# Patient Record
Sex: FEMALE | Race: OTHER | Employment: OTHER
[De-identification: names, ages, dates, MRNs, and addresses within clinical notes are randomized per-mention and may not be internally consistent; named-entity substitution may affect disease eponyms.]

---

## 2017-02-07 ENCOUNTER — OFFICE VISIT (OUTPATIENT)
Dept: FAMILY MEDICINE CLINIC | Facility: CLINIC | Age: 77
End: 2017-02-07

## 2017-02-07 VITALS
BODY MASS INDEX: 24.54 KG/M2 | HEART RATE: 82 BPM | TEMPERATURE: 97.3 F | HEIGHT: 60 IN | DIASTOLIC BLOOD PRESSURE: 81 MMHG | SYSTOLIC BLOOD PRESSURE: 137 MMHG | RESPIRATION RATE: 16 BRPM | OXYGEN SATURATION: 98 % | WEIGHT: 125 LBS

## 2017-02-07 DIAGNOSIS — E03.9 ACQUIRED HYPOTHYROIDISM: ICD-10-CM

## 2017-02-07 DIAGNOSIS — R55 SYNCOPE AND COLLAPSE: ICD-10-CM

## 2017-02-07 DIAGNOSIS — K55.9 COLITIS, ISCHEMIC (HCC): ICD-10-CM

## 2017-02-07 DIAGNOSIS — D62 ACUTE BLOOD LOSS ANEMIA: ICD-10-CM

## 2017-02-07 DIAGNOSIS — D69.6 THROMBOCYTOPENIA (HCC): ICD-10-CM

## 2017-02-07 DIAGNOSIS — Z09 HOSPITAL DISCHARGE FOLLOW-UP: Primary | ICD-10-CM

## 2017-02-07 PROCEDURE — 99214 OFFICE O/P EST MOD 30 MIN: CPT | Performed by: FAMILY MEDICINE

## 2017-02-09 ENCOUNTER — TELEPHONE (OUTPATIENT)
Dept: FAMILY MEDICINE CLINIC | Facility: CLINIC | Age: 77
End: 2017-02-09

## 2017-02-24 DIAGNOSIS — D62 ACUTE BLOOD LOSS ANEMIA: ICD-10-CM

## 2017-02-24 DIAGNOSIS — E03.9 ACQUIRED HYPOTHYROIDISM: ICD-10-CM

## 2017-02-24 LAB
FOLATE: NORMAL
IRON: NORMAL
RETIC: NORMAL
T4 FREE: NORMAL
TOTAL IRON BINDING CAPACITY: NORMAL
TSH SERPL DL<=0.05 MIU/L-ACNC: NORMAL UIU/ML
VITAMIN B-12: NORMAL

## 2017-03-24 DIAGNOSIS — F41.9 ANXIETY: ICD-10-CM

## 2017-03-24 RX ORDER — ALPRAZOLAM 0.5 MG/1
TABLET ORAL
Qty: 30 TABLET | Refills: 0 | Status: SHIPPED | OUTPATIENT
Start: 2017-03-24 | End: 2017-04-17 | Stop reason: SDUPTHER

## 2017-03-27 DIAGNOSIS — I10 ESSENTIAL HYPERTENSION: ICD-10-CM

## 2017-03-28 RX ORDER — LISINOPRIL 20 MG/1
TABLET ORAL
Qty: 30 TABLET | Refills: 1 | Status: SHIPPED | OUTPATIENT
Start: 2017-03-28 | End: 2017-04-17

## 2017-04-17 ENCOUNTER — OFFICE VISIT (OUTPATIENT)
Dept: FAMILY MEDICINE CLINIC | Age: 77
End: 2017-04-17
Payer: MEDICARE

## 2017-04-17 ENCOUNTER — HOSPITAL ENCOUNTER (OUTPATIENT)
Age: 77
Setting detail: SPECIMEN
Discharge: HOME | End: 2017-04-17
Payer: MEDICARE

## 2017-04-17 VITALS
WEIGHT: 123 LBS | BODY MASS INDEX: 24.15 KG/M2 | HEART RATE: 73 BPM | RESPIRATION RATE: 16 BRPM | HEIGHT: 60 IN | SYSTOLIC BLOOD PRESSURE: 123 MMHG | DIASTOLIC BLOOD PRESSURE: 90 MMHG

## 2017-04-17 DIAGNOSIS — I10 ESSENTIAL HYPERTENSION: Primary | ICD-10-CM

## 2017-04-17 DIAGNOSIS — R25.1 LIMB TREMOR: ICD-10-CM

## 2017-04-17 DIAGNOSIS — F41.9 ANXIETY: ICD-10-CM

## 2017-04-17 DIAGNOSIS — K21.9 GASTROESOPHAGEAL REFLUX DISEASE WITHOUT ESOPHAGITIS: ICD-10-CM

## 2017-04-17 LAB
ANION GAP SERPL CALCULATED.3IONS-SCNC: 16 MMOL/L (ref 9–17)
CHLORIDE BLD-SCNC: 102 MMOL/L (ref 98–107)
CO2: 24 MMOL/L (ref 20–31)
POTASSIUM SERPL-SCNC: 4.3 MMOL/L (ref 3.7–5.3)
SODIUM BLD-SCNC: 142 MMOL/L (ref 135–144)

## 2017-04-17 PROCEDURE — G8510 SCR DEP NEG, NO PLAN REQD: HCPCS | Performed by: FAMILY MEDICINE

## 2017-04-17 PROCEDURE — 3288F FALL RISK ASSESSMENT DOCD: CPT | Performed by: FAMILY MEDICINE

## 2017-04-17 PROCEDURE — 99214 OFFICE O/P EST MOD 30 MIN: CPT | Performed by: FAMILY MEDICINE

## 2017-04-17 RX ORDER — OMEPRAZOLE 20 MG/1
20 CAPSULE, DELAYED RELEASE ORAL DAILY
Qty: 30 CAPSULE | Refills: 11 | Status: SHIPPED | OUTPATIENT
Start: 2017-04-17 | End: 2018-04-18 | Stop reason: SDUPTHER

## 2017-04-17 RX ORDER — ALPRAZOLAM 0.5 MG/1
TABLET ORAL
Qty: 30 TABLET | Refills: 0 | Status: SHIPPED | OUTPATIENT
Start: 2017-04-17 | End: 2017-05-30 | Stop reason: SDUPTHER

## 2017-04-17 RX ORDER — LISINOPRIL 20 MG/1
TABLET ORAL
Qty: 30 TABLET | Refills: 5 | Status: SHIPPED | OUTPATIENT
Start: 2017-04-17 | End: 2017-10-31 | Stop reason: SDUPTHER

## 2017-04-17 RX ORDER — RISPERIDONE 3 MG/1
3 TABLET, FILM COATED ORAL 2 TIMES DAILY
Qty: 60 TABLET | Refills: 11 | Status: SHIPPED | OUTPATIENT
Start: 2017-04-17 | End: 2018-07-30 | Stop reason: SDUPTHER

## 2017-04-17 ASSESSMENT — PATIENT HEALTH QUESTIONNAIRE - PHQ9
1. LITTLE INTEREST OR PLEASURE IN DOING THINGS: 0
SUM OF ALL RESPONSES TO PHQ9 QUESTIONS 1 & 2: 0
2. FEELING DOWN, DEPRESSED OR HOPELESS: 0
SUM OF ALL RESPONSES TO PHQ QUESTIONS 1-9: 0

## 2017-04-18 DIAGNOSIS — Z13.9 SCREENING: Primary | ICD-10-CM

## 2017-04-26 ENCOUNTER — TELEPHONE (OUTPATIENT)
Dept: FAMILY MEDICINE CLINIC | Age: 77
End: 2017-04-26

## 2017-05-02 ENCOUNTER — HOSPITAL ENCOUNTER (OUTPATIENT)
Dept: MRI IMAGING | Age: 77
Discharge: HOME | End: 2017-05-02
Payer: MEDICARE

## 2017-05-02 DIAGNOSIS — R25.1 LIMB TREMOR: ICD-10-CM

## 2017-05-02 LAB
BUN BLDV-MCNC: 13 MG/DL (ref 8–23)
CREAT SERPL-MCNC: 1 MG/DL (ref 0.5–0.9)
GFR AFRICAN AMERICAN: >60 ML/MIN
GFR NON-AFRICAN AMERICAN: 54 ML/MIN
GFR SERPL CREATININE-BSD FRML MDRD: ABNORMAL ML/MIN/{1.73_M2}
GFR SERPL CREATININE-BSD FRML MDRD: ABNORMAL ML/MIN/{1.73_M2}

## 2017-05-02 PROCEDURE — A9579 GAD-BASE MR CONTRAST NOS,1ML: HCPCS | Performed by: FAMILY MEDICINE

## 2017-05-02 PROCEDURE — 6360000004 HC RX CONTRAST MEDICATION: Performed by: FAMILY MEDICINE

## 2017-05-02 PROCEDURE — 82565 ASSAY OF CREATININE: CPT

## 2017-05-02 PROCEDURE — 84520 ASSAY OF UREA NITROGEN: CPT

## 2017-05-02 PROCEDURE — 36415 COLL VENOUS BLD VENIPUNCTURE: CPT

## 2017-05-02 PROCEDURE — 70553 MRI BRAIN STEM W/O & W/DYE: CPT

## 2017-05-02 RX ADMIN — GADOPENTETATE DIMEGLUMINE 12 ML: 469.01 INJECTION INTRAVENOUS at 16:46

## 2017-05-03 ENCOUNTER — TELEPHONE (OUTPATIENT)
Dept: FAMILY MEDICINE CLINIC | Age: 77
End: 2017-05-03

## 2017-05-26 ENCOUNTER — TELEPHONE (OUTPATIENT)
Dept: FAMILY MEDICINE CLINIC | Age: 77
End: 2017-05-26

## 2017-05-26 DIAGNOSIS — R25.1 LIMB TREMOR: Primary | ICD-10-CM

## 2017-05-30 DIAGNOSIS — F41.9 ANXIETY: ICD-10-CM

## 2017-05-31 RX ORDER — ALPRAZOLAM 0.5 MG/1
TABLET ORAL
Qty: 30 TABLET | Refills: 0 | Status: SHIPPED | OUTPATIENT
Start: 2017-05-31 | End: 2017-07-02 | Stop reason: SDUPTHER

## 2017-07-02 DIAGNOSIS — F41.9 ANXIETY: ICD-10-CM

## 2017-07-03 RX ORDER — ALPRAZOLAM 0.5 MG/1
TABLET ORAL
Qty: 30 TABLET | Refills: 0 | Status: SHIPPED | OUTPATIENT
Start: 2017-07-03 | End: 2017-07-31 | Stop reason: SDUPTHER

## 2017-07-24 ENCOUNTER — OFFICE VISIT (OUTPATIENT)
Dept: FAMILY MEDICINE CLINIC | Age: 77
End: 2017-07-24
Payer: MEDICARE

## 2017-07-24 VITALS
HEART RATE: 69 BPM | WEIGHT: 114 LBS | DIASTOLIC BLOOD PRESSURE: 68 MMHG | HEIGHT: 59 IN | BODY MASS INDEX: 22.98 KG/M2 | SYSTOLIC BLOOD PRESSURE: 123 MMHG

## 2017-07-24 DIAGNOSIS — R19.03 RIGHT LOWER QUADRANT ABDOMINAL MASS: Primary | ICD-10-CM

## 2017-07-24 DIAGNOSIS — N13.9 URINARY OBSTRUCTION: ICD-10-CM

## 2017-07-24 DIAGNOSIS — R94.6 ABNORMAL THYROID EXAM: ICD-10-CM

## 2017-07-24 PROCEDURE — 99214 OFFICE O/P EST MOD 30 MIN: CPT | Performed by: FAMILY MEDICINE

## 2017-07-25 DIAGNOSIS — R94.6 ABNORMAL THYROID EXAM: ICD-10-CM

## 2017-07-25 LAB — T4 FREE: NORMAL

## 2017-07-26 ENCOUNTER — HOSPITAL ENCOUNTER (OUTPATIENT)
Dept: CT IMAGING | Age: 77
Discharge: HOME | End: 2017-07-26
Payer: MEDICARE

## 2017-07-26 DIAGNOSIS — R19.03 RIGHT LOWER QUADRANT ABDOMINAL MASS: ICD-10-CM

## 2017-07-26 LAB
BUN BLDV-MCNC: 11 MG/DL (ref 8–23)
CREAT SERPL-MCNC: 0.63 MG/DL (ref 0.5–0.9)
GFR AFRICAN AMERICAN: >60 ML/MIN
GFR NON-AFRICAN AMERICAN: >60 ML/MIN
GFR SERPL CREATININE-BSD FRML MDRD: NORMAL ML/MIN/{1.73_M2}
GFR SERPL CREATININE-BSD FRML MDRD: NORMAL ML/MIN/{1.73_M2}

## 2017-07-26 PROCEDURE — 82565 ASSAY OF CREATININE: CPT

## 2017-07-26 PROCEDURE — 74177 CT ABD & PELVIS W/CONTRAST: CPT

## 2017-07-26 PROCEDURE — 84520 ASSAY OF UREA NITROGEN: CPT

## 2017-07-26 PROCEDURE — 36415 COLL VENOUS BLD VENIPUNCTURE: CPT

## 2017-07-26 PROCEDURE — 6360000004 HC RX CONTRAST MEDICATION: Performed by: FAMILY MEDICINE

## 2017-07-26 RX ADMIN — IOVERSOL: 741 INJECTION INTRA-ARTERIAL; INTRAVENOUS at 13:54

## 2017-07-31 DIAGNOSIS — F41.9 ANXIETY: ICD-10-CM

## 2017-07-31 RX ORDER — ALPRAZOLAM 0.5 MG/1
TABLET ORAL
Qty: 30 TABLET | Refills: 0 | Status: SHIPPED | OUTPATIENT
Start: 2017-07-31 | End: 2017-08-29 | Stop reason: SDUPTHER

## 2017-08-29 DIAGNOSIS — F41.9 ANXIETY: ICD-10-CM

## 2017-08-29 RX ORDER — ALPRAZOLAM 0.5 MG/1
TABLET ORAL
Qty: 30 TABLET | Refills: 0 | Status: SHIPPED | OUTPATIENT
Start: 2017-08-29 | End: 2017-12-27 | Stop reason: SDUPTHER

## 2017-10-12 ENCOUNTER — HOSPITAL ENCOUNTER (OUTPATIENT)
Age: 77
Setting detail: SPECIMEN
Discharge: HOME | End: 2017-10-12
Payer: MEDICARE

## 2017-10-12 ENCOUNTER — OFFICE VISIT (OUTPATIENT)
Dept: FAMILY MEDICINE CLINIC | Age: 77
End: 2017-10-12
Payer: MEDICARE

## 2017-10-12 VITALS
BODY MASS INDEX: 22.58 KG/M2 | HEART RATE: 73 BPM | SYSTOLIC BLOOD PRESSURE: 140 MMHG | DIASTOLIC BLOOD PRESSURE: 80 MMHG | HEIGHT: 59 IN | WEIGHT: 112 LBS | RESPIRATION RATE: 16 BRPM | OXYGEN SATURATION: 97 %

## 2017-10-12 DIAGNOSIS — R31.9 HEMATURIA: Primary | ICD-10-CM

## 2017-10-12 DIAGNOSIS — N32.81 OAB (OVERACTIVE BLADDER): ICD-10-CM

## 2017-10-12 DIAGNOSIS — F41.9 ANXIETY: ICD-10-CM

## 2017-10-12 DIAGNOSIS — N30.01 ACUTE CYSTITIS WITH HEMATURIA: ICD-10-CM

## 2017-10-12 LAB
BILIRUBIN, POC: ABNORMAL
BLOOD URINE, POC: ABNORMAL
CLARITY, POC: CLEAR
COLOR, POC: YELLOW
GLUCOSE URINE, POC: ABNORMAL
KETONES, POC: ABNORMAL
LEUKOCYTE EST, POC: ABNORMAL
NITRITE, POC: ABNORMAL
PH, POC: 6
PROTEIN, POC: ABNORMAL
SPECIFIC GRAVITY, POC: 1.02
UROBILINOGEN, POC: ABNORMAL

## 2017-10-12 PROCEDURE — 99214 OFFICE O/P EST MOD 30 MIN: CPT | Performed by: FAMILY MEDICINE

## 2017-10-12 PROCEDURE — 81002 URINALYSIS NONAUTO W/O SCOPE: CPT | Performed by: FAMILY MEDICINE

## 2017-10-12 RX ORDER — ALPRAZOLAM 1 MG/1
1 TABLET ORAL NIGHTLY PRN
Qty: 30 TABLET | Refills: 2 | Status: SHIPPED | OUTPATIENT
Start: 2017-10-12 | End: 2017-12-28 | Stop reason: SDUPTHER

## 2017-10-12 RX ORDER — LEVOFLOXACIN 500 MG/1
500 TABLET, FILM COATED ORAL DAILY
Qty: 10 TABLET | Refills: 0 | Status: SHIPPED | OUTPATIENT
Start: 2017-10-12 | End: 2017-10-22

## 2017-10-12 RX ORDER — OXYBUTYNIN CHLORIDE 10 MG/1
10 TABLET, EXTENDED RELEASE ORAL DAILY
Qty: 30 TABLET | Refills: 11 | Status: SHIPPED | OUTPATIENT
Start: 2017-10-12 | End: 2018-07-30 | Stop reason: SINTOL

## 2017-10-12 NOTE — PROGRESS NOTES
HYPERTENSION visit     BP Readings from Last 3 Encounters:   07/24/17 123/68   04/17/17 (!) 123/90   02/07/17 137/81       LDL Calculated (mg/dL)   Date Value   11/01/2013 50     HDL (mg/dL)   Date Value   11/01/2013 74 (A)     BUN (mg/dL)   Date Value   07/26/2017 11     CREATININE (mg/dL)   Date Value   07/26/2017 0.63              Have you changed or started any medications since your last visit including any over-the-counter medicines, vitamins, or herbal medicines? no   Have you stopped taking any of your medications? Is so, why? -  no  Are you having any side effects from any of your medications? - no    Have you seen any other physician or provider since your last visit?  no   Have you had any other diagnostic tests since your last visit?  no   Have you been seen in the emergency room and/or had an admission in a hospital since we last saw you?  no   Have you had your routine dental cleaning in the past 6 months?  no     Do you have an active MyChart account? If no, what is the barrier?   No:     Patient Care Team:  Anders Wang DO as PCP - General (Family Medicine)    Medical History Review  Past Medical, Family, and Social History reviewed and does contribute to the patient presenting condition    Health Maintenance   Topic Date Due    DTaP/Tdap/Td vaccine (1 - Tdap) 06/14/1959    Flu vaccine (1) 09/01/2017    Lipid screen  11/01/2018    Zostavax vaccine  Completed    DEXA (modify frequency per FRAX score)  Completed    Pneumococcal low/med risk  Completed

## 2017-10-12 NOTE — PROGRESS NOTES
[facility] PHYSICIANS  COMPREHENSIVE CARE  [address]  [address] [facility] [address]  Dept: [phone]      Kay Watt is a 69 y.o. female who presents today for follow up on her  medical conditions as noted below. Chief Complaint   Patient presents with    Hypertension     6 month check       Patient Active Problem List:     Anxiety     Insomnia     GERD (gastroesophageal reflux disease)     HTN (hypertension)     Acute blood loss anemia     Colitis, ischemic (HCC)     Syncope and collapse     Decreased platelet count (HCC)     Past Medical History:   Diagnosis Date    Anxiety     GERD (gastroesophageal reflux disease)     HTN (hypertension) 12/4/2014    Hyperthyroidism     have been running really high    Insomnia       Past Surgical History:   Procedure Laterality Date    APPENDECTOMY      HYSTERECTOMY       Family History   Problem Relation Age of Onset    Heart Disease Father        Current Outpatient Prescriptions   Medication Sig Dispense Refill    ALPRAZolam (XANAX) 1 MG tablet Take 1 tablet by mouth nightly as needed for Sleep 30 tablet 2    levofloxacin (LEVAQUIN) 500 MG tablet Take 1 tablet by mouth daily for 10 days 10 tablet 0    oxybutynin (DITROPAN-XL) 10 MG extended release tablet Take 1 tablet by mouth daily 30 tablet 11    ALPRAZolam (XANAX) 0.5 MG tablet TAKE ONE TABLET BY MOUTH EVERY NIGHT AT BEDTIME AS NEEDED FOR SLEEP 30 tablet 0    carbidopa-levodopa (SINEMET)  MG per tablet       lisinopril (PRINIVIL;ZESTRIL) 20 MG tablet TAKE ONE TABLET BY MOUTH DAILY 30 tablet 5    omeprazole (PRILOSEC) 20 MG delayed release capsule Take 1 capsule by mouth Daily 30 capsule 11    risperiDONE (RISPERDAL) 3 MG tablet Take 1 tablet by mouth 2 times daily (Patient taking differently: Take 1 mg by mouth 2 times daily ) 60 tablet 11     No current facility-administered medications for this visit.       ALLERGIES:  No Known Allergies    Social History   Substance Use Topics m)   Wt 112 lb (50.8 kg)   SpO2 97%   Breastfeeding? No   BMI 22.61 kg/m²   Constitutional: She is oriented to person, place, and time. She   appears well-developed and well-nourished. HENT:   Head: Normocephalic and atraumatic. Right Ear: External ear normal. Tympanic membrane is not erythematous. No middle ear effusion. Left Ear: External ear normal. Tympanic membrane is not erythematous. No middle ear effusion. Nose: No mucosal edema. Mouth/Throat: Oropharynx is clear and moist. No posterior oropharyngeal erythema. Eyes: Conjunctivae and EOM are normal. Pupils are equal, round, and reactive to light. Neck: Normal range of motion. Neck supple. No thyromegaly present. Cardiovascular: Normal rate, regular rhythm and normal heart sounds. No murmur heard. Pulmonary/Chest: Effort normal and breath sounds normal. She has no wheezes. Shehas no rales. Abdominal: Soft. Bowel sounds are normal. She exhibits no distension and no mass. There is no tenderness. There is no rebound and no guarding. Genitourinary/Anorectal:deferred  Musculoskeletal: Normal range of motion. She exhibits no edema or tenderness. Lymphadenopathy: She has no cervical adenopathy. Neurological: She is alert and oriented to person, place, and time. She has normal reflexes. Skin: Skin is warm and dry. No rash noted. Psychiatric: She has a normal mood and affect. Her   behavior is normal.       Assessment:      1. Hematuria    2. Anxiety    3. Acute cystitis with hematuria    4. OAB (overactive bladder)          Plan:      Call or return to clinic prn if these symptoms worsen or fail to improve as anticipated. I have reviewed the instructions with the patient, answering all questions to her satisfaction. No Follow-up on file. Orders Placed This Encounter   Procedures    Urine Culture     Order Specific Question:   Specify (ex-cath, midstream, cysto, etc)?      Answer:   midstream    POCT Urinalysis no Micro Orders Placed This Encounter   Medications    ALPRAZolam (XANAX) 1 MG tablet     Sig: Take 1 tablet by mouth nightly as needed for Sleep     Dispense:  30 tablet     Refill:  2    levofloxacin (LEVAQUIN) 500 MG tablet     Sig: Take 1 tablet by mouth daily for 10 days     Dispense:  10 tablet     Refill:  0    oxybutynin (DITROPAN-XL) 10 MG extended release tablet     Sig: Take 1 tablet by mouth daily     Dispense:  30 tablet     Refill:  11     She is to try the above medication if this does not help her she is to notify and otherwise return to the office in 6 months  Electronically signed by Kaia Will DO on 10/12/2017 at 2:08 PM

## 2017-10-13 LAB
CULTURE: NORMAL
CULTURE: NORMAL
Lab: NORMAL
SPECIMEN DESCRIPTION: NORMAL
STATUS: NORMAL

## 2017-10-18 DIAGNOSIS — R31.9 HEMATURIA, UNSPECIFIED TYPE: Primary | ICD-10-CM

## 2017-12-27 DIAGNOSIS — F41.9 ANXIETY: ICD-10-CM

## 2017-12-27 RX ORDER — ALPRAZOLAM 0.5 MG/1
TABLET ORAL
Qty: 30 TABLET | Refills: 0 | Status: SHIPPED | OUTPATIENT
Start: 2017-12-27 | End: 2018-07-30

## 2017-12-28 DIAGNOSIS — F41.9 ANXIETY: ICD-10-CM

## 2017-12-30 RX ORDER — ALPRAZOLAM 1 MG/1
TABLET ORAL
Qty: 30 TABLET | Refills: 0 | Status: SHIPPED | OUTPATIENT
Start: 2017-12-30 | End: 2018-01-30 | Stop reason: SDUPTHER

## 2018-01-30 DIAGNOSIS — F41.9 ANXIETY: ICD-10-CM

## 2018-01-30 RX ORDER — ALPRAZOLAM 1 MG/1
TABLET ORAL
Qty: 30 TABLET | Refills: 0 | Status: SHIPPED | OUTPATIENT
Start: 2018-01-30 | End: 2018-03-01

## 2018-04-18 DIAGNOSIS — K21.9 GASTROESOPHAGEAL REFLUX DISEASE WITHOUT ESOPHAGITIS: ICD-10-CM

## 2018-04-18 RX ORDER — OMEPRAZOLE 20 MG/1
CAPSULE, DELAYED RELEASE ORAL
Qty: 90 CAPSULE | Refills: 10 | Status: SHIPPED | OUTPATIENT
Start: 2018-04-18 | End: 2019-07-14 | Stop reason: SDUPTHER

## 2018-04-27 DIAGNOSIS — I10 ESSENTIAL HYPERTENSION: ICD-10-CM

## 2018-04-30 RX ORDER — LISINOPRIL 20 MG/1
TABLET ORAL
Qty: 30 TABLET | Refills: 4 | Status: SHIPPED | OUTPATIENT
Start: 2018-04-30 | End: 2018-09-25 | Stop reason: SDUPTHER

## 2018-05-29 ENCOUNTER — HOSPITAL ENCOUNTER (OUTPATIENT)
Age: 78
Setting detail: SPECIMEN
Discharge: HOME | End: 2018-05-29
Payer: MEDICARE

## 2018-05-29 ENCOUNTER — OFFICE VISIT (OUTPATIENT)
Dept: FAMILY MEDICINE CLINIC | Age: 78
End: 2018-05-29
Payer: MEDICARE

## 2018-05-29 VITALS
HEIGHT: 59 IN | HEART RATE: 77 BPM | DIASTOLIC BLOOD PRESSURE: 80 MMHG | WEIGHT: 110 LBS | SYSTOLIC BLOOD PRESSURE: 138 MMHG | BODY MASS INDEX: 22.18 KG/M2 | RESPIRATION RATE: 16 BRPM

## 2018-05-29 DIAGNOSIS — R63.0 DECREASED APPETITE: ICD-10-CM

## 2018-05-29 DIAGNOSIS — E55.9 VITAMIN D DEFICIENCY: ICD-10-CM

## 2018-05-29 DIAGNOSIS — R34 DECREASED URINE OUTPUT: ICD-10-CM

## 2018-05-29 DIAGNOSIS — R43.8 BAD TASTE IN MOUTH: ICD-10-CM

## 2018-05-29 DIAGNOSIS — D69.6 DECREASED PLATELET COUNT (HCC): ICD-10-CM

## 2018-05-29 DIAGNOSIS — D50.8 IRON DEFICIENCY ANEMIA SECONDARY TO INADEQUATE DIETARY IRON INTAKE: ICD-10-CM

## 2018-05-29 DIAGNOSIS — R73.09 ELEVATED GLUCOSE: ICD-10-CM

## 2018-05-29 DIAGNOSIS — S33.5XXA LUMBAR SPRAIN, INITIAL ENCOUNTER: Primary | ICD-10-CM

## 2018-05-29 DIAGNOSIS — K55.9 COLITIS, ISCHEMIC (HCC): ICD-10-CM

## 2018-05-29 LAB
ABSOLUTE EOS #: 0.12 K/UL (ref 0–0.44)
ABSOLUTE IMMATURE GRANULOCYTE: <0.03 K/UL (ref 0–0.3)
ABSOLUTE LYMPH #: 0.96 K/UL (ref 1.1–3.7)
ABSOLUTE MONO #: 0.33 K/UL (ref 0.1–1.2)
ALBUMIN SERPL-MCNC: 3.8 G/DL (ref 3.5–5.2)
ALBUMIN/GLOBULIN RATIO: 1.2 (ref 1–2.5)
ALP BLD-CCNC: 99 U/L (ref 35–104)
ALT SERPL-CCNC: 21 U/L (ref 5–33)
ANION GAP SERPL CALCULATED.3IONS-SCNC: 14 MMOL/L (ref 9–17)
AST SERPL-CCNC: 28 U/L
BASOPHILS # BLD: 0 % (ref 0–2)
BASOPHILS ABSOLUTE: <0.03 K/UL (ref 0–0.2)
BILIRUB SERPL-MCNC: 0.48 MG/DL (ref 0.3–1.2)
BILIRUBIN, POC: NORMAL
BLOOD URINE, POC: NEGATIVE
BUN BLDV-MCNC: 7 MG/DL (ref 8–23)
BUN/CREAT BLD: ABNORMAL (ref 9–20)
CALCIUM SERPL-MCNC: 8.6 MG/DL (ref 8.6–10.4)
CHLORIDE BLD-SCNC: 101 MMOL/L (ref 98–107)
CLARITY, POC: CLEAR
CO2: 25 MMOL/L (ref 20–31)
COLOR, POC: YELLOW
CREAT SERPL-MCNC: 0.51 MG/DL (ref 0.5–0.9)
DIFFERENTIAL TYPE: ABNORMAL
EOSINOPHILS RELATIVE PERCENT: 3 % (ref 1–4)
GFR AFRICAN AMERICAN: >60 ML/MIN
GFR NON-AFRICAN AMERICAN: >60 ML/MIN
GFR SERPL CREATININE-BSD FRML MDRD: ABNORMAL ML/MIN/{1.73_M2}
GFR SERPL CREATININE-BSD FRML MDRD: ABNORMAL ML/MIN/{1.73_M2}
GLUCOSE BLD-MCNC: 80 MG/DL (ref 70–99)
GLUCOSE URINE, POC: NORMAL
HBA1C MFR BLD: 5.3 %
HCT VFR BLD CALC: 34.8 % (ref 36.3–47.1)
HEMOGLOBIN: 11.2 G/DL (ref 11.9–15.1)
IMMATURE GRANULOCYTES: 0 %
KETONES, POC: NORMAL
LEUKOCYTE EST, POC: NORMAL
LYMPHOCYTES # BLD: 26 % (ref 24–43)
MCH RBC QN AUTO: 32.8 PG (ref 25.2–33.5)
MCHC RBC AUTO-ENTMCNC: 32.2 G/DL (ref 28.4–34.8)
MCV RBC AUTO: 102.1 FL (ref 82.6–102.9)
MONOCYTES # BLD: 9 % (ref 3–12)
NITRITE, POC: NORMAL
NRBC AUTOMATED: 0 PER 100 WBC
PDW BLD-RTO: 12.2 % (ref 11.8–14.4)
PH, POC: 6
PLATELET # BLD: 163 K/UL (ref 138–453)
PLATELET ESTIMATE: ABNORMAL
PMV BLD AUTO: 9.8 FL (ref 8.1–13.5)
POTASSIUM SERPL-SCNC: 3.7 MMOL/L (ref 3.7–5.3)
PROTEIN, POC: NORMAL
RBC # BLD: 3.41 M/UL (ref 3.95–5.11)
RBC # BLD: ABNORMAL 10*6/UL
SEG NEUTROPHILS: 62 % (ref 36–65)
SEGMENTED NEUTROPHILS ABSOLUTE COUNT: 2.34 K/UL (ref 1.5–8.1)
SODIUM BLD-SCNC: 140 MMOL/L (ref 135–144)
SPECIFIC GRAVITY, POC: 1.02
TOTAL PROTEIN: 7.1 G/DL (ref 6.4–8.3)
UROBILINOGEN, POC: NORMAL
VITAMIN D 25-HYDROXY: 151.8 NG/ML (ref 30–100)
WBC # BLD: 3.8 K/UL (ref 3.5–11.3)
WBC # BLD: ABNORMAL 10*3/UL

## 2018-05-29 PROCEDURE — 99215 OFFICE O/P EST HI 40 MIN: CPT | Performed by: FAMILY MEDICINE

## 2018-05-29 PROCEDURE — 81002 URINALYSIS NONAUTO W/O SCOPE: CPT | Performed by: FAMILY MEDICINE

## 2018-05-29 PROCEDURE — 1036F TOBACCO NON-USER: CPT | Performed by: FAMILY MEDICINE

## 2018-05-29 PROCEDURE — 1123F ACP DISCUSS/DSCN MKR DOCD: CPT | Performed by: FAMILY MEDICINE

## 2018-05-29 PROCEDURE — G8427 DOCREV CUR MEDS BY ELIG CLIN: HCPCS | Performed by: FAMILY MEDICINE

## 2018-05-29 PROCEDURE — G8420 CALC BMI NORM PARAMETERS: HCPCS | Performed by: FAMILY MEDICINE

## 2018-05-29 PROCEDURE — 4040F PNEUMOC VAC/ADMIN/RCVD: CPT | Performed by: FAMILY MEDICINE

## 2018-05-29 PROCEDURE — 1090F PRES/ABSN URINE INCON ASSESS: CPT | Performed by: FAMILY MEDICINE

## 2018-05-29 PROCEDURE — G8399 PT W/DXA RESULTS DOCUMENT: HCPCS | Performed by: FAMILY MEDICINE

## 2018-05-29 PROCEDURE — 83036 HEMOGLOBIN GLYCOSYLATED A1C: CPT | Performed by: FAMILY MEDICINE

## 2018-05-29 RX ORDER — CLONAZEPAM 0.5 MG/1
TABLET ORAL
COMMUNITY
Start: 2018-05-25 | End: 2018-07-30 | Stop reason: SDUPTHER

## 2018-05-29 RX ORDER — NAPROXEN 375 MG/1
375 TABLET ORAL 2 TIMES DAILY WITH MEALS
Qty: 60 TABLET | Refills: 3 | Status: SHIPPED | OUTPATIENT
Start: 2018-05-29 | End: 2018-07-30 | Stop reason: SINTOL

## 2018-05-29 RX ORDER — ROPINIROLE 0.25 MG/1
TABLET, FILM COATED ORAL
COMMUNITY
Start: 2018-05-08 | End: 2018-07-30 | Stop reason: SINTOL

## 2018-05-29 RX ORDER — VENLAFAXINE HYDROCHLORIDE 75 MG/1
CAPSULE, EXTENDED RELEASE ORAL
COMMUNITY
Start: 2018-05-10 | End: 2018-07-30 | Stop reason: SINTOL

## 2018-05-29 RX ORDER — TIZANIDINE HYDROCHLORIDE 2 MG/1
CAPSULE, GELATIN COATED ORAL
Qty: 30 CAPSULE | Refills: 0 | Status: SHIPPED | OUTPATIENT
Start: 2018-05-29 | End: 2018-08-15 | Stop reason: SDUPTHER

## 2018-05-29 ASSESSMENT — PATIENT HEALTH QUESTIONNAIRE - PHQ9
2. FEELING DOWN, DEPRESSED OR HOPELESS: 0
SUM OF ALL RESPONSES TO PHQ9 QUESTIONS 1 & 2: 0
1. LITTLE INTEREST OR PLEASURE IN DOING THINGS: 0
SUM OF ALL RESPONSES TO PHQ QUESTIONS 1-9: 0

## 2018-06-05 ENCOUNTER — TELEPHONE (OUTPATIENT)
Dept: FAMILY MEDICINE CLINIC | Age: 78
End: 2018-06-05

## 2018-06-06 DIAGNOSIS — S33.5XXA LUMBAR SPRAIN, INITIAL ENCOUNTER: Primary | ICD-10-CM

## 2018-06-06 DIAGNOSIS — D50.8 IRON DEFICIENCY ANEMIA SECONDARY TO INADEQUATE DIETARY IRON INTAKE: Primary | ICD-10-CM

## 2018-06-28 DIAGNOSIS — D50.8 IRON DEFICIENCY ANEMIA SECONDARY TO INADEQUATE DIETARY IRON INTAKE: ICD-10-CM

## 2018-07-02 ENCOUNTER — OFFICE VISIT (OUTPATIENT)
Dept: FAMILY MEDICINE CLINIC | Age: 78
End: 2018-07-02
Payer: MEDICARE

## 2018-07-02 VITALS
SYSTOLIC BLOOD PRESSURE: 167 MMHG | OXYGEN SATURATION: 98 % | WEIGHT: 103.8 LBS | BODY MASS INDEX: 24.02 KG/M2 | DIASTOLIC BLOOD PRESSURE: 90 MMHG | HEART RATE: 86 BPM | HEIGHT: 55 IN | RESPIRATION RATE: 16 BRPM

## 2018-07-02 DIAGNOSIS — R11.2 NON-INTRACTABLE VOMITING WITH NAUSEA, UNSPECIFIED VOMITING TYPE: Primary | ICD-10-CM

## 2018-07-02 PROCEDURE — 4040F PNEUMOC VAC/ADMIN/RCVD: CPT | Performed by: FAMILY MEDICINE

## 2018-07-02 PROCEDURE — G8427 DOCREV CUR MEDS BY ELIG CLIN: HCPCS | Performed by: FAMILY MEDICINE

## 2018-07-02 PROCEDURE — 1036F TOBACCO NON-USER: CPT | Performed by: FAMILY MEDICINE

## 2018-07-02 PROCEDURE — 1123F ACP DISCUSS/DSCN MKR DOCD: CPT | Performed by: FAMILY MEDICINE

## 2018-07-02 PROCEDURE — 1090F PRES/ABSN URINE INCON ASSESS: CPT | Performed by: FAMILY MEDICINE

## 2018-07-02 PROCEDURE — 99214 OFFICE O/P EST MOD 30 MIN: CPT | Performed by: FAMILY MEDICINE

## 2018-07-02 PROCEDURE — G8399 PT W/DXA RESULTS DOCUMENT: HCPCS | Performed by: FAMILY MEDICINE

## 2018-07-02 PROCEDURE — G8417 CALC BMI ABV UP PARAM F/U: HCPCS | Performed by: FAMILY MEDICINE

## 2018-07-02 RX ORDER — ONDANSETRON 4 MG/1
4 TABLET, FILM COATED ORAL EVERY 8 HOURS PRN
Qty: 60 TABLET | Refills: 0 | Status: SHIPPED | OUTPATIENT
Start: 2018-07-02 | End: 2019-05-21

## 2018-07-02 ASSESSMENT — ENCOUNTER SYMPTOMS
EYE PAIN: 0
BLOOD IN STOOL: 0
CONSTIPATION: 0
ABDOMINAL PAIN: 0
ANAL BLEEDING: 0
ABDOMINAL DISTENTION: 0
SHORTNESS OF BREATH: 0
NAUSEA: 1
RECTAL PAIN: 0
DIARRHEA: 0
VOMITING: 0

## 2018-07-02 NOTE — PROGRESS NOTES
Negative for abdominal distention, abdominal pain, anal bleeding, blood in stool, constipation, diarrhea, rectal pain and vomiting. Musculoskeletal: Negative for gait problem. Skin: Negative for pallor. Neurological: Positive for headaches. Negative for seizures. Psychiatric/Behavioral: Negative for dysphoric mood and suicidal ideas. Objective:     BP (!) 167/90   Pulse 86   Resp 16   Ht 4' (1.219 m)   Wt 103 lb 12.8 oz (47.1 kg)   SpO2 98%   BMI 31.68 kg/m²     Physical Exam   Constitutional: She is oriented to person, place, and time. She appears well-developed. HENT:   Head: Normocephalic. Eyes: Conjunctivae and EOM are normal.   Cardiovascular: Normal heart sounds. No murmur heard. Pulmonary/Chest: Effort normal and breath sounds normal. No respiratory distress. She has no wheezes. Abdominal: Soft. She exhibits no distension and no mass. There is no tenderness. There is no rebound and no guarding. Neurological: She is alert and oriented to person, place, and time. Skin: She is not diaphoretic. Psychiatric: She has a normal mood and affect. Her behavior is normal. Judgment and thought content normal.       Assessment & Plan:      1. Vomiting, headaches  The etiology of the N/V is unclear, could be related to viral infection. Recommended Zofran for nausea, reassuring that vomiting has now resolved and that she does not have abdominal pain. Discussed brat diet, needs to start oral rehydration solution with electrolytes which could help her headaches, educated on red flags. She'll follow-up should her nausea and headaches persists into next week or worsen. Continue follow-up with psychiatry for her bipolar medication. - ondansetron (ZOFRAN) 4 MG tablet; Take 1 tablet by mouth every 8 hours as needed for Nausea or Vomiting  Dispense: 60 tablet;  Refill: 0    Blood pressure elevated on today's visit, recommended following up with PCP for reassessment    Call or return to clinic

## 2018-07-06 ENCOUNTER — OFFICE VISIT (OUTPATIENT)
Dept: FAMILY MEDICINE CLINIC | Age: 78
End: 2018-07-06
Payer: MEDICARE

## 2018-07-06 VITALS
HEART RATE: 90 BPM | RESPIRATION RATE: 16 BRPM | WEIGHT: 102.8 LBS | DIASTOLIC BLOOD PRESSURE: 93 MMHG | OXYGEN SATURATION: 98 % | BODY MASS INDEX: 31.37 KG/M2 | SYSTOLIC BLOOD PRESSURE: 177 MMHG

## 2018-07-06 DIAGNOSIS — R11.2 INTRACTABLE VOMITING WITH NAUSEA, UNSPECIFIED VOMITING TYPE: Primary | ICD-10-CM

## 2018-07-06 PROCEDURE — 1123F ACP DISCUSS/DSCN MKR DOCD: CPT | Performed by: FAMILY MEDICINE

## 2018-07-06 PROCEDURE — 4040F PNEUMOC VAC/ADMIN/RCVD: CPT | Performed by: FAMILY MEDICINE

## 2018-07-06 PROCEDURE — 1090F PRES/ABSN URINE INCON ASSESS: CPT | Performed by: FAMILY MEDICINE

## 2018-07-06 PROCEDURE — G8399 PT W/DXA RESULTS DOCUMENT: HCPCS | Performed by: FAMILY MEDICINE

## 2018-07-06 PROCEDURE — 99213 OFFICE O/P EST LOW 20 MIN: CPT | Performed by: FAMILY MEDICINE

## 2018-07-06 PROCEDURE — 1036F TOBACCO NON-USER: CPT | Performed by: FAMILY MEDICINE

## 2018-07-06 PROCEDURE — G8427 DOCREV CUR MEDS BY ELIG CLIN: HCPCS | Performed by: FAMILY MEDICINE

## 2018-07-06 PROCEDURE — 96372 THER/PROPH/DIAG INJ SC/IM: CPT | Performed by: FAMILY MEDICINE

## 2018-07-06 PROCEDURE — G8417 CALC BMI ABV UP PARAM F/U: HCPCS | Performed by: FAMILY MEDICINE

## 2018-07-06 RX ORDER — ONDANSETRON 4 MG/1
4 TABLET, ORALLY DISINTEGRATING ORAL EVERY 8 HOURS PRN
Qty: 20 TABLET | Refills: 1 | Status: SHIPPED | OUTPATIENT
Start: 2018-07-06 | End: 2018-12-21 | Stop reason: SDUPTHER

## 2018-07-06 RX ORDER — PROMETHAZINE HYDROCHLORIDE 50 MG/ML
25 INJECTION, SOLUTION INTRAMUSCULAR; INTRAVENOUS ONCE
Status: COMPLETED | OUTPATIENT
Start: 2018-07-06 | End: 2018-07-06

## 2018-07-06 RX ADMIN — PROMETHAZINE HYDROCHLORIDE 25 MG: 50 INJECTION, SOLUTION INTRAMUSCULAR; INTRAVENOUS at 13:49

## 2018-07-06 NOTE — PROGRESS NOTES
Subjective:       Kiley Heller is a 69 y.o. female who presents for evaluation of nausea and vomiting. The patient was seen in the office on 7/2/2018. She tells me that the medication which was prescribed hasn't helped her nausea and vomiting. The vomiting is somewhat better. Her , but the patient feels that she could be dehydrated. She still has the appointment on July 23 with a psychiatrist to discuss her current medication regimen. Onset of symptoms was several weeks ago. Patient describes nausea as moderate. Vomiting has occurred a few times over the past a few days. Vomitus is described as normal gastric contents. Symptoms have been associated with inability to keep down fluids. Patient denies alcohol overuse, fever, hematemesis, melena and possibility of pregnancy. Symptoms have stabilized. Evaluation to date has been seen here in the office. Treatment to date has been ondansetron. Patient's medications, allergies, past medical, surgical, social and family histories were reviewed and updated as appropriate. Review of Systems  Pertinent items are noted in HPI. Objective:      BP (!) 177/93   Pulse 90   Resp 16   Wt 102 lb 12.8 oz (46.6 kg)   SpO2 98%   BMI 31.37 kg/m²   General appearance: alert, appears stated age, cooperative and no distress  Lungs: clear to auscultation bilaterally  Heart: regular rate and rhythm, S1, S2 normal, no murmur, click, rub or gallop  Orophar: mucosa moist      Lauri was seen today for emesis. Diagnoses and all orders for this visit:    Intractable vomiting with nausea, unspecified vomiting type  -     ondansetron (ZOFRAN ODT) 4 MG disintegrating tablet; Take 1 tablet by mouth every 8 hours as needed for Nausea or Vomiting  -     promethazine (PHENERGAN) injection 25 mg; Inject 0.5 mLs into the muscle once  -     Basic Metabolic Panel; Future    . We'll start the patient on additional medication. I also will give her Phenergan today.   Get blood

## 2018-07-09 ENCOUNTER — TELEPHONE (OUTPATIENT)
Dept: FAMILY MEDICINE CLINIC | Age: 78
End: 2018-07-09

## 2018-07-09 NOTE — TELEPHONE ENCOUNTER
Advil and Tylenol and other than that without seeing her I don't know where all of her pain is out to evaluate

## 2018-07-16 ENCOUNTER — OFFICE VISIT (OUTPATIENT)
Dept: FAMILY MEDICINE CLINIC | Age: 78
End: 2018-07-16
Payer: MEDICARE

## 2018-07-16 VITALS
RESPIRATION RATE: 12 BRPM | HEART RATE: 86 BPM | DIASTOLIC BLOOD PRESSURE: 96 MMHG | BODY MASS INDEX: 20.56 KG/M2 | SYSTOLIC BLOOD PRESSURE: 161 MMHG | HEIGHT: 59 IN | OXYGEN SATURATION: 100 % | WEIGHT: 102 LBS

## 2018-07-16 DIAGNOSIS — F41.9 ANXIETY: ICD-10-CM

## 2018-07-16 DIAGNOSIS — N30.01 ACUTE CYSTITIS WITH HEMATURIA: Primary | ICD-10-CM

## 2018-07-16 PROCEDURE — G8420 CALC BMI NORM PARAMETERS: HCPCS | Performed by: FAMILY MEDICINE

## 2018-07-16 PROCEDURE — G8399 PT W/DXA RESULTS DOCUMENT: HCPCS | Performed by: FAMILY MEDICINE

## 2018-07-16 PROCEDURE — 3288F FALL RISK ASSESSMENT DOCD: CPT | Performed by: FAMILY MEDICINE

## 2018-07-16 PROCEDURE — 1123F ACP DISCUSS/DSCN MKR DOCD: CPT | Performed by: FAMILY MEDICINE

## 2018-07-16 PROCEDURE — 1036F TOBACCO NON-USER: CPT | Performed by: FAMILY MEDICINE

## 2018-07-16 PROCEDURE — 1101F PT FALLS ASSESS-DOCD LE1/YR: CPT | Performed by: FAMILY MEDICINE

## 2018-07-16 PROCEDURE — 4040F PNEUMOC VAC/ADMIN/RCVD: CPT | Performed by: FAMILY MEDICINE

## 2018-07-16 PROCEDURE — G8427 DOCREV CUR MEDS BY ELIG CLIN: HCPCS | Performed by: FAMILY MEDICINE

## 2018-07-16 PROCEDURE — 99214 OFFICE O/P EST MOD 30 MIN: CPT | Performed by: FAMILY MEDICINE

## 2018-07-16 PROCEDURE — 1090F PRES/ABSN URINE INCON ASSESS: CPT | Performed by: FAMILY MEDICINE

## 2018-07-16 PROCEDURE — 96372 THER/PROPH/DIAG INJ SC/IM: CPT | Performed by: FAMILY MEDICINE

## 2018-07-16 PROCEDURE — G8510 SCR DEP NEG, NO PLAN REQD: HCPCS | Performed by: FAMILY MEDICINE

## 2018-07-16 RX ORDER — CIPROFLOXACIN 500 MG/1
500 TABLET, FILM COATED ORAL 2 TIMES DAILY
Qty: 20 TABLET | Refills: 0 | Status: SHIPPED | OUTPATIENT
Start: 2018-07-16 | End: 2018-07-26

## 2018-07-16 RX ORDER — PROMETHAZINE HYDROCHLORIDE 50 MG/ML
50 INJECTION, SOLUTION INTRAMUSCULAR; INTRAVENOUS ONCE
Status: COMPLETED | OUTPATIENT
Start: 2018-07-16 | End: 2018-07-16

## 2018-07-16 RX ORDER — CEFTRIAXONE 1 G/1
1 INJECTION, POWDER, FOR SOLUTION INTRAMUSCULAR; INTRAVENOUS ONCE
Status: COMPLETED | OUTPATIENT
Start: 2018-07-16 | End: 2018-07-16

## 2018-07-16 RX ADMIN — CEFTRIAXONE 1 G: 1 INJECTION, POWDER, FOR SOLUTION INTRAMUSCULAR; INTRAVENOUS at 16:46

## 2018-07-16 RX ADMIN — PROMETHAZINE HYDROCHLORIDE 50 MG: 50 INJECTION, SOLUTION INTRAMUSCULAR; INTRAVENOUS at 16:47

## 2018-07-16 ASSESSMENT — PATIENT HEALTH QUESTIONNAIRE - PHQ9
SUM OF ALL RESPONSES TO PHQ9 QUESTIONS 1 & 2: 0
1. LITTLE INTEREST OR PLEASURE IN DOING THINGS: 0
SUM OF ALL RESPONSES TO PHQ QUESTIONS 1-9: 0
2. FEELING DOWN, DEPRESSED OR HOPELESS: 0

## 2018-07-16 NOTE — PROGRESS NOTES
pain and blood in stool. Endocrine: Negative for cold intolerance and polyuria. Genitourinary: Negative for dysuria and hematuria. Musculoskeletal: Negative. Skin: Negative for rash. Allergic/Immunologic: Negative. Neurological: Negative. Negative for dizziness, weakness and numbness. Hematological: Negative. Negative for adenopathy. Does not bruise/bleed easily. Psychiatric/Behavioral: Negative for sleep disturbance, dysphoric mood and  decreased concentration. The patient is not nervous/anxious. Objective:     Physical Exam:     Nursing note and vitals reviewed. BP (!) 161/96   Pulse 86   Resp 12   Ht 4' 11\" (1.499 m)   Wt 102 lb (46.3 kg)   SpO2 100%   BMI 20.60 kg/m²   Constitutional: She is oriented to person, place, and time. She   appears well-developed and well-nourished. HENT:   Head: Normocephalic and atraumatic. Right Ear: External ear normal. Tympanic membrane is not erythematous. No middle ear effusion. Left Ear: External ear normal. Tympanic membrane is not erythematous. No middle ear effusion. Nose: No mucosal edema. Mouth/Throat: Oropharynx is clear and moist. No posterior oropharyngeal erythema. Eyes: Conjunctivae and EOM are normal. Pupils are equal, round, and reactive to light. Neck: Normal range of motion. Neck supple. No thyromegaly present. Cardiovascular: Normal rate, regular rhythm and normal heart sounds. No murmur heard. Pulmonary/Chest: Effort normal and breath sounds normal. She has no wheezes. Shehas no rales. Abdominal: Soft. Bowel sounds are normal. She exhibits no distension and no mass. There is no tenderness. There is no rebound and no guarding. Genitourinary/Anorectal:deferred  Musculoskeletal: Normal range of motion. She exhibits no edema or tenderness. Lymphadenopathy: She has no cervical adenopathy. Neurological: She is alert and oriented to person, place, and time. She has normal reflexes.    Skin: Skin is warm

## 2018-07-17 ENCOUNTER — TELEPHONE (OUTPATIENT)
Dept: FAMILY MEDICINE CLINIC | Age: 78
End: 2018-07-17

## 2018-07-30 ENCOUNTER — OFFICE VISIT (OUTPATIENT)
Dept: FAMILY MEDICINE CLINIC | Age: 78
End: 2018-07-30
Payer: MEDICARE

## 2018-07-30 ENCOUNTER — HOSPITAL ENCOUNTER (OUTPATIENT)
Age: 78
Setting detail: SPECIMEN
Discharge: HOME | End: 2018-07-30
Payer: MEDICARE

## 2018-07-30 VITALS
HEART RATE: 75 BPM | RESPIRATION RATE: 16 BRPM | WEIGHT: 100 LBS | HEIGHT: 59 IN | BODY MASS INDEX: 20.16 KG/M2 | SYSTOLIC BLOOD PRESSURE: 185 MMHG | DIASTOLIC BLOOD PRESSURE: 88 MMHG

## 2018-07-30 DIAGNOSIS — R63.0 DECREASED APPETITE: ICD-10-CM

## 2018-07-30 DIAGNOSIS — R30.0 DYSURIA: ICD-10-CM

## 2018-07-30 DIAGNOSIS — F41.9 ANXIETY: ICD-10-CM

## 2018-07-30 DIAGNOSIS — F41.9 ANXIETY: Primary | ICD-10-CM

## 2018-07-30 DIAGNOSIS — I10 ESSENTIAL HYPERTENSION: ICD-10-CM

## 2018-07-30 LAB
BILIRUBIN, POC: ABNORMAL
BLOOD URINE, POC: ABNORMAL
CLARITY, POC: ABNORMAL
COLOR, POC: YELLOW
GLUCOSE URINE, POC: ABNORMAL
KETONES, POC: ABNORMAL
LEUKOCYTE EST, POC: ABNORMAL
NITRITE, POC: ABNORMAL
PH, POC: 6
PROTEIN, POC: ABNORMAL
SPECIFIC GRAVITY, POC: 1.02
UROBILINOGEN, POC: ABNORMAL

## 2018-07-30 PROCEDURE — G8399 PT W/DXA RESULTS DOCUMENT: HCPCS | Performed by: FAMILY MEDICINE

## 2018-07-30 PROCEDURE — 1036F TOBACCO NON-USER: CPT | Performed by: FAMILY MEDICINE

## 2018-07-30 PROCEDURE — 4040F PNEUMOC VAC/ADMIN/RCVD: CPT | Performed by: FAMILY MEDICINE

## 2018-07-30 PROCEDURE — G8427 DOCREV CUR MEDS BY ELIG CLIN: HCPCS | Performed by: FAMILY MEDICINE

## 2018-07-30 PROCEDURE — 81002 URINALYSIS NONAUTO W/O SCOPE: CPT | Performed by: FAMILY MEDICINE

## 2018-07-30 PROCEDURE — 1101F PT FALLS ASSESS-DOCD LE1/YR: CPT | Performed by: FAMILY MEDICINE

## 2018-07-30 PROCEDURE — 99214 OFFICE O/P EST MOD 30 MIN: CPT | Performed by: FAMILY MEDICINE

## 2018-07-30 PROCEDURE — G8420 CALC BMI NORM PARAMETERS: HCPCS | Performed by: FAMILY MEDICINE

## 2018-07-30 PROCEDURE — 1090F PRES/ABSN URINE INCON ASSESS: CPT | Performed by: FAMILY MEDICINE

## 2018-07-30 PROCEDURE — 1123F ACP DISCUSS/DSCN MKR DOCD: CPT | Performed by: FAMILY MEDICINE

## 2018-07-30 PROCEDURE — 96372 THER/PROPH/DIAG INJ SC/IM: CPT | Performed by: FAMILY MEDICINE

## 2018-07-30 RX ORDER — CEFTRIAXONE 1 G/1
1 INJECTION, POWDER, FOR SOLUTION INTRAMUSCULAR; INTRAVENOUS ONCE
Status: COMPLETED | OUTPATIENT
Start: 2018-07-30 | End: 2018-07-30

## 2018-07-30 RX ORDER — PROMETHAZINE HYDROCHLORIDE 50 MG/ML
50 INJECTION, SOLUTION INTRAMUSCULAR; INTRAVENOUS ONCE
Status: COMPLETED | OUTPATIENT
Start: 2018-07-30 | End: 2018-07-30

## 2018-07-30 RX ORDER — CLONAZEPAM 0.5 MG/1
0.5 TABLET ORAL 2 TIMES DAILY PRN
Qty: 60 TABLET | Refills: 0 | Status: SHIPPED | OUTPATIENT
Start: 2018-07-30 | End: 2019-05-21

## 2018-07-30 RX ORDER — RISPERIDONE 3 MG/1
3 TABLET, FILM COATED ORAL 2 TIMES DAILY
Qty: 60 TABLET | Refills: 11 | Status: SHIPPED | OUTPATIENT
Start: 2018-07-30 | End: 2019-05-21 | Stop reason: SDUPTHER

## 2018-07-30 RX ORDER — PROMETHAZINE HYDROCHLORIDE 25 MG/1
25 TABLET ORAL EVERY 6 HOURS PRN
Qty: 30 TABLET | Refills: 0 | Status: SHIPPED | OUTPATIENT
Start: 2018-07-30 | End: 2018-08-15 | Stop reason: SDUPTHER

## 2018-07-30 RX ADMIN — CEFTRIAXONE 1 G: 1 INJECTION, POWDER, FOR SOLUTION INTRAMUSCULAR; INTRAVENOUS at 17:12

## 2018-07-30 RX ADMIN — PROMETHAZINE HYDROCHLORIDE 50 MG: 50 INJECTION, SOLUTION INTRAMUSCULAR; INTRAVENOUS at 17:13

## 2018-07-30 NOTE — PROGRESS NOTES
Allergies    Social History   Substance Use Topics    Smoking status: Former Smoker     Packs/day: 0.50     Years: 30.00     Types: Cigarettes     Quit date: 1/16/1980    Smokeless tobacco: Never Used    Alcohol use Yes      Comment: socially        LDL Calculated (mg/dL)   Date Value   11/01/2013 50     HDL (mg/dL)   Date Value   11/01/2013 74 (A)     BUN (mg/dL)   Date Value   05/29/2018 7 (L)     CREATININE (mg/dL)   Date Value   05/29/2018 0.51     Glucose (mg/dL)   Date Value   05/29/2018 80     Hemoglobin A1C (%)   Date Value   05/29/2018 5.3              Subjective:      HPI  She is here today continuing to feel weak headache nauseated she again stopped all of her medications she is not eating well. Her blood pressure is niranjan high    Review of Systems:     Constitutional: Negative for fever, appetite change and fatigue. Family social and medical history reviewed and unchanged     HENT: Negative. Negative for nosebleeds, trouble swallowing and neck pain. Eyes: Negative for photophobia and visual disturbance. Respiratory: Negative. Negative for chest tightness and shortness of breath. Cardiovascular: Negative. Negative for chest pain and leg swelling. Gastrointestinal: Negative. Negative for abdominal pain and blood in stool. Endocrine: Negative for cold intolerance and polyuria. Genitourinary: Negative for dysuria and hematuria. Musculoskeletal: Negative. Skin: Negative for rash. Allergic/Immunologic: Negative. Neurological: Negative. Negative for dizziness, weakness and numbness. Hematological: Negative. Negative for adenopathy. Does not bruise/bleed easily. Psychiatric/Behavioral: Negative for sleep disturbance, dysphoric mood and  decreased concentration. The patient is not nervous/anxious. Objective:     Physical Exam:     Nursing note and vitals reviewed. BP (!) 185/88   Pulse 75   Resp 16   Ht 4' 11\" (1.499 m)   Wt 100 lb (45.4 kg)   Breastfeeding? No   BMI 20.20 kg/m²   Constitutional: She is oriented to person, place, and time. She   appears well-developed and well-nourished. HENT:   Head: Normocephalic and atraumatic. Right Ear: External ear normal. Tympanic membrane is not erythematous. No middle ear effusion. Left Ear: External ear normal. Tympanic membrane is not erythematous. No middle ear effusion. Nose: No mucosal edema. Mouth/Throat: Oropharynx is clear and moist. No posterior oropharyngeal erythema. Eyes: Conjunctivae and EOM are normal. Pupils are equal, round, and reactive to light. Neck: Normal range of motion. Neck supple. No thyromegaly present. Cardiovascular: Normal rate, regular rhythm and normal heart sounds. No murmur heard. Pulmonary/Chest: Effort normal and breath sounds normal. She has no wheezes. Shehas no rales. Abdominal: Soft. Bowel sounds are normal. She exhibits no distension and no mass. There is no tenderness. There is no rebound and no guarding. Genitourinary/Anorectal:deferred  Musculoskeletal: Normal range of motion. She exhibits no edema or tenderness. Lymphadenopathy: She has no cervical adenopathy. Neurological: She is alert and oriented to person, place, and time. She has normal reflexes. Skin: Skin is warm and dry. No rash noted. Psychiatric: She has a normal mood and affect. Her   behavior is normal.       Assessment:      1. Anxiety    2. Decreased appetite    3. Essential hypertension          Plan:      Call or return to clinic prn if these symptoms worsen or fail to improve as anticipated. I have reviewed the instructions with the patient, answering all questions to her satisfaction. No Follow-up on file. No orders of the defined types were placed in this encounter. No orders of the defined types were placed in this encounter.    reviewed all medications made multiple adjustments discussed this all with her daughter-in-law  We'll recheck urine    Electronically signed by Anusha Christopher, DO on 7/30/2018 at 3:15 PM

## 2018-07-30 NOTE — TELEPHONE ENCOUNTER
Pt is suppose to be on: Klonopin . 5 mg, she has none in house, Risperidone 3mg, she has none in house.  Can you send the meds to [facility] on [facility]? Thank you

## 2018-07-31 LAB
CULTURE: NORMAL
Lab: NORMAL
SPECIMEN DESCRIPTION: NORMAL
STATUS: NORMAL

## 2018-08-15 DIAGNOSIS — S33.5XXA LUMBAR SPRAIN, INITIAL ENCOUNTER: ICD-10-CM

## 2018-08-15 RX ORDER — TIZANIDINE 2 MG/1
TABLET ORAL
Qty: 30 TABLET | Refills: 0 | Status: SHIPPED | OUTPATIENT
Start: 2018-08-15 | End: 2019-05-21

## 2018-08-15 RX ORDER — PROMETHAZINE HYDROCHLORIDE 25 MG/1
TABLET ORAL
Qty: 30 TABLET | Refills: 0 | Status: SHIPPED | OUTPATIENT
Start: 2018-08-15 | End: 2019-05-21

## 2018-08-16 ENCOUNTER — TELEPHONE (OUTPATIENT)
Dept: FAMILY MEDICINE CLINIC | Age: 78
End: 2018-08-16

## 2018-08-20 ENCOUNTER — TELEPHONE (OUTPATIENT)
Dept: FAMILY MEDICINE CLINIC | Age: 78
End: 2018-08-20

## 2018-08-20 RX ORDER — OLANZAPINE 5 MG/1
5 TABLET ORAL NIGHTLY
Qty: 30 TABLET | Refills: 5 | Status: SHIPPED | OUTPATIENT
Start: 2018-08-20 | End: 2019-02-09 | Stop reason: SDUPTHER

## 2018-08-20 NOTE — TELEPHONE ENCOUNTER
Patient  states that the Risperdal has been giving her headaches. Patient wants to know if something else could be sent to the pharmacy? Please Advise. Thank you.

## 2018-09-25 DIAGNOSIS — I10 ESSENTIAL HYPERTENSION: ICD-10-CM

## 2018-09-25 RX ORDER — LISINOPRIL 20 MG/1
TABLET ORAL
Qty: 30 TABLET | Refills: 3 | Status: SHIPPED | OUTPATIENT
Start: 2018-09-25 | End: 2019-01-23 | Stop reason: SDUPTHER

## 2018-12-21 ENCOUNTER — TELEPHONE (OUTPATIENT)
Dept: FAMILY MEDICINE CLINIC | Age: 78
End: 2018-12-21

## 2018-12-21 DIAGNOSIS — R11.2 INTRACTABLE VOMITING WITH NAUSEA, UNSPECIFIED VOMITING TYPE: ICD-10-CM

## 2018-12-21 RX ORDER — ONDANSETRON 4 MG/1
4 TABLET, ORALLY DISINTEGRATING ORAL EVERY 8 HOURS PRN
Qty: 20 TABLET | Refills: 1 | Status: SHIPPED | OUTPATIENT
Start: 2018-12-21 | End: 2019-05-21

## 2019-02-11 RX ORDER — OLANZAPINE 5 MG/1
TABLET ORAL
Qty: 30 TABLET | Refills: 4 | Status: SHIPPED | OUTPATIENT
Start: 2019-02-11 | End: 2019-05-21

## 2019-04-16 DIAGNOSIS — I10 ESSENTIAL HYPERTENSION: ICD-10-CM

## 2019-04-18 ENCOUNTER — OFFICE VISIT (OUTPATIENT)
Dept: FAMILY MEDICINE CLINIC | Age: 79
End: 2019-04-18
Payer: MEDICARE

## 2019-04-18 VITALS
WEIGHT: 124 LBS | RESPIRATION RATE: 16 BRPM | OXYGEN SATURATION: 97 % | DIASTOLIC BLOOD PRESSURE: 84 MMHG | HEART RATE: 77 BPM | BODY MASS INDEX: 25 KG/M2 | SYSTOLIC BLOOD PRESSURE: 128 MMHG | HEIGHT: 59 IN

## 2019-04-18 DIAGNOSIS — R94.6 ABNORMAL THYROID EXAM: ICD-10-CM

## 2019-04-18 DIAGNOSIS — D50.8 IRON DEFICIENCY ANEMIA SECONDARY TO INADEQUATE DIETARY IRON INTAKE: ICD-10-CM

## 2019-04-18 DIAGNOSIS — E55.9 VITAMIN D DEFICIENCY: ICD-10-CM

## 2019-04-18 DIAGNOSIS — D69.6 DECREASED PLATELET COUNT (HCC): ICD-10-CM

## 2019-04-18 DIAGNOSIS — I10 ESSENTIAL HYPERTENSION: ICD-10-CM

## 2019-04-18 DIAGNOSIS — R73.09 ELEVATED GLUCOSE: ICD-10-CM

## 2019-04-18 DIAGNOSIS — R25.1 TREMOR: ICD-10-CM

## 2019-04-18 DIAGNOSIS — F41.9 ANXIETY: Primary | ICD-10-CM

## 2019-04-18 PROCEDURE — G8399 PT W/DXA RESULTS DOCUMENT: HCPCS | Performed by: FAMILY MEDICINE

## 2019-04-18 PROCEDURE — G8417 CALC BMI ABV UP PARAM F/U: HCPCS | Performed by: FAMILY MEDICINE

## 2019-04-18 PROCEDURE — 1036F TOBACCO NON-USER: CPT | Performed by: FAMILY MEDICINE

## 2019-04-18 PROCEDURE — 4040F PNEUMOC VAC/ADMIN/RCVD: CPT | Performed by: FAMILY MEDICINE

## 2019-04-18 PROCEDURE — 1123F ACP DISCUSS/DSCN MKR DOCD: CPT | Performed by: FAMILY MEDICINE

## 2019-04-18 PROCEDURE — 1090F PRES/ABSN URINE INCON ASSESS: CPT | Performed by: FAMILY MEDICINE

## 2019-04-18 PROCEDURE — G8427 DOCREV CUR MEDS BY ELIG CLIN: HCPCS | Performed by: FAMILY MEDICINE

## 2019-04-18 PROCEDURE — 99214 OFFICE O/P EST MOD 30 MIN: CPT | Performed by: FAMILY MEDICINE

## 2019-04-18 RX ORDER — LISINOPRIL 20 MG/1
20 TABLET ORAL DAILY
Qty: 90 TABLET | Refills: 1 | Status: SHIPPED | OUTPATIENT
Start: 2019-04-18 | End: 2019-10-13 | Stop reason: SDUPTHER

## 2019-04-18 RX ORDER — QUETIAPINE FUMARATE 50 MG/1
50 TABLET, FILM COATED ORAL NIGHTLY
Qty: 30 TABLET | Refills: 11 | Status: SHIPPED | OUTPATIENT
Start: 2019-04-18 | End: 2019-05-21

## 2019-04-18 RX ORDER — PRAMIPEXOLE DIHYDROCHLORIDE 0.25 MG/1
0.25 TABLET ORAL NIGHTLY
Qty: 90 TABLET | Refills: 3 | Status: SHIPPED | OUTPATIENT
Start: 2019-04-18 | End: 2019-05-21 | Stop reason: SINTOL

## 2019-04-18 ASSESSMENT — PATIENT HEALTH QUESTIONNAIRE - PHQ9
SUM OF ALL RESPONSES TO PHQ QUESTIONS 1-9: 0
SUM OF ALL RESPONSES TO PHQ9 QUESTIONS 1 & 2: 0
SUM OF ALL RESPONSES TO PHQ QUESTIONS 1-9: 0
1. LITTLE INTEREST OR PLEASURE IN DOING THINGS: 0
2. FEELING DOWN, DEPRESSED OR HOPELESS: 0

## 2019-04-18 NOTE — PROGRESS NOTES
[facility]  [facility] [address] [address]  Dept: [phone]      Ziyad Corbin is a 69 y.o. female who presents today for follow up on her  medical conditions as noted below. Chief Complaint   Patient presents with    Annual Exam    Leg Pain       Patient Active Problem List:     Anxiety     Insomnia     GERD (gastroesophageal reflux disease)     HTN (hypertension)     Acute blood loss anemia     Colitis, ischemic (HCC)     Syncope and collapse     Decreased platelet count (HCC)     Past Medical History:   Diagnosis Date    Anxiety     GERD (gastroesophageal reflux disease)     HTN (hypertension) 12/4/2014    Hyperthyroidism     have been running really high    Insomnia       Past Surgical History:   Procedure Laterality Date    APPENDECTOMY      HYSTERECTOMY       Family History   Problem Relation Age of Onset    Heart Disease Father        Current Outpatient Medications   Medication Sig Dispense Refill    QUEtiapine (SEROQUEL) 50 MG tablet Take 1 tablet by mouth nightly 30 tablet 11    pramipexole (MIRAPEX) 0.25 MG tablet Take 1 tablet by mouth nightly 90 tablet 3    OLANZapine (ZYPREXA) 5 MG tablet TAKE ONE TABLET BY MOUTH ONCE NIGHTLY 30 tablet 4    lisinopril (PRINIVIL;ZESTRIL) 20 MG tablet TAKE ONE TABLET BY MOUTH DAILY 90 tablet 0    ondansetron (ZOFRAN ODT) 4 MG disintegrating tablet Take 1 tablet by mouth every 8 hours as needed for Nausea or Vomiting 20 tablet 1    promethazine (PHENERGAN) 25 MG tablet TAKE ONE TABLET BY MOUTH EVERY 6 HOURS AS NEEDED FOR NAUSEA 30 tablet 0    tiZANidine (ZANAFLEX) 2 MG tablet TAKE ONE TABLET BY MOUTH EVERY NIGHT AT BEDTIME AS NEEDED FOR BACK PAIN 30 tablet 0    risperiDONE (RISPERDAL) 3 MG tablet Take 1 tablet by mouth 2 times daily 60 tablet 11    clonazePAM (KLONOPIN) 0.5 MG tablet Take 1 tablet by mouth 2 times daily as needed for Anxiety for up to 30 days. . 60 tablet 0  ondansetron (ZOFRAN) 4 MG tablet Take 1 tablet by mouth every 8 hours as needed for Nausea or Vomiting 60 tablet 0    omeprazole (PRILOSEC) 20 MG delayed release capsule TAKE ONE CAPSULE BY MOUTH DAILY 90 capsule 10     No current facility-administered medications for this visit. ALLERGIES:  No Known Allergies    Social History     Tobacco Use    Smoking status: Former Smoker     Packs/day: 0.50     Years: 30.00     Pack years: 15.00     Types: Cigarettes     Last attempt to quit: 1980     Years since quittin.2    Smokeless tobacco: Never Used   Substance Use Topics    Alcohol use: Yes     Comment: socially        LDL Calculated (mg/dL)   Date Value   2013 50     HDL (mg/dL)   Date Value   2013 74 (A)     BUN (mg/dL)   Date Value   2018 7 (L)     CREATININE (mg/dL)   Date Value   2018 0.51     Glucose (mg/dL)   Date Value   2018 80     Hemoglobin A1C (%)   Date Value   2018 5.3              Subjective:      HPI  Is here today for follow-up on her anxiety  I had put her on Zyprexa to help with anxiety as well as she was having problems with weight gain and poor appetite ongoing nausea vomiting it worked great because she gained 24 pounds. But she thinks that it is not working quite as well enough to help with her anxiety she still continues to be anxious  She is also continuing to have ongoing leg tremors and leg movements and nothing seems to be helping with that  She would like to try some other medications  She is way past due to get some laboratory studies done to check on all of her other issues with vitamin D deficiency she had had an elevated sugar at one time her blood pressure seems to be well controlled today    Review of Systems:     Constitutional: Negative for fever, appetite change and fatigue. Family social and medical history reviewed and unchanged     HENT: Negative. Negative for nosebleeds, trouble swallowing and neck pain.     Eyes: Negative for photophobia and visual disturbance. Respiratory: Negative. Negative for chest tightness and shortness of breath. Cardiovascular: Negative. Negative for chest pain and leg swelling. Gastrointestinal: Negative. Negative for abdominal pain and blood in stool. Endocrine: Negative for cold intolerance and polyuria. Genitourinary: Negative for dysuria and hematuria. Musculoskeletal: Negative. Skin: Negative for rash. Allergic/Immunologic: Negative. Neurological: Negative. Negative for dizziness, weakness and numbness. Hematological: Negative. Negative for adenopathy. Does not bruise/bleed easily. Psychiatric/Behavioral: Negative for sleep disturbance, dysphoric mood and  decreased concentration. The patient is not nervous/anxious. Objective:     Physical Exam:     Nursing note and vitals reviewed. /84   Pulse 77   Resp 16   Ht 4' 11\" (1.499 m)   Wt 124 lb (56.2 kg)   SpO2 97%   BMI 25.04 kg/m²   Constitutional: She is oriented to person, place, and time. She   appears well-developed and well-nourished. He does look much healthier with the increased weight   HENT:   Head: Normocephalic and atraumatic. Right Ear: External ear normal. Tympanic membrane is not erythematous. No middle ear effusion. Left Ear: External ear normal. Tympanic membrane is not erythematous. No middle ear effusion. Nose: No mucosal edema. Mouth/Throat: Oropharynx is clear and moist. No posterior oropharyngeal erythema. Eyes: Conjunctivae and EOM are normal. Pupils are equal, round, and reactive to light. Neck: Normal range of motion. Neck supple. No thyromegaly present. Cardiovascular: Normal rate, regular rhythm and normal heart sounds. No murmur heard. Pulmonary/Chest: Effort normal and breath sounds normal. She has no wheezes. Shehas no rales. Abdominal: Soft. Bowel sounds are normal. She exhibits no distension and no mass. There is no tenderness.  There is no rebound and no guarding. Genitourinary/Anorectal:deferred  Musculoskeletal: Normal range of motion. She exhibits no edema or tenderness. Lymphadenopathy: She has no cervical adenopathy. Neurological: She is alert and oriented to person, place, and time. She has normal reflexes. she does have ongoing leg movement it is not so much of a tremor is just constant lower leg movement  Skin: Skin is warm and dry. No rash noted. Psychiatric: She has a normal mood and affect. Her   behavior is normal.  appears to be much less anxious      Assessment:      1. Anxiety    2. Tremor    3. Essential hypertension    4. Iron deficiency anemia secondary to inadequate dietary iron intake    5. Vitamin D deficiency    6. Elevated glucose    7. Abnormal thyroid exam          Plan:      Call or return to clinic prn if these symptoms worsen or fail to improve as anticipated. I have reviewed the instructions with the patient, answering all questions to her satisfaction. No follow-ups on file.   Orders Placed This Encounter   Procedures    CBC Auto Differential     Standing Status:   Future     Standing Expiration Date:   4/17/2020    Comprehensive Metabolic Panel     Standing Status:   Future     Standing Expiration Date:   4/17/2020    Lipid Panel     Standing Status:   Future     Standing Expiration Date:   4/17/2020     Order Specific Question:   Is Patient Fasting?/# of Hours     Answer:   yes    T4, Free     Standing Status:   Future     Standing Expiration Date:   4/17/2020    TSH without Reflex     Standing Status:   Future     Standing Expiration Date:   4/17/2020    Vitamin D 25 Hydroxy     Standing Status:   Future     Standing Expiration Date:   4/17/2020    PTH, Intact     Standing Status:   Future     Standing Expiration Date:   4/17/2020    Calcium, Ionized     Standing Status:   Future     Standing Expiration Date:   4/17/2020     Orders Placed This Encounter   Medications    QUEtiapine (SEROQUEL) 50 MG tablet     Sig: Take 1 tablet by mouth nightly     Dispense:  30 tablet     Refill:  11    pramipexole (MIRAPEX) 0.25 MG tablet     Sig: Take 1 tablet by mouth nightly     Dispense:  90 tablet     Refill:  3     Try the above medications  To wean off Zyprexa  All them a little concerned about doing this because I really felt that this was working well for her  They're to follow-up in the office in one month to reassess how the meds are working  Electronically signed by Louis Laguna DO on 4/18/2019 at 10:53 AM

## 2019-04-18 NOTE — LETTER
[facility]  [address] [facility] [address]  Phone: [phone]  Fax: [phone]    Holden Brown DO        April 18, 2019     Patient: Simone Dixon   YOB: 1940   Date of Visit: 4/18/2019       To Whom It May Concern: It is my medical opinion that Simone Dixon requires a disability parking placard for the following reasons:  She cannot walk 200 feet without stopping to rest.  Duration of need: permanent    If you have any questions or concerns, please don't hesitate to call.     Sincerely,        Anusha Christopher, DO

## 2019-04-18 NOTE — LETTER
[facility]  [address] [address]  Phone: [phone]  Fax: [phone]    Vicente Bonds DO        April 18, 2019     Patient: Rica Mayorga   YOB: 1940   Date of Visit: 4/18/2019       To Whom It May Concern: It is my medical opinion that Rica Mayorga requires a disability parking placard for the following reasons:  She has limited walking ability due to a neurologic condition. Duration of need: 5 year    If you have any questions or concerns, please don't hesitate to call.     Sincerely,        Anusha Christopher, DO

## 2019-04-20 RX ORDER — LISINOPRIL 20 MG/1
TABLET ORAL
Qty: 90 TABLET | Refills: 0 | Status: SHIPPED | OUTPATIENT
Start: 2019-04-20 | End: 2019-12-09 | Stop reason: SDUPTHER

## 2019-05-21 ENCOUNTER — OFFICE VISIT (OUTPATIENT)
Dept: FAMILY MEDICINE CLINIC | Age: 79
End: 2019-05-21
Payer: MEDICARE

## 2019-05-21 VITALS
WEIGHT: 124 LBS | RESPIRATION RATE: 16 BRPM | HEART RATE: 95 BPM | SYSTOLIC BLOOD PRESSURE: 138 MMHG | BODY MASS INDEX: 25 KG/M2 | DIASTOLIC BLOOD PRESSURE: 71 MMHG | HEIGHT: 59 IN

## 2019-05-21 DIAGNOSIS — I10 ESSENTIAL HYPERTENSION: Primary | ICD-10-CM

## 2019-05-21 DIAGNOSIS — F41.9 ANXIETY: ICD-10-CM

## 2019-05-21 PROCEDURE — G8427 DOCREV CUR MEDS BY ELIG CLIN: HCPCS | Performed by: FAMILY MEDICINE

## 2019-05-21 PROCEDURE — 1036F TOBACCO NON-USER: CPT | Performed by: FAMILY MEDICINE

## 2019-05-21 PROCEDURE — 4040F PNEUMOC VAC/ADMIN/RCVD: CPT | Performed by: FAMILY MEDICINE

## 2019-05-21 PROCEDURE — 1123F ACP DISCUSS/DSCN MKR DOCD: CPT | Performed by: FAMILY MEDICINE

## 2019-05-21 PROCEDURE — 1090F PRES/ABSN URINE INCON ASSESS: CPT | Performed by: FAMILY MEDICINE

## 2019-05-21 PROCEDURE — 99214 OFFICE O/P EST MOD 30 MIN: CPT | Performed by: FAMILY MEDICINE

## 2019-05-21 PROCEDURE — G8417 CALC BMI ABV UP PARAM F/U: HCPCS | Performed by: FAMILY MEDICINE

## 2019-05-21 PROCEDURE — G8399 PT W/DXA RESULTS DOCUMENT: HCPCS | Performed by: FAMILY MEDICINE

## 2019-05-21 RX ORDER — RISPERIDONE 3 MG/1
3 TABLET, FILM COATED ORAL 2 TIMES DAILY
Qty: 60 TABLET | Refills: 11 | Status: SHIPPED | OUTPATIENT
Start: 2019-05-21 | End: 2020-11-17

## 2019-05-21 ASSESSMENT — PATIENT HEALTH QUESTIONNAIRE - PHQ9
2. FEELING DOWN, DEPRESSED OR HOPELESS: 0
SUM OF ALL RESPONSES TO PHQ9 QUESTIONS 1 & 2: 0
1. LITTLE INTEREST OR PLEASURE IN DOING THINGS: 0
SUM OF ALL RESPONSES TO PHQ QUESTIONS 1-9: 0
SUM OF ALL RESPONSES TO PHQ QUESTIONS 1-9: 0

## 2019-06-17 LAB
CHOLESTEROL, TOTAL: 145 MG/DL
CHOLESTEROL/HDL RATIO: 1.9
HDLC SERPL-MCNC: 78 MG/DL (ref 35–70)
LDL CHOLESTEROL CALCULATED: 49 MG/DL (ref 0–160)
PTH INTACT: 22.7
T4 FREE: 1.25
TRIGL SERPL-MCNC: 88 MG/DL
TSH SERPL DL<=0.05 MIU/L-ACNC: 0.59 UIU/ML
VITAMIN D 25-HYDROXY: 56.7
VITAMIN D2, 25 HYDROXY: NORMAL
VITAMIN D3,25 HYDROXY: NORMAL
VLDLC SERPL CALC-MCNC: 18 MG/DL

## 2019-06-19 DIAGNOSIS — E55.9 VITAMIN D DEFICIENCY: ICD-10-CM

## 2019-06-19 DIAGNOSIS — R11.2 INTRACTABLE VOMITING WITH NAUSEA, UNSPECIFIED VOMITING TYPE: ICD-10-CM

## 2019-06-19 DIAGNOSIS — R25.1 TREMOR: ICD-10-CM

## 2019-06-19 DIAGNOSIS — F41.9 ANXIETY: ICD-10-CM

## 2019-06-19 DIAGNOSIS — R94.6 ABNORMAL THYROID EXAM: ICD-10-CM

## 2019-06-19 DIAGNOSIS — D50.8 IRON DEFICIENCY ANEMIA SECONDARY TO INADEQUATE DIETARY IRON INTAKE: ICD-10-CM

## 2019-06-19 DIAGNOSIS — R73.09 ELEVATED GLUCOSE: ICD-10-CM

## 2019-06-19 DIAGNOSIS — I10 ESSENTIAL HYPERTENSION: ICD-10-CM

## 2019-06-28 ENCOUNTER — TELEPHONE (OUTPATIENT)
Dept: ADMINISTRATIVE | Age: 79
End: 2019-06-28

## 2019-06-28 NOTE — TELEPHONE ENCOUNTER
Patient daughter is calling because her leg is still shaking and want to know if the resperdal can be changed. Or do they need to make an appointment.   Please address

## 2019-07-08 RX ORDER — ZIPRASIDONE HYDROCHLORIDE 40 MG/1
40 CAPSULE ORAL NIGHTLY
Qty: 30 CAPSULE | Refills: 3 | Status: SHIPPED | OUTPATIENT
Start: 2019-07-08 | End: 2020-11-17

## 2019-07-12 ENCOUNTER — TELEPHONE (OUTPATIENT)
Dept: FAMILY MEDICINE CLINIC | Age: 79
End: 2019-07-12

## 2019-07-14 DIAGNOSIS — K21.9 GASTROESOPHAGEAL REFLUX DISEASE WITHOUT ESOPHAGITIS: ICD-10-CM

## 2019-07-17 RX ORDER — OMEPRAZOLE 20 MG/1
CAPSULE, DELAYED RELEASE ORAL
Qty: 90 CAPSULE | Refills: 9 | Status: SHIPPED | OUTPATIENT
Start: 2019-07-17 | End: 2019-12-09 | Stop reason: SDUPTHER

## 2019-07-24 ENCOUNTER — TELEPHONE (OUTPATIENT)
Dept: FAMILY MEDICINE CLINIC | Age: 79
End: 2019-07-24

## 2019-07-24 DIAGNOSIS — S33.5XXA LUMBAR SPRAIN, INITIAL ENCOUNTER: ICD-10-CM

## 2019-07-25 RX ORDER — TIZANIDINE 2 MG/1
2 TABLET ORAL DAILY
Qty: 30 TABLET | Refills: 0 | Status: SHIPPED | OUTPATIENT
Start: 2019-07-25 | End: 2020-11-17

## 2019-08-14 ENCOUNTER — OFFICE VISIT (OUTPATIENT)
Dept: FAMILY MEDICINE CLINIC | Age: 79
End: 2019-08-14
Payer: MEDICARE

## 2019-08-14 VITALS
BODY MASS INDEX: 24.2 KG/M2 | HEART RATE: 87 BPM | WEIGHT: 119.8 LBS | DIASTOLIC BLOOD PRESSURE: 70 MMHG | OXYGEN SATURATION: 97 % | SYSTOLIC BLOOD PRESSURE: 121 MMHG

## 2019-08-14 DIAGNOSIS — F41.9 ANXIETY: ICD-10-CM

## 2019-08-14 PROCEDURE — 1123F ACP DISCUSS/DSCN MKR DOCD: CPT | Performed by: FAMILY MEDICINE

## 2019-08-14 PROCEDURE — 1036F TOBACCO NON-USER: CPT | Performed by: FAMILY MEDICINE

## 2019-08-14 PROCEDURE — G8420 CALC BMI NORM PARAMETERS: HCPCS | Performed by: FAMILY MEDICINE

## 2019-08-14 PROCEDURE — 4040F PNEUMOC VAC/ADMIN/RCVD: CPT | Performed by: FAMILY MEDICINE

## 2019-08-14 PROCEDURE — 99213 OFFICE O/P EST LOW 20 MIN: CPT | Performed by: FAMILY MEDICINE

## 2019-08-14 PROCEDURE — 1090F PRES/ABSN URINE INCON ASSESS: CPT | Performed by: FAMILY MEDICINE

## 2019-08-14 PROCEDURE — G8427 DOCREV CUR MEDS BY ELIG CLIN: HCPCS | Performed by: FAMILY MEDICINE

## 2019-08-14 PROCEDURE — G8399 PT W/DXA RESULTS DOCUMENT: HCPCS | Performed by: FAMILY MEDICINE

## 2019-08-14 RX ORDER — CLONAZEPAM 0.5 MG/1
0.5 TABLET ORAL 2 TIMES DAILY PRN
Qty: 60 TABLET | Refills: 0 | Status: SHIPPED | OUTPATIENT
Start: 2019-08-14 | End: 2019-09-16 | Stop reason: SDUPTHER

## 2019-08-14 NOTE — PROGRESS NOTES
Discuss clonopin pt states it has helped in the past
She is oriented to person, place, and time. She   appears well-developed and well-nourished. HENT:   Head: Normocephalic and atraumatic. Right Ear: External ear normal. Tympanic membrane is not erythematous. No middle ear effusion. Left Ear: External ear normal. Tympanic membrane is not erythematous. No middle ear effusion. Nose: No mucosal edema. Mouth/Throat: Oropharynx is clear and moist. No posterior oropharyngeal erythema. Eyes: Conjunctivae and EOM are normal. Pupils are equal, round, and reactive to light. Neck: Normal range of motion. Neck supple. No thyromegaly present. Cardiovascular: Normal rate, regular rhythm and normal heart sounds. No murmur heard. Pulmonary/Chest: Effort normal and breath sounds normal. She has no wheezes. Shehas no rales. Abdominal: Soft. Bowel sounds are normal. She exhibits no distension and no mass. There is no tenderness. There is no rebound and no guarding. Genitourinary/Anorectal:deferred  Musculoskeletal: Normal range of motion. She exhibits no edema or tenderness. Lymphadenopathy: She has no cervical adenopathy. Neurological: She is alert and oriented to person, place, and time. She has normal reflexes. Skin: Skin is warm and dry. No rash noted. Psychiatric: She has a normal mood and affect. Her   behavior is normal.       Assessment:      1. Anxiety          Plan:      Call or return to clinic prn if these symptoms worsen or fail to improve as anticipated. I have reviewed the instructions with the patient, answering all questions to her satisfaction. No follow-ups on file. No orders of the defined types were placed in this encounter. Orders Placed This Encounter   Medications    clonazePAM (KLONOPIN) 0.5 MG tablet     Sig: Take 1 tablet by mouth 2 times daily as needed for Anxiety for up to 30 days.      Dispense:  60 tablet     Refill:  0     I have recommended once again that she go to a psychiatrist  She thought that the

## 2019-09-16 DIAGNOSIS — F41.9 ANXIETY: ICD-10-CM

## 2019-09-16 RX ORDER — CLONAZEPAM 0.5 MG/1
TABLET ORAL
Qty: 60 TABLET | Refills: 2 | Status: SHIPPED | OUTPATIENT
Start: 2019-09-16 | End: 2019-12-31

## 2019-10-13 DIAGNOSIS — I10 ESSENTIAL HYPERTENSION: ICD-10-CM

## 2019-10-14 RX ORDER — LISINOPRIL 20 MG/1
TABLET ORAL
Qty: 90 TABLET | Refills: 0 | Status: SHIPPED | OUTPATIENT
Start: 2019-10-14 | End: 2021-01-13 | Stop reason: SDUPTHER

## 2019-10-15 ENCOUNTER — HOSPITAL ENCOUNTER (OUTPATIENT)
Age: 79
Setting detail: SPECIMEN
Discharge: HOME | End: 2019-10-15
Payer: MEDICARE

## 2019-10-18 LAB
CULTURE: ABNORMAL
Lab: ABNORMAL
SPECIMEN DESCRIPTION: ABNORMAL

## 2019-12-09 ENCOUNTER — OFFICE VISIT (OUTPATIENT)
Dept: FAMILY MEDICINE CLINIC | Age: 79
End: 2019-12-09
Payer: MEDICARE

## 2019-12-09 VITALS
HEART RATE: 94 BPM | DIASTOLIC BLOOD PRESSURE: 84 MMHG | OXYGEN SATURATION: 97 % | WEIGHT: 113.2 LBS | BODY MASS INDEX: 22.86 KG/M2 | SYSTOLIC BLOOD PRESSURE: 133 MMHG

## 2019-12-09 DIAGNOSIS — K21.9 GASTROESOPHAGEAL REFLUX DISEASE WITHOUT ESOPHAGITIS: ICD-10-CM

## 2019-12-09 DIAGNOSIS — D64.9 ANEMIA, UNSPECIFIED TYPE: Primary | ICD-10-CM

## 2019-12-09 DIAGNOSIS — F41.9 ANXIETY: ICD-10-CM

## 2019-12-09 DIAGNOSIS — D50.8 IRON DEFICIENCY ANEMIA SECONDARY TO INADEQUATE DIETARY IRON INTAKE: ICD-10-CM

## 2019-12-09 DIAGNOSIS — I10 ESSENTIAL HYPERTENSION: ICD-10-CM

## 2019-12-09 LAB
BASOPHILS ABSOLUTE: NORMAL
BASOPHILS RELATIVE PERCENT: NORMAL
EOSINOPHILS ABSOLUTE: NORMAL
EOSINOPHILS RELATIVE PERCENT: NORMAL
FOLATE: NORMAL
HCT VFR BLD CALC: NORMAL %
HEMOGLOBIN: NORMAL
IRON: NORMAL
LYMPHOCYTES ABSOLUTE: NORMAL
LYMPHOCYTES RELATIVE PERCENT: NORMAL
MCH RBC QN AUTO: NORMAL PG
MCHC RBC AUTO-ENTMCNC: NORMAL G/DL
MCV RBC AUTO: NORMAL FL
MONOCYTES ABSOLUTE: NORMAL
MONOCYTES RELATIVE PERCENT: NORMAL
NEUTROPHILS ABSOLUTE: NORMAL
NEUTROPHILS RELATIVE PERCENT: NORMAL
PDW BLD-RTO: NORMAL %
PLATELET # BLD: NORMAL 10*3/UL
PMV BLD AUTO: NORMAL FL
RBC # BLD: NORMAL 10*6/UL
RETIC: NORMAL
TOTAL IRON BINDING CAPACITY: NORMAL
VITAMIN B-12: NORMAL
WBC # BLD: NORMAL 10*3/UL

## 2019-12-09 PROCEDURE — G8484 FLU IMMUNIZE NO ADMIN: HCPCS | Performed by: FAMILY MEDICINE

## 2019-12-09 PROCEDURE — 4040F PNEUMOC VAC/ADMIN/RCVD: CPT | Performed by: FAMILY MEDICINE

## 2019-12-09 PROCEDURE — 1123F ACP DISCUSS/DSCN MKR DOCD: CPT | Performed by: FAMILY MEDICINE

## 2019-12-09 PROCEDURE — G8427 DOCREV CUR MEDS BY ELIG CLIN: HCPCS | Performed by: FAMILY MEDICINE

## 2019-12-09 PROCEDURE — G8399 PT W/DXA RESULTS DOCUMENT: HCPCS | Performed by: FAMILY MEDICINE

## 2019-12-09 PROCEDURE — 99214 OFFICE O/P EST MOD 30 MIN: CPT | Performed by: FAMILY MEDICINE

## 2019-12-09 PROCEDURE — G8420 CALC BMI NORM PARAMETERS: HCPCS | Performed by: FAMILY MEDICINE

## 2019-12-09 PROCEDURE — 1036F TOBACCO NON-USER: CPT | Performed by: FAMILY MEDICINE

## 2019-12-09 PROCEDURE — 1090F PRES/ABSN URINE INCON ASSESS: CPT | Performed by: FAMILY MEDICINE

## 2019-12-09 RX ORDER — LISINOPRIL 20 MG/1
TABLET ORAL
Qty: 90 TABLET | Refills: 1 | Status: SHIPPED | OUTPATIENT
Start: 2019-12-09 | End: 2020-04-29 | Stop reason: SDUPTHER

## 2019-12-09 RX ORDER — OMEPRAZOLE 20 MG/1
CAPSULE, DELAYED RELEASE ORAL
Qty: 90 CAPSULE | Refills: 3 | Status: SHIPPED | OUTPATIENT
Start: 2019-12-09 | End: 2020-10-08 | Stop reason: SDUPTHER

## 2019-12-09 RX ORDER — RISPERIDONE 2 MG/1
2 TABLET, FILM COATED ORAL DAILY
Qty: 90 TABLET | Refills: 3 | Status: SHIPPED | OUTPATIENT
Start: 2019-12-09 | End: 2020-10-08 | Stop reason: SDUPTHER

## 2019-12-09 ASSESSMENT — PATIENT HEALTH QUESTIONNAIRE - PHQ9
2. FEELING DOWN, DEPRESSED OR HOPELESS: 0
SUM OF ALL RESPONSES TO PHQ QUESTIONS 1-9: 0
SUM OF ALL RESPONSES TO PHQ QUESTIONS 1-9: 0
1. LITTLE INTEREST OR PLEASURE IN DOING THINGS: 0
SUM OF ALL RESPONSES TO PHQ9 QUESTIONS 1 & 2: 0

## 2019-12-30 DIAGNOSIS — F41.9 ANXIETY: ICD-10-CM

## 2019-12-31 RX ORDER — CLONAZEPAM 0.5 MG/1
TABLET ORAL
Qty: 60 TABLET | Refills: 1 | Status: SHIPPED | OUTPATIENT
Start: 2019-12-31 | End: 2020-03-18

## 2020-03-18 RX ORDER — CLONAZEPAM 0.5 MG/1
TABLET ORAL
Qty: 60 TABLET | Refills: 0 | Status: SHIPPED | OUTPATIENT
Start: 2020-03-18 | End: 2020-04-20

## 2020-03-24 ENCOUNTER — TELEPHONE (OUTPATIENT)
Dept: FAMILY MEDICINE CLINIC | Age: 80
End: 2020-03-24

## 2020-04-20 RX ORDER — CLONAZEPAM 0.5 MG/1
TABLET ORAL
Qty: 60 TABLET | Refills: 0 | Status: SHIPPED | OUTPATIENT
Start: 2020-04-20 | End: 2020-05-28

## 2020-04-29 RX ORDER — LISINOPRIL 20 MG/1
TABLET ORAL
Qty: 90 TABLET | Refills: 0 | Status: SHIPPED | OUTPATIENT
Start: 2020-04-29 | End: 2020-07-20 | Stop reason: SDUPTHER

## 2020-05-28 RX ORDER — CLONAZEPAM 0.5 MG/1
TABLET ORAL
Qty: 60 TABLET | Refills: 0 | Status: SHIPPED | OUTPATIENT
Start: 2020-05-28 | End: 2020-07-15

## 2020-07-15 RX ORDER — CLONAZEPAM 0.5 MG/1
TABLET ORAL
Qty: 60 TABLET | Refills: 0 | Status: SHIPPED | OUTPATIENT
Start: 2020-07-15 | End: 2020-09-03

## 2020-07-20 RX ORDER — LISINOPRIL 20 MG/1
TABLET ORAL
Qty: 90 TABLET | Refills: 0 | Status: SHIPPED | OUTPATIENT
Start: 2020-07-20 | End: 2020-10-08 | Stop reason: SDUPTHER

## 2020-07-20 NOTE — TELEPHONE ENCOUNTER
Jessica Elliott is calling to request a refill on the following medication(s):    Last Visit Date (If Applicable):  68/2/4420    Next Visit Date:    Visit date not found    Medication Request:  Requested Prescriptions     Pending Prescriptions Disp Refills    lisinopril (PRINIVIL;ZESTRIL) 20 MG tablet 90 tablet 0     Sig: TAKE ONE TABLET BY MOUTH DAILY

## 2020-09-03 RX ORDER — CLONAZEPAM 0.5 MG/1
TABLET ORAL
Qty: 60 TABLET | Refills: 0 | Status: SHIPPED | OUTPATIENT
Start: 2020-09-03 | End: 2020-10-25

## 2020-10-02 ENCOUNTER — OFFICE VISIT (OUTPATIENT)
Dept: FAMILY MEDICINE CLINIC | Age: 80
End: 2020-10-02
Payer: MEDICARE

## 2020-10-02 VITALS
HEART RATE: 78 BPM | DIASTOLIC BLOOD PRESSURE: 56 MMHG | TEMPERATURE: 96.7 F | BODY MASS INDEX: 23.55 KG/M2 | SYSTOLIC BLOOD PRESSURE: 127 MMHG | WEIGHT: 116.8 LBS | HEIGHT: 59 IN | OXYGEN SATURATION: 96 %

## 2020-10-02 PROCEDURE — 1123F ACP DISCUSS/DSCN MKR DOCD: CPT | Performed by: FAMILY MEDICINE

## 2020-10-02 PROCEDURE — 1090F PRES/ABSN URINE INCON ASSESS: CPT | Performed by: FAMILY MEDICINE

## 2020-10-02 PROCEDURE — 99214 OFFICE O/P EST MOD 30 MIN: CPT | Performed by: FAMILY MEDICINE

## 2020-10-02 PROCEDURE — 4040F PNEUMOC VAC/ADMIN/RCVD: CPT | Performed by: FAMILY MEDICINE

## 2020-10-02 PROCEDURE — G8427 DOCREV CUR MEDS BY ELIG CLIN: HCPCS | Performed by: FAMILY MEDICINE

## 2020-10-02 PROCEDURE — G8420 CALC BMI NORM PARAMETERS: HCPCS | Performed by: FAMILY MEDICINE

## 2020-10-02 PROCEDURE — 1036F TOBACCO NON-USER: CPT | Performed by: FAMILY MEDICINE

## 2020-10-02 PROCEDURE — G8484 FLU IMMUNIZE NO ADMIN: HCPCS | Performed by: FAMILY MEDICINE

## 2020-10-02 PROCEDURE — 90694 VACC AIIV4 NO PRSRV 0.5ML IM: CPT | Performed by: FAMILY MEDICINE

## 2020-10-02 PROCEDURE — G0008 ADMIN INFLUENZA VIRUS VAC: HCPCS | Performed by: FAMILY MEDICINE

## 2020-10-02 PROCEDURE — G8399 PT W/DXA RESULTS DOCUMENT: HCPCS | Performed by: FAMILY MEDICINE

## 2020-10-02 ASSESSMENT — PATIENT HEALTH QUESTIONNAIRE - PHQ9
SUM OF ALL RESPONSES TO PHQ9 QUESTIONS 1 & 2: 0
SUM OF ALL RESPONSES TO PHQ QUESTIONS 1-9: 0
SUM OF ALL RESPONSES TO PHQ QUESTIONS 1-9: 0
1. LITTLE INTEREST OR PLEASURE IN DOING THINGS: 0
2. FEELING DOWN, DEPRESSED OR HOPELESS: 0

## 2020-10-02 NOTE — PROGRESS NOTES
Vaccine Information Sheet, \"Influenza - Inactivated\"  given to allyve, or parent/legal guardian of  allyve and verbalized understanding. Patient responses:    Have you ever had a reaction to a flu vaccine? No  Do you have any current illness? No  Have you ever had [place] Syndrome? No  Do you have a serious allergy to any of the following: Neomycin, Polymyxin, Thimerosal, eggs or egg products? No    Flu vaccine given per order. Please see immunization tab. Risks and benefits explained. Current VIS given.

## 2020-10-02 NOTE — PROGRESS NOTES
[facility]  [address] [place] [address] [address] [place] [address]  Dept: [phone]      Minesh Hines is a 79 y.o. female who presents today for follow up on her  medical conditions as noted below.       Chief Complaint   Patient presents with    Medication Refill       Patient Active Problem List:     Anxiety     Insomnia     GERD (gastroesophageal reflux disease)     HTN (hypertension)     Acute blood loss anemia     Colitis, ischemic (HCC)     Syncope and collapse     Decreased platelet count (HCC)     Past Medical History:   Diagnosis Date    Anxiety     GERD (gastroesophageal reflux disease)     HTN (hypertension) 12/4/2014    Hyperthyroidism     have been running really high    Insomnia       Past Surgical History:   Procedure Laterality Date    APPENDECTOMY      HYSTERECTOMY       Family History   Problem Relation Age of Onset    Heart Disease Father        Current Outpatient Medications   Medication Sig Dispense Refill    clonazePAM (KLONOPIN) 0.5 MG tablet TAKE ONE TABLET BY MOUTH TWICE A DAY AS NEEDED FOR ANXIETY 60 tablet 0    omeprazole (PRILOSEC) 20 MG delayed release capsule TAKE ONE CAPSULE BY MOUTH DAILY 90 capsule 3    risperiDONE (RISPERDAL) 2 MG tablet Take 1 tablet by mouth daily 90 tablet 3    lisinopril (PRINIVIL;ZESTRIL) 20 MG tablet TAKE ONE TABLET BY MOUTH DAILY 90 tablet 0    lisinopril (PRINIVIL;ZESTRIL) 20 MG tablet TAKE ONE TABLET BY MOUTH DAILY (Patient not taking: Reported on 10/2/2020) 90 tablet 0    tiZANidine (ZANAFLEX) 2 MG tablet Take 1 tablet by mouth daily (Patient not taking: Reported on 12/9/2019) 30 tablet 0    ziprasidone (GEODON) 40 MG capsule Take 1 capsule by mouth nightly (Patient not taking: Reported on 12/9/2019) 30 capsule 3    risperiDONE (RISPERDAL) 3 MG tablet Take 1 tablet by mouth 2 times daily (Patient not taking: Reported on 8/14/2019) 60 tablet 11     No current facility-administered medications for this visit. ALLERGIES:  No Known Allergies    Social History     Tobacco Use    Smoking status: Former Smoker     Packs/day: 0.50     Years: 30.00     Pack years: 15.00     Types: Cigarettes     Last attempt to quit: 1980     Years since quittin.7    Smokeless tobacco: Never Used   Substance Use Topics    Alcohol use: Yes     Comment: socially        LDL Calculated (mg/dL)   Date Value   2019 49     HDL (mg/dL)   Date Value   2019 78 (A)     BUN (mg/dL)   Date Value   2018 7 (L)     CREATININE (mg/dL)   Date Value   2018 0.51     Glucose (mg/dL)   Date Value   2018 80     Hemoglobin A1C (%)   Date Value   2018 5.3              Subjective:      HPI  She is here today for follow-up of all of her ongoing medical problems she states she is controlled she is taking all of her medications  She is using the Klonopin every night and occasionally during the day and that may be 4-5 times a week  She is due for laboratory studies has not done a mammogram in a very long time    Review of Systems:     Constitutional: Negative for fever, appetite change and fatigue. Family social and medical history reviewed and unchanged     HENT: Negative. Negative for nosebleeds, trouble swallowing and neck pain. Eyes: Negative for photophobia and visual disturbance. Respiratory: Negative. Negative for chest tightness and shortness of breath. Cardiovascular: Negative. Negative for chest pain and leg swelling. Gastrointestinal: Negative. Negative for abdominal pain and blood in stool. Endocrine: Negative for cold intolerance and polyuria. Genitourinary: Negative for dysuria and hematuria. Musculoskeletal: Negative. Skin: Negative for rash. Allergic/Immunologic: Negative. Neurological: Negative. Negative for dizziness, weakness and numbness. Hematological: Negative. Negative for adenopathy. Does not bruise/bleed easily. Psychiatric/Behavioral: Negative for sleep disturbance, dysphoric mood and  decreased concentration. The patient is not nervous/anxious. Objective:     Physical Exam:     Nursing note and vitals reviewed. BP (!) 127/56   Pulse 78   Temp 96.7 °F (35.9 °C)   Ht 4' 11\" (1.499 m)   Wt 116 lb 12.8 oz (53 kg)   SpO2 96%   BMI 23.59 kg/m²   Constitutional: She is oriented to person, place, and time. She   appears well-developed and well-nourished. HENT:   Head: Normocephalic and atraumatic. Right Ear: External ear normal. Tympanic membrane is not erythematous. No middle ear effusion. Left Ear: External ear normal. Tympanic membrane is not erythematous. No middle ear effusion. Nose: No mucosal edema. Mouth/Throat: Oropharynx is clear and moist. No posterior oropharyngeal erythema. Eyes: Conjunctivae and EOM are normal. Pupils are equal, round, and reactive to light. Neck: Normal range of motion. Neck supple. No thyromegaly present. Cardiovascular: Normal rate, regular rhythm and normal heart sounds. No murmur heard. Pulmonary/Chest: Effort normal and breath sounds normal. She has no wheezes. Shehas no rales. Abdominal: Soft. Bowel sounds are normal. She exhibits no distension and no mass. There is no tenderness. There is no rebound and no guarding. Genitourinary/Anorectal:deferred  Musculoskeletal: Normal range of motion. She exhibits no edema or tenderness. Lymphadenopathy: She has no cervical adenopathy. Neurological: She is alert and oriented to person, place, and time. She has normal reflexes. Skin: Skin is warm and dry. No rash noted. Psychiatric: She has a normal mood and affect. Her   behavior is normal.       Assessment:      1. Essential hypertension    2. Anemia, unspecified type    3. Gastroesophageal reflux disease without esophagitis    4. Anxiety    5. Iron deficiency anemia secondary to inadequate dietary iron intake    6. Primary insomnia    7.  Vitamin D

## 2020-10-08 RX ORDER — OMEPRAZOLE 20 MG/1
CAPSULE, DELAYED RELEASE ORAL
Qty: 90 CAPSULE | Refills: 3 | Status: SHIPPED | OUTPATIENT
Start: 2020-10-08 | End: 2021-01-13 | Stop reason: SDUPTHER

## 2020-10-08 RX ORDER — RISPERIDONE 2 MG/1
2 TABLET, FILM COATED ORAL DAILY
Qty: 90 TABLET | Refills: 3 | Status: SHIPPED | OUTPATIENT
Start: 2020-10-08 | End: 2021-02-05 | Stop reason: ALTCHOICE

## 2020-10-08 RX ORDER — LISINOPRIL 20 MG/1
TABLET ORAL
Qty: 90 TABLET | Refills: 0 | Status: SHIPPED | OUTPATIENT
Start: 2020-10-08 | End: 2020-11-17

## 2020-10-08 NOTE — TELEPHONE ENCOUNTER
Tyree Torre is calling to request a refill on the following medication(s):    Last Visit Date (If Applicable):  33/3/1972    Next Visit Date:    Visit date not found    Medication Request:  Requested Prescriptions     Pending Prescriptions Disp Refills    omeprazole (PRILOSEC) 20 MG delayed release capsule 90 capsule 3     Sig: TAKE ONE CAPSULE BY MOUTH DAILY    risperiDONE (RISPERDAL) 2 MG tablet 90 tablet 3     Sig: Take 1 tablet by mouth daily    lisinopril (PRINIVIL;ZESTRIL) 20 MG tablet 90 tablet 0     Sig: TAKE ONE TABLET BY MOUTH DAILY

## 2020-10-22 NOTE — TELEPHONE ENCOUNTER
Romelia Castleman is calling to request a refill on the following medication(s):    Last Visit Date (If Applicable):  26/3/3712    Next Visit Date:    Visit date not found    Medication Request:  Requested Prescriptions     Pending Prescriptions Disp Refills    clonazePAM (KLONOPIN) 0.5 MG tablet [Pharmacy Med Name: clonazePAM 0.5 MG TABLET] 60 tablet 0     Sig: TAKE ONE TABLET BY MOUTH TWICE A DAY AS NEEDED FOR ANXIETY

## 2020-10-25 RX ORDER — CLONAZEPAM 0.5 MG/1
TABLET ORAL
Qty: 60 TABLET | Refills: 0 | Status: SHIPPED | OUTPATIENT
Start: 2020-10-25 | End: 2022-03-10

## 2020-11-17 ENCOUNTER — OFFICE VISIT (OUTPATIENT)
Dept: FAMILY MEDICINE CLINIC | Age: 80
End: 2020-11-17
Payer: MEDICARE

## 2020-11-17 VITALS
HEART RATE: 78 BPM | WEIGHT: 115 LBS | HEIGHT: 60 IN | SYSTOLIC BLOOD PRESSURE: 134 MMHG | BODY MASS INDEX: 22.58 KG/M2 | DIASTOLIC BLOOD PRESSURE: 82 MMHG | OXYGEN SATURATION: 98 % | TEMPERATURE: 97.7 F

## 2020-11-17 PROBLEM — G24.01 TARDIVE DYSKINESIA: Status: ACTIVE | Noted: 2020-11-17

## 2020-11-17 PROBLEM — G25.81 RESTLESS LEGS: Status: ACTIVE | Noted: 2020-11-17

## 2020-11-17 PROBLEM — Z86.2 HISTORY OF ANEMIA: Status: ACTIVE | Noted: 2020-11-17

## 2020-11-17 PROBLEM — D62 ACUTE BLOOD LOSS ANEMIA: Status: RESOLVED | Noted: 2017-01-30 | Resolved: 2020-11-17

## 2020-11-17 PROBLEM — R55 SYNCOPE AND COLLAPSE: Status: RESOLVED | Noted: 2017-01-30 | Resolved: 2020-11-17

## 2020-11-17 PROCEDURE — 99215 OFFICE O/P EST HI 40 MIN: CPT | Performed by: NURSE PRACTITIONER

## 2020-11-17 PROCEDURE — 1090F PRES/ABSN URINE INCON ASSESS: CPT | Performed by: NURSE PRACTITIONER

## 2020-11-17 PROCEDURE — 1123F ACP DISCUSS/DSCN MKR DOCD: CPT | Performed by: NURSE PRACTITIONER

## 2020-11-17 PROCEDURE — G8484 FLU IMMUNIZE NO ADMIN: HCPCS | Performed by: NURSE PRACTITIONER

## 2020-11-17 PROCEDURE — 90715 TDAP VACCINE 7 YRS/> IM: CPT | Performed by: NURSE PRACTITIONER

## 2020-11-17 PROCEDURE — 1036F TOBACCO NON-USER: CPT | Performed by: NURSE PRACTITIONER

## 2020-11-17 PROCEDURE — G8431 POS CLIN DEPRES SCRN F/U DOC: HCPCS | Performed by: NURSE PRACTITIONER

## 2020-11-17 PROCEDURE — G8420 CALC BMI NORM PARAMETERS: HCPCS | Performed by: NURSE PRACTITIONER

## 2020-11-17 PROCEDURE — G8427 DOCREV CUR MEDS BY ELIG CLIN: HCPCS | Performed by: NURSE PRACTITIONER

## 2020-11-17 PROCEDURE — G8399 PT W/DXA RESULTS DOCUMENT: HCPCS | Performed by: NURSE PRACTITIONER

## 2020-11-17 PROCEDURE — 90471 IMMUNIZATION ADMIN: CPT | Performed by: NURSE PRACTITIONER

## 2020-11-17 PROCEDURE — 4040F PNEUMOC VAC/ADMIN/RCVD: CPT | Performed by: NURSE PRACTITIONER

## 2020-11-17 RX ORDER — SERTRALINE HYDROCHLORIDE 25 MG/1
25 TABLET, FILM COATED ORAL DAILY
Qty: 30 TABLET | Refills: 3 | Status: SHIPPED | OUTPATIENT
Start: 2020-11-17 | End: 2021-02-05 | Stop reason: ALTCHOICE

## 2020-11-17 ASSESSMENT — ENCOUNTER SYMPTOMS
VOMITING: 0
SHORTNESS OF BREATH: 0
CHEST TIGHTNESS: 0
DIARRHEA: 0
CONSTIPATION: 1
COUGH: 0
RESPIRATORY NEGATIVE: 1
BLOOD IN STOOL: 0
WHEEZING: 0
ALLERGIC/IMMUNOLOGIC NEGATIVE: 1
COLOR CHANGE: 0
NAUSEA: 0
EYES NEGATIVE: 1
ANAL BLEEDING: 0

## 2020-11-17 ASSESSMENT — PATIENT HEALTH QUESTIONNAIRE - PHQ9
9. THOUGHTS THAT YOU WOULD BE BETTER OFF DEAD, OR OF HURTING YOURSELF: 2
SUM OF ALL RESPONSES TO PHQ QUESTIONS 1-9: 15
2. FEELING DOWN, DEPRESSED OR HOPELESS: 3
8. MOVING OR SPEAKING SO SLOWLY THAT OTHER PEOPLE COULD HAVE NOTICED. OR THE OPPOSITE, BEING SO FIGETY OR RESTLESS THAT YOU HAVE BEEN MOVING AROUND A LOT MORE THAN USUAL: 0
6. FEELING BAD ABOUT YOURSELF - OR THAT YOU ARE A FAILURE OR HAVE LET YOURSELF OR YOUR FAMILY DOWN: 1
SUM OF ALL RESPONSES TO PHQ QUESTIONS 1-9: 15
4. FEELING TIRED OR HAVING LITTLE ENERGY: 1
SUM OF ALL RESPONSES TO PHQ9 QUESTIONS 1 & 2: 6
7. TROUBLE CONCENTRATING ON THINGS, SUCH AS READING THE NEWSPAPER OR WATCHING TELEVISION: 3
5. POOR APPETITE OR OVEREATING: 2
SUM OF ALL RESPONSES TO PHQ QUESTIONS 1-9: 13
10. IF YOU CHECKED OFF ANY PROBLEMS, HOW DIFFICULT HAVE THESE PROBLEMS MADE IT FOR YOU TO DO YOUR WORK, TAKE CARE OF THINGS AT HOME, OR GET ALONG WITH OTHER PEOPLE: 2
3. TROUBLE FALLING OR STAYING ASLEEP: 0
1. LITTLE INTEREST OR PLEASURE IN DOING THINGS: 3

## 2020-11-17 ASSESSMENT — COLUMBIA-SUICIDE SEVERITY RATING SCALE - C-SSRS
1. WITHIN THE PAST MONTH, HAVE YOU WISHED YOU WERE DEAD OR WISHED YOU COULD GO TO SLEEP AND NOT WAKE UP?: YES
6. HAVE YOU EVER DONE ANYTHING, STARTED TO DO ANYTHING, OR PREPARED TO DO ANYTHING TO END YOUR LIFE?: NO
2. HAVE YOU ACTUALLY HAD ANY THOUGHTS OF KILLING YOURSELF?: NO

## 2020-11-17 NOTE — PROGRESS NOTES
[facility]  [address] [facility]  Dept: [address] is a 79 y.o. female who presents today for her medical conditions/complaintsas noted below. Alec Tovar is here today c/o Establish Care; Shaking (in legs); and Anxiety    Past Medical History:   Diagnosis Date    Anxiety and depression     GERD (gastroesophageal reflux disease)     HTN (hypertension) 2014    Hyperthyroidism     unsure if accurate diagnosis     Restless legs 2020      Past Surgical History:   Procedure Laterality Date    APPENDECTOMY      HYSTERECTOMY         Family History   Problem Relation Age of Onset    Heart Disease Father        Social History     Tobacco Use    Smoking status: Former Smoker     Packs/day: 0.25     Years: 30.00     Pack years: 7.50     Types: Cigarettes     Last attempt to quit: 1980     Years since quittin.8    Smokeless tobacco: Never Used   Substance Use Topics    Alcohol use: Yes     Comment: socially       Current Outpatient Medications   Medication Sig Dispense Refill    clonazePAM (KLONOPIN) 0.5 MG tablet TAKE ONE TABLET BY MOUTH TWICE A DAY AS NEEDED FOR ANXIETY 60 tablet 0    omeprazole (PRILOSEC) 20 MG delayed release capsule TAKE ONE CAPSULE BY MOUTH DAILY 90 capsule 3    risperiDONE (RISPERDAL) 2 MG tablet Take 1 tablet by mouth daily 90 tablet 3    lisinopril (PRINIVIL;ZESTRIL) 20 MG tablet TAKE ONE TABLET BY MOUTH DAILY 90 tablet 0     No current facility-administered medications for this visit.       No Known Allergies      HPI:     HPI    Presents today c/o establish care , grand daughter present / partial historian   Used to see Dr. Angela Friedman   Specialists - none current     H/o HTN - lisinopril  Declines any medication side effects  Doesn't monitor blood pressure at home   Declines related chest pain, cough, dyspnea, dizziness or headaches     H/o GERD - c/o intermittent reflux with medication     H/o restless is 22.84 kg/m². Physical Exam  Constitutional:       General: She is not in acute distress. Appearance: She is well-developed. HENT:      Head: Normocephalic and atraumatic. Right Ear: External ear normal.      Left Ear: External ear normal.      Nose: Nose normal.   Eyes:      General: No scleral icterus. Right eye: No discharge. Left eye: No discharge. Conjunctiva/sclera: Conjunctivae normal.      Pupils: Pupils are equal, round, and reactive to light. Neck:      Musculoskeletal: Normal range of motion and neck supple. Trachea: No tracheal deviation. Cardiovascular:      Rate and Rhythm: Normal rate and regular rhythm. Heart sounds: Normal heart sounds. No murmur. No friction rub. No gallop. Pulmonary:      Effort: Pulmonary effort is normal. No tachypnea, accessory muscle usage or respiratory distress. Breath sounds: Normal breath sounds. No stridor. No decreased breath sounds, wheezing, rhonchi or rales. Abdominal:      Palpations: Abdomen is soft. Musculoskeletal: Normal range of motion. General: No tenderness or deformity. Skin:     General: Skin is warm and dry. Coloration: Skin is not pale. Findings: No erythema or rash. Neurological:      Mental Status: She is alert and oriented to person, place, and time. GCS: GCS eye subscore is 4. GCS verbal subscore is 5. GCS motor subscore is 6. Cranial Nerves: No facial asymmetry. Sensory: Sensation is intact. Gait: Gait is intact. Comments: Frequent involuntary movements of bilateral lower extremities    Psychiatric:         Attention and Perception: Attention and perception normal.         Mood and Affect: Mood and affect normal.         Speech: Speech normal.         Behavior: Behavior normal. Behavior is cooperative. Thought Content:  Thought content normal. Thought content is not paranoid or delusional. Thought content does not include homicidal or suicidal ideation. Thought content does not include homicidal or suicidal plan. Cognition and Memory: Cognition and memory normal.         Judgment: Judgment normal.           Assessment:         1. Encounter to establish care    2. Essential hypertension    3. Anxiety and depression    4. Positive depression screening    5. High risk medication use    6. Restless legs    7. Tardive dyskinesia    8. History of anemia    9. Gastroesophageal reflux disease, unspecified whether esophagitis present    10. Immunization due        Plan:     1. Encounter to establish care      2. Essential hypertension    - CBC Auto Differential; Future  - Comprehensive Metabolic Panel; Future  - TSH with Reflex; Future  - Magnesium; Future    BP stable at goal  Update labs for monitoring purposes  Continue medication as prescribed     3. Anxiety and depression    - External Referral To Psychiatry  - sertraline (ZOLOFT) 25 MG tablet; Take 1 tablet by mouth daily  Dispense: 30 tablet; Refill: 3    Uncontrolled, PHQ 9 reviewed   Medication management sub-optimal  On high risk medication(s)   Psychiatry referral for med management   Plan to wean Risperdal in conjunction with Psychiatry, concern for TD side effects   Low dose SSRI to bridge / help w/ depression / anxiety   Discussed wean of Klonopin to 0.5 tab qhs if tolerated   Plan would be to d/c entirely, risks with benzodiazepine's discussed   Patient and grand daughter agreeable     4. Positive depression screening    - External Referral To Psychiatry  - Positive Screen for Clinical Depression with a Documented Follow-up Plan   - sertraline (ZOLOFT) 25 MG tablet; Take 1 tablet by mouth daily  Dispense: 30 tablet;  Refill: 3    On the basis of positive PHQ-9 screening (PHQ-9 Total Score: 15), the following plan was implemented: additional evaluation and assessment performed, C-SSRS completed, medication prescribed - patient will call for any significant medication side effects or worsening symptoms of depression and referral for neuropsychiatric testing provided. Patient will follow-up in 4 week(s) with Psych, Neuro, PCP.    5. High risk medication use    - External Referral To Psychiatry    6. Restless legs    - Nimisha-Hill, Neurology, [facility]. Refer to Neurology  Refer to Psychiatry   See below     7. Tardive dyskinesia    - External Referral To Psychiatry  - Nimisha-Hill, Neurology, [facility]. I am concerned involuntary leg movements secondary to Risperdal  No active diagnosis to support antipsychotic   Recommended wean of medication in conjunction with psych   Encouraged close Psychiatry follow-up for medication management     8. History of anemia    - CBC Auto Differential; Future    9. Gastroesophageal reflux disease, unspecified whether esophagitis present    Poorly controlled  To further discussed next appointment   GI referral vs dose increase     10. Immunization due    - Tdap (age 6y and older) IM ([address])    Shingrix discussed and handout given on AVS     Of the 65 minute duration appointment visit, Saud Beckman CNP spent at least 50% of the face-to-face time in counseling, explanation of diagnosis, planning of further management, and answering all questions. Discussed use, benefit, and side effects of prescribed medications. All patient questions answered. Pt voiced understanding. Reviewed health maintenance. Instructed to continue current medications, diet and exercise. Patient agreedwith treatment plan. Follow up as directed.      Electronically signed by PABLITO Mcdonald CNP on 11/17/2020

## 2020-11-17 NOTE — PATIENT INSTRUCTIONS
if they dont remember having the disease. Chickenpox and shingles are related because they are caused by the same virus (varicella zoster virus). After a person recovers from chickenpox, the virus stays dormant (inactive) in the body. It can reactivate years later and cause shingles. If you had Zostavax in the recent past, you should wait at least eight weeks before getting Shingrix. Talk to your healthcare provider to determine the best time to get Shingrix. Shingrix is available in [employer] and pharmacies. To find doctors offices or pharmacies near you that offer the vaccine, visit [address] [place] [address]. If you have questions about Shingrix, talk with your healthcare provider. Who Should Not Get Shingrix? The side effects of the Shingrix are temporary, and usually last 2 to 3 days. While you may experience pain for a few days after getting Shingrix, the pain will be less severe than having shingles and the complications from the disease. You should not get Shingrix if you:  have ever had a severe allergic reaction to any component of the vaccine or after a dose of Shingrix   tested negative for immunity to varicella zoster virus. If you test negative, you should get chickenpox vaccine. currently have shingles   currently are pregnant or breastfeeding. Women who are pregnant or breastfeeding should wait to get Shingrix. If you have a minor acute (starts suddenly) illness, such as a cold, you may get Shingrix. But if you have a moderate or severe acute illness, you should usually wait until you recover before getting the vaccine. This includes anyone with a temperature of 101.3°F or higher. How Well Does Shingrix Work? Two doses of Shingrix provides strong protection against shingles and postherpetic neuralgia (PHN), the most common complication of shingles.   In adults 48to 71years old who got two doses, Shingrix was 97% effective in preventing shingles; among adults 70 years and older, Shingrix was 91% effective. In adults 48to 71years old who got two doses, Shingrix was 91% effective in preventing PHN; among adults 70 years and older, Shingrix was 89% effective. Shingrix protection remained high (more than 85%) in people 70 years and older throughout the four years following vaccination. Since your risk of shingles and PHN increases as you get older, it is important to have strong protection against shingles in your older years. Top of Page   What are the possible side effects of Shingrix? Studies show that Shingrix is safe. The vaccine helps your body create a strong defense against shingles. As a result, you are likely to have temporary side effects from getting the shots. The side effects may affect your ability to do normal daily activities for 2 to 3 days. Most people got a sore arm with mild or moderate pain after getting Shingrix, and some also had redness and swelling where they got the shot. Some people felt tired, had muscle pain, a headache, shivering, fever, stomach pain, or nausea. About 1 out of 6 people who got Shingrix experienced side effects that prevented them from doing regular activities. Symptoms went away on their own in about 2 to 3 days. Side effects were more common in younger people. You might have a reaction to the first or second dose of Shingrix, or both doses. If you experience side effects, you may choose to take over-the-counter pain medicine such as ibuprofen or acetaminophen. Severe allergic reactions to any vaccine are very rare. Signs of a severe allergic reaction can include hives, swelling of the face and throat, difficulty breathing, a fast heartbeat, dizziness, and weakness. These would start a few minutes to a few hours after the vaccination. If you have a severe allergic reaction or other emergency that cant wait, call 9-1-1 or go to the nearest hospital. Otherwise, call your doctor.   If you experience side effects from Shingrix, you should report them to the Vaccine Adverse Event Reporting System (VAERS). Your doctor might file this report, or you can do it yourself through the VAERS website, or by calling [phone]. If you have any questions about side effects from Shingrix, talk with your doctor. The shingles vaccine does not contain thimerosal (a preservative containing mercury). Top of Page   How Can I Pay For Shingrix? There are several ways shingles vaccine may be paid for:  Medicare  Medicare Part D plans cover the shingles vaccine, but there may be a cost to you depending on your plan. There may be a copay for the vaccine, or you may need to pay in full then get reimbursed for a certain amount. Medicare Part B does not cover the shingles vaccine. Medicaid  Medicaid may or may not cover the vaccine. Contact your insurer to find out. Private health insurance  Many private health insurance plans will cover the vaccine. Contact your insurer to find out. Vaccine assistance programs  Some pharmaceutical companies provide vaccines to eligible adults who cannot afford them. You may want to check with the vaccine , [employer], about Shingrix. If you do not currently have health insurance, learn more about affordable health coverage options. To find doctors offices or pharmacies near you that offer the vaccine, visit [address] [place], [place] [address].       Taken from the [employer]

## 2020-11-18 ENCOUNTER — HOSPITAL ENCOUNTER (OUTPATIENT)
Age: 80
Setting detail: SPECIMEN
Discharge: HOME | End: 2020-11-18
Payer: MEDICARE

## 2020-11-18 LAB
ABSOLUTE EOS #: 0.42 K/UL (ref 0–0.44)
ABSOLUTE IMMATURE GRANULOCYTE: <0.03 K/UL (ref 0–0.3)
ABSOLUTE LYMPH #: 1.47 K/UL (ref 1.1–3.7)
ABSOLUTE MONO #: 0.4 K/UL (ref 0.1–1.2)
ALBUMIN SERPL-MCNC: 3.9 G/DL (ref 3.5–5.2)
ALBUMIN/GLOBULIN RATIO: 1.3 (ref 1–2.5)
ALP BLD-CCNC: 66 U/L (ref 35–104)
ALT SERPL-CCNC: 29 U/L (ref 5–33)
ANION GAP SERPL CALCULATED.3IONS-SCNC: 16 MMOL/L (ref 9–17)
AST SERPL-CCNC: 36 U/L
BASOPHILS # BLD: 1 % (ref 0–2)
BASOPHILS ABSOLUTE: 0.05 K/UL (ref 0–0.2)
BILIRUB SERPL-MCNC: 0.62 MG/DL (ref 0.3–1.2)
BUN BLDV-MCNC: 17 MG/DL (ref 8–23)
BUN/CREAT BLD: ABNORMAL (ref 9–20)
CALCIUM SERPL-MCNC: 8.8 MG/DL (ref 8.6–10.4)
CHLORIDE BLD-SCNC: 109 MMOL/L (ref 98–107)
CO2: 21 MMOL/L (ref 20–31)
CREAT SERPL-MCNC: 0.68 MG/DL (ref 0.5–0.9)
DIFFERENTIAL TYPE: ABNORMAL
EOSINOPHILS RELATIVE PERCENT: 11 % (ref 1–4)
GFR AFRICAN AMERICAN: >60 ML/MIN
GFR NON-AFRICAN AMERICAN: >60 ML/MIN
GFR SERPL CREATININE-BSD FRML MDRD: ABNORMAL ML/MIN/{1.73_M2}
GFR SERPL CREATININE-BSD FRML MDRD: ABNORMAL ML/MIN/{1.73_M2}
GLUCOSE BLD-MCNC: 105 MG/DL (ref 70–99)
HCT VFR BLD CALC: 33.9 % (ref 36.3–47.1)
HEMOGLOBIN: 11.1 G/DL (ref 11.9–15.1)
IMMATURE GRANULOCYTES: 0 %
LYMPHOCYTES # BLD: 38 % (ref 24–43)
MAGNESIUM: 2 MG/DL (ref 1.6–2.6)
MCH RBC QN AUTO: 33.5 PG (ref 25.2–33.5)
MCHC RBC AUTO-ENTMCNC: 32.7 G/DL (ref 28.4–34.8)
MCV RBC AUTO: 102.4 FL (ref 82.6–102.9)
MONOCYTES # BLD: 10 % (ref 3–12)
NRBC AUTOMATED: 0 PER 100 WBC
PDW BLD-RTO: 11.9 % (ref 11.8–14.4)
PLATELET # BLD: 153 K/UL (ref 138–453)
PLATELET ESTIMATE: ABNORMAL
PMV BLD AUTO: 10.1 FL (ref 8.1–13.5)
POTASSIUM SERPL-SCNC: 4.3 MMOL/L (ref 3.7–5.3)
RBC # BLD: 3.31 M/UL (ref 3.95–5.11)
RBC # BLD: ABNORMAL 10*6/UL
SEG NEUTROPHILS: 40 % (ref 36–65)
SEGMENTED NEUTROPHILS ABSOLUTE COUNT: 1.56 K/UL (ref 1.5–8.1)
SODIUM BLD-SCNC: 146 MMOL/L (ref 135–144)
TOTAL PROTEIN: 6.8 G/DL (ref 6.4–8.3)
TSH SERPL DL<=0.05 MIU/L-ACNC: 1.05 MIU/L (ref 0.3–5)
WBC # BLD: 3.9 K/UL (ref 3.5–11.3)
WBC # BLD: ABNORMAL 10*3/UL

## 2020-11-19 LAB
ESTIMATED AVERAGE GLUCOSE: 103 MG/DL
HBA1C MFR BLD: 5.2 % (ref 4–6)

## 2020-12-18 ENCOUNTER — OFFICE VISIT (OUTPATIENT)
Dept: FAMILY MEDICINE CLINIC | Age: 80
End: 2020-12-18
Payer: MEDICARE

## 2020-12-18 ENCOUNTER — HOSPITAL ENCOUNTER (OUTPATIENT)
Age: 80
Setting detail: SPECIMEN
Discharge: HOME | End: 2020-12-18
Payer: MEDICARE

## 2020-12-18 VITALS
WEIGHT: 113 LBS | TEMPERATURE: 97.2 F | SYSTOLIC BLOOD PRESSURE: 132 MMHG | OXYGEN SATURATION: 98 % | HEART RATE: 72 BPM | DIASTOLIC BLOOD PRESSURE: 84 MMHG | BODY MASS INDEX: 22.44 KG/M2

## 2020-12-18 PROBLEM — D69.6 DECREASED PLATELET COUNT (HCC): Status: RESOLVED | Noted: 2017-01-30 | Resolved: 2020-12-18

## 2020-12-18 PROBLEM — D64.9 ANEMIA: Status: ACTIVE | Noted: 2020-11-17

## 2020-12-18 LAB
ABSOLUTE EOS #: 0.2 K/UL (ref 0–0.44)
ABSOLUTE IMMATURE GRANULOCYTE: <0.03 K/UL (ref 0–0.3)
ABSOLUTE LYMPH #: 1.32 K/UL (ref 1.1–3.7)
ABSOLUTE MONO #: 0.44 K/UL (ref 0.1–1.2)
BASOPHILS # BLD: 1 % (ref 0–2)
BASOPHILS ABSOLUTE: 0.04 K/UL (ref 0–0.2)
DIFFERENTIAL TYPE: ABNORMAL
EOSINOPHILS RELATIVE PERCENT: 4 % (ref 1–4)
FOLATE: >20 NG/ML
HCT VFR BLD CALC: 33.8 % (ref 36.3–47.1)
HEMOGLOBIN: 10.9 G/DL (ref 11.9–15.1)
IMMATURE GRANULOCYTES: 0 %
IRON SATURATION: 33 % (ref 20–55)
IRON: 84 UG/DL (ref 37–145)
LYMPHOCYTES # BLD: 29 % (ref 24–43)
MCH RBC QN AUTO: 33 PG (ref 25.2–33.5)
MCHC RBC AUTO-ENTMCNC: 32.2 G/DL (ref 28.4–34.8)
MCV RBC AUTO: 102.4 FL (ref 82.6–102.9)
MONOCYTES # BLD: 10 % (ref 3–12)
NRBC AUTOMATED: 0 PER 100 WBC
PDW BLD-RTO: 12 % (ref 11.8–14.4)
PLATELET # BLD: 146 K/UL (ref 138–453)
PLATELET ESTIMATE: ABNORMAL
PMV BLD AUTO: 10.3 FL (ref 8.1–13.5)
RBC # BLD: 3.3 M/UL (ref 3.95–5.11)
RBC # BLD: ABNORMAL 10*6/UL
SEG NEUTROPHILS: 56 % (ref 36–65)
SEGMENTED NEUTROPHILS ABSOLUTE COUNT: 2.6 K/UL (ref 1.5–8.1)
TOTAL IRON BINDING CAPACITY: 255 UG/DL (ref 250–450)
UNSATURATED IRON BINDING CAPACITY: 171 UG/DL (ref 112–347)
VITAMIN B-12: 1037 PG/ML (ref 232–1245)
WBC # BLD: 4.6 K/UL (ref 3.5–11.3)
WBC # BLD: ABNORMAL 10*3/UL

## 2020-12-18 PROCEDURE — G8427 DOCREV CUR MEDS BY ELIG CLIN: HCPCS | Performed by: NURSE PRACTITIONER

## 2020-12-18 PROCEDURE — 1090F PRES/ABSN URINE INCON ASSESS: CPT | Performed by: NURSE PRACTITIONER

## 2020-12-18 PROCEDURE — 4040F PNEUMOC VAC/ADMIN/RCVD: CPT | Performed by: NURSE PRACTITIONER

## 2020-12-18 PROCEDURE — 99214 OFFICE O/P EST MOD 30 MIN: CPT | Performed by: NURSE PRACTITIONER

## 2020-12-18 PROCEDURE — 1123F ACP DISCUSS/DSCN MKR DOCD: CPT | Performed by: NURSE PRACTITIONER

## 2020-12-18 PROCEDURE — G8420 CALC BMI NORM PARAMETERS: HCPCS | Performed by: NURSE PRACTITIONER

## 2020-12-18 PROCEDURE — 1036F TOBACCO NON-USER: CPT | Performed by: NURSE PRACTITIONER

## 2020-12-18 PROCEDURE — G8484 FLU IMMUNIZE NO ADMIN: HCPCS | Performed by: NURSE PRACTITIONER

## 2020-12-18 PROCEDURE — G8399 PT W/DXA RESULTS DOCUMENT: HCPCS | Performed by: NURSE PRACTITIONER

## 2020-12-18 RX ORDER — DOCUSATE SODIUM 100 MG/1
100 CAPSULE, LIQUID FILLED ORAL 2 TIMES DAILY PRN
Qty: 60 CAPSULE | Refills: 3 | Status: SHIPPED | OUTPATIENT
Start: 2020-12-18 | End: 2021-08-02

## 2020-12-18 RX ORDER — FERROUS SULFATE 325(65) MG
325 TABLET ORAL 2 TIMES DAILY
Qty: 60 TABLET | Refills: 3 | Status: SHIPPED | OUTPATIENT
Start: 2020-12-18 | End: 2021-04-20

## 2020-12-18 SDOH — ECONOMIC STABILITY: TRANSPORTATION INSECURITY
IN THE PAST 12 MONTHS, HAS THE LACK OF TRANSPORTATION KEPT YOU FROM MEDICAL APPOINTMENTS OR FROM GETTING MEDICATIONS?: NO

## 2020-12-18 SDOH — ECONOMIC STABILITY: TRANSPORTATION INSECURITY
IN THE PAST 12 MONTHS, HAS LACK OF TRANSPORTATION KEPT YOU FROM MEETINGS, WORK, OR FROM GETTING THINGS NEEDED FOR DAILY LIVING?: NO

## 2020-12-18 SDOH — ECONOMIC STABILITY: FOOD INSECURITY: WITHIN THE PAST 12 MONTHS, THE FOOD YOU BOUGHT JUST DIDN'T LAST AND YOU DIDN'T HAVE MONEY TO GET MORE.: NEVER TRUE

## 2020-12-18 SDOH — ECONOMIC STABILITY: INCOME INSECURITY: HOW HARD IS IT FOR YOU TO PAY FOR THE VERY BASICS LIKE FOOD, HOUSING, MEDICAL CARE, AND HEATING?: NOT HARD AT ALL

## 2020-12-18 SDOH — ECONOMIC STABILITY: FOOD INSECURITY: WITHIN THE PAST 12 MONTHS, YOU WORRIED THAT YOUR FOOD WOULD RUN OUT BEFORE YOU GOT MONEY TO BUY MORE.: NEVER TRUE

## 2020-12-18 ASSESSMENT — PATIENT HEALTH QUESTIONNAIRE - PHQ9
SUM OF ALL RESPONSES TO PHQ QUESTIONS 1-9: 2
2. FEELING DOWN, DEPRESSED OR HOPELESS: 1
1. LITTLE INTEREST OR PLEASURE IN DOING THINGS: 1
SUM OF ALL RESPONSES TO PHQ9 QUESTIONS 1 & 2: 2
SUM OF ALL RESPONSES TO PHQ QUESTIONS 1-9: 2
SUM OF ALL RESPONSES TO PHQ QUESTIONS 1-9: 2

## 2020-12-18 ASSESSMENT — ENCOUNTER SYMPTOMS
EYES NEGATIVE: 1
VOMITING: 0
COUGH: 0
DIARRHEA: 0
NAUSEA: 0
RESPIRATORY NEGATIVE: 1
CONSTIPATION: 1
ANAL BLEEDING: 0
BLOOD IN STOOL: 0
COLOR CHANGE: 0
ALLERGIC/IMMUNOLOGIC NEGATIVE: 1
SHORTNESS OF BREATH: 0

## 2020-12-18 NOTE — PROGRESS NOTES
[facility]  [address] [place] [address]  Dept: Dustin Andersen is a 79 y.o. female who presents today for her medical conditions/complaintsas noted below. Nolan Malone is here today c/o Depression and Anxiety    Past Medical History:   Diagnosis Date    Anxiety and depression     Colitis     w/ inpatient colonscopy polyp removal    GERD (gastroesophageal reflux disease)     HTN (hypertension) 2014    Restless legs 2020      Past Surgical History:   Procedure Laterality Date    APPENDECTOMY      HYSTERECTOMY         Family History   Problem Relation Age of Onset    Heart Disease Father        Social History     Tobacco Use    Smoking status: Former Smoker     Packs/day: 0.25     Years: 30.00     Pack years: 7.50     Types: Cigarettes     Quit date: 1980     Years since quittin.9    Smokeless tobacco: Never Used   Substance Use Topics    Alcohol use: Yes     Comment: socially       Current Outpatient Medications   Medication Sig Dispense Refill    sertraline (ZOLOFT) 25 MG tablet Take 1 tablet by mouth daily 30 tablet 3    clonazePAM (KLONOPIN) 0.5 MG tablet TAKE ONE TABLET BY MOUTH TWICE A DAY AS NEEDED FOR ANXIETY 60 tablet 0    omeprazole (PRILOSEC) 20 MG delayed release capsule TAKE ONE CAPSULE BY MOUTH DAILY 90 capsule 3    risperiDONE (RISPERDAL) 2 MG tablet Take 1 tablet by mouth daily 90 tablet 3    lisinopril (PRINIVIL;ZESTRIL) 20 MG tablet TAKE ONE TABLET BY MOUTH DAILY 90 tablet 0     No current facility-administered medications for this visit.       No Known Allergies      HPI:     HPI    Presents today c/o follow-up    Was started on Zoloft last visit for anxiety  She continues on Risperdal and Klonopin PRN  Has upcoming appointment with Giles Phillips   No side effects with Zoloft  Declines any improvement in depression / anxiety sx   Hasn't scheduled f/u with Neuro yet, awaiting psych appointment     H/o HTN - lisinopril  Declines any medication side effects  Doesn't monitor blood pressure at home   Declines related chest pain, cough, dyspnea, dizziness or headaches        H/o anemia - recent hgb 11.1  Taking oral B12, dose unknown   Not taking iron supplement  Declines any occult bleeding     Health Maintenance:      Subjective:     Review of Systems   Constitutional: Negative. Negative for activity change, chills, diaphoresis and fever. HENT: Negative. Eyes: Negative. Respiratory: Negative. Negative for cough and shortness of breath. Cardiovascular: Negative. Gastrointestinal: Positive for constipation. Negative for anal bleeding, blood in stool, diarrhea, nausea and vomiting. Endocrine: Negative. Genitourinary: Negative. Negative for difficulty urinating and hematuria. Musculoskeletal: Negative. Skin: Negative. Negative for color change, pallor, rash and wound. Allergic/Immunologic: Negative. Neurological: Positive for tremors. Negative for seizures, syncope, facial asymmetry, speech difficulty, light-headedness and headaches. Hematological: Negative. Psychiatric/Behavioral: Positive for dysphoric mood. Negative for sleep disturbance. The patient is nervous/anxious. Objective:     Vitals:    12/18/20 1021   BP: 132/84   Pulse: 72   Temp: 97.2 °F (36.2 °C)   SpO2: 98%       Body mass index is 22.44 kg/m². Physical Exam  Constitutional:       General: She is not in acute distress. Appearance: Normal appearance. She is well-developed and normal weight. She is not ill-appearing, toxic-appearing or diaphoretic. HENT:      Head: Normocephalic and atraumatic. Right Ear: External ear normal.      Left Ear: External ear normal.      Nose: Nose normal.   Eyes:      General: No scleral icterus. Right eye: No discharge. Left eye: No discharge. Conjunctiva/sclera: Conjunctivae normal.      Pupils: Pupils are equal, round, and reactive to light. Neck:      Musculoskeletal: Normal range of motion and neck supple. Trachea: No tracheal deviation. Cardiovascular:      Rate and Rhythm: Normal rate and regular rhythm. Heart sounds: Normal heart sounds. No murmur. No friction rub. No gallop. Pulmonary:      Effort: Pulmonary effort is normal. No tachypnea, accessory muscle usage or respiratory distress. Breath sounds: Normal breath sounds. No stridor. No decreased breath sounds, wheezing, rhonchi or rales. Abdominal:      Palpations: Abdomen is soft. Musculoskeletal: Normal range of motion. General: No tenderness or deformity. Skin:     General: Skin is warm and dry. Coloration: Skin is not pale. Findings: No erythema or rash. Neurological:      Mental Status: She is alert and oriented to person, place, and time. GCS: GCS eye subscore is 4. GCS verbal subscore is 5. GCS motor subscore is 6. Gait: Gait is intact. Comments: Frequent involuntary movements of bilateral lower extremities     Psychiatric:         Speech: Speech normal.         Behavior: Behavior normal.         Thought Content: Thought content normal.         Judgment: Judgment normal.           Assessment:         1. Anemia, unspecified type    2. Anxiety and depression    3. Essential hypertension        Plan:     1. Anemia, unspecified type    - CBC Auto Differential; Future  - Iron and TIBC; Future  - Vitamin B12 & Folate; Future    Recheck labs  Plan to supplement iron if anemia continues  Declines any further work-up at this time     2. Anxiety and depression    Poorly controlled  Upcoming appointment with [facility]psych for medication management    3. Essential hypertension    BP controlled  Labs up to date and reviewed     Discussed use, benefit, and side effects of prescribed medications. All patient questions answered. Pt voiced understanding. Reviewed health maintenance. Instructed to continue current medications, diet and exercise. Patient agreedwith treatment plan. Follow up as directed.      Electronically signed by PABLITO Luke CNP on 12/18/2020

## 2021-01-13 DIAGNOSIS — K21.9 GASTROESOPHAGEAL REFLUX DISEASE WITHOUT ESOPHAGITIS: ICD-10-CM

## 2021-01-13 DIAGNOSIS — I10 ESSENTIAL HYPERTENSION: ICD-10-CM

## 2021-01-13 RX ORDER — OMEPRAZOLE 20 MG/1
20 CAPSULE, DELAYED RELEASE ORAL DAILY
Qty: 90 CAPSULE | Refills: 3 | Status: SHIPPED | OUTPATIENT
Start: 2021-01-13 | End: 2021-12-29

## 2021-01-13 RX ORDER — LISINOPRIL 20 MG/1
20 TABLET ORAL DAILY
Qty: 90 TABLET | Refills: 3 | Status: SHIPPED | OUTPATIENT
Start: 2021-01-13 | End: 2022-01-19

## 2021-02-05 ENCOUNTER — OFFICE VISIT (OUTPATIENT)
Dept: FAMILY MEDICINE CLINIC | Age: 81
End: 2021-02-05
Payer: MEDICARE

## 2021-02-05 VITALS
TEMPERATURE: 97.1 F | HEART RATE: 97 BPM | SYSTOLIC BLOOD PRESSURE: 112 MMHG | OXYGEN SATURATION: 97 % | BODY MASS INDEX: 22.4 KG/M2 | WEIGHT: 112.8 LBS | DIASTOLIC BLOOD PRESSURE: 64 MMHG

## 2021-02-05 DIAGNOSIS — R39.15 URINARY URGENCY: ICD-10-CM

## 2021-02-05 DIAGNOSIS — N39.0 URINARY TRACT INFECTION WITHOUT HEMATURIA, SITE UNSPECIFIED: Primary | ICD-10-CM

## 2021-02-05 DIAGNOSIS — K59.00 CONSTIPATION, UNSPECIFIED CONSTIPATION TYPE: ICD-10-CM

## 2021-02-05 DIAGNOSIS — G20 PARKINSON'S DISEASE (HCC): ICD-10-CM

## 2021-02-05 DIAGNOSIS — Z87.898 H/O URINARY RETENTION: ICD-10-CM

## 2021-02-05 PROBLEM — G20.A1 PARKINSON'S DISEASE: Status: RESOLVED | Noted: 2021-02-05 | Resolved: 2021-02-05

## 2021-02-05 PROBLEM — K55.9 COLITIS, ISCHEMIC (HCC): Status: RESOLVED | Noted: 2017-01-30 | Resolved: 2021-02-05

## 2021-02-05 PROBLEM — G20.A1 PARKINSON'S DISEASE: Status: ACTIVE | Noted: 2021-02-05

## 2021-02-05 PROCEDURE — G8484 FLU IMMUNIZE NO ADMIN: HCPCS | Performed by: NURSE PRACTITIONER

## 2021-02-05 PROCEDURE — 1090F PRES/ABSN URINE INCON ASSESS: CPT | Performed by: NURSE PRACTITIONER

## 2021-02-05 PROCEDURE — G8427 DOCREV CUR MEDS BY ELIG CLIN: HCPCS | Performed by: NURSE PRACTITIONER

## 2021-02-05 PROCEDURE — 1036F TOBACCO NON-USER: CPT | Performed by: NURSE PRACTITIONER

## 2021-02-05 PROCEDURE — 1123F ACP DISCUSS/DSCN MKR DOCD: CPT | Performed by: NURSE PRACTITIONER

## 2021-02-05 PROCEDURE — G8399 PT W/DXA RESULTS DOCUMENT: HCPCS | Performed by: NURSE PRACTITIONER

## 2021-02-05 PROCEDURE — 4040F PNEUMOC VAC/ADMIN/RCVD: CPT | Performed by: NURSE PRACTITIONER

## 2021-02-05 PROCEDURE — 99214 OFFICE O/P EST MOD 30 MIN: CPT | Performed by: NURSE PRACTITIONER

## 2021-02-05 PROCEDURE — G8420 CALC BMI NORM PARAMETERS: HCPCS | Performed by: NURSE PRACTITIONER

## 2021-02-05 RX ORDER — SULFAMETHOXAZOLE AND TRIMETHOPRIM 800; 160 MG/1; MG/1
1 TABLET ORAL 2 TIMES DAILY
Qty: 14 TABLET | Refills: 0 | Status: CANCELLED | OUTPATIENT
Start: 2021-02-05 | End: 2021-02-12

## 2021-02-05 RX ORDER — SULFAMETHOXAZOLE AND TRIMETHOPRIM 800; 160 MG/1; MG/1
1 TABLET ORAL 2 TIMES DAILY
Qty: 14 TABLET | Refills: 0 | Status: SHIPPED | OUTPATIENT
Start: 2021-02-05 | End: 2021-02-12

## 2021-02-05 RX ORDER — VENLAFAXINE HYDROCHLORIDE 37.5 MG/1
37.5 CAPSULE, EXTENDED RELEASE ORAL 2 TIMES DAILY
Qty: 1 CAPSULE | Refills: 0 | COMMUNITY
Start: 2021-02-05 | End: 2021-04-26

## 2021-02-05 ASSESSMENT — ENCOUNTER SYMPTOMS
DIARRHEA: 0
RESPIRATORY NEGATIVE: 1
EYES NEGATIVE: 1
ABDOMINAL PAIN: 1
VOMITING: 0
CONSTIPATION: 1
ALLERGIC/IMMUNOLOGIC NEGATIVE: 1
NAUSEA: 1
COLOR CHANGE: 0

## 2021-02-05 ASSESSMENT — PATIENT HEALTH QUESTIONNAIRE - PHQ9
SUM OF ALL RESPONSES TO PHQ QUESTIONS 1-9: 0
SUM OF ALL RESPONSES TO PHQ9 QUESTIONS 1 & 2: 0
2. FEELING DOWN, DEPRESSED OR HOPELESS: 0
SUM OF ALL RESPONSES TO PHQ QUESTIONS 1-9: 0

## 2021-02-05 NOTE — PROGRESS NOTES
[facility]  [address] [facility]  Dept: [address] is a 79 y.o. female who presents today for her medical conditions/complaintsas noted below. Ra Carson is here today c/o Abdominal Pain (for the past few days) and Urinary Frequency    Past Medical History:   Diagnosis Date    Anxiety and depression     Colitis     w/ inpatient colonscopy polyp removal    GERD (gastroesophageal reflux disease)     HTN (hypertension) 2014    Restless legs 2020      Past Surgical History:   Procedure Laterality Date    APPENDECTOMY      HYSTERECTOMY         Family History   Problem Relation Age of Onset    Heart Disease Father        Social History     Tobacco Use    Smoking status: Former Smoker     Packs/day: 0.25     Years: 30.00     Pack years: 7.50     Types: Cigarettes     Quit date: 1980     Years since quittin.0    Smokeless tobacco: Never Used   Substance Use Topics    Alcohol use: Yes     Comment: socially       Current Outpatient Medications   Medication Sig Dispense Refill    venlafaxine (EFFEXOR XR) 37.5 MG extended release capsule Take 1 capsule by mouth 2 times daily 1 capsule 0    omeprazole (PRILOSEC) 20 MG delayed release capsule Take 1 capsule by mouth daily 90 capsule 3    lisinopril (PRINIVIL;ZESTRIL) 20 MG tablet Take 1 tablet by mouth daily 90 tablet 3    ferrous sulfate (IRON 325) 325 (65 Fe) MG tablet Take 1 tablet by mouth 2 times daily 60 tablet 3    docusate sodium (COLACE) 100 MG capsule Take 1 capsule by mouth 2 times daily as needed for Constipation 60 capsule 3    clonazePAM (KLONOPIN) 0.5 MG tablet TAKE ONE TABLET BY MOUTH TWICE A DAY AS NEEDED FOR ANXIETY 60 tablet 0     No current facility-administered medications for this visit. No Known Allergies      HPI:     Urinary Frequency   This is a new problem. The current episode started in the past 7 days (1 week).  The problem has been unchanged. The pain is at a severity of 3/10. The pain is mild. There has been no fever. The fever has been present for less than 1 day. There is no history of pyelonephritis. Associated symptoms include frequency, hesitancy, nausea and urgency. Pertinent negatives include no chills, discharge, flank pain, hematuria, possible pregnancy, sweats or vomiting. She has tried nothing for the symptoms. Her past medical history is significant for catheterization, recurrent UTIs and urinary stasis. There is no history of kidney stones, a single kidney or a urological procedure. Health Maintenance:    Subjective:     Review of Systems   Constitutional: Negative. Negative for appetite change, chills, diaphoresis and fever. HENT: Negative. Eyes: Negative. Respiratory: Negative. Cardiovascular: Negative. Gastrointestinal: Positive for abdominal pain (suprapubic), constipation and nausea. Negative for diarrhea and vomiting. Endocrine: Negative. Genitourinary: Positive for difficulty urinating, frequency, hesitancy and urgency. Negative for dysuria, flank pain and hematuria.        + urinary odor    Musculoskeletal: Negative. Negative for myalgias. Skin: Negative. Negative for color change, pallor, rash and wound. Allergic/Immunologic: Negative. Neurological: Negative. Negative for dizziness and light-headedness. Hematological: Negative. Psychiatric/Behavioral: Negative. Objective:     Vitals:    02/05/21 1533   BP: 112/64   Pulse: 97   Temp: 97.1 °F (36.2 °C)   SpO2: 97%       Body mass index is 22.4 kg/m². Physical Exam  Constitutional:       General: She is not in acute distress. Appearance: Normal appearance. She is well-developed and normal weight. She is not ill-appearing, toxic-appearing or diaphoretic. HENT:      Head: Normocephalic and atraumatic.       Right Ear: External ear normal.      Left Ear: External ear normal.      Nose: Nose normal.   Eyes:      General: No scleral icterus. Right eye: No discharge. Left eye: No discharge. Conjunctiva/sclera: Conjunctivae normal.      Pupils: Pupils are equal, round, and reactive to light. Neck:      Musculoskeletal: Normal range of motion and neck supple. Trachea: No tracheal deviation. Cardiovascular:      Rate and Rhythm: Normal rate and regular rhythm. Heart sounds: Normal heart sounds. No murmur. No friction rub. No gallop. Pulmonary:      Effort: Pulmonary effort is normal. No tachypnea, accessory muscle usage or respiratory distress. Breath sounds: Normal breath sounds. No stridor. No decreased breath sounds, wheezing, rhonchi or rales. Abdominal:      General: Abdomen is flat. Bowel sounds are normal. There is no distension. Palpations: Abdomen is soft. Tenderness: There is no abdominal tenderness. There is no right CVA tenderness, left CVA tenderness or guarding. Musculoskeletal: Normal range of motion. General: No tenderness or deformity. Skin:     General: Skin is warm and dry. Coloration: Skin is not pale. Findings: No erythema or rash. Neurological:      Mental Status: She is alert and oriented to person, place, and time. GCS: GCS eye subscore is 4. GCS verbal subscore is 5. GCS motor subscore is 6. Gait: Gait is intact. Gait normal.   Psychiatric:         Speech: Speech normal.         Behavior: Behavior normal.         Thought Content: Thought content normal.         Judgment: Judgment normal.           Assessment:         1. Urinary tract infection without hematuria, site unspecified    2. Urinary urgency    3. H/O urinary retention    4. Parkinson's disease ([facility] [address]    5. Constipation, unspecified constipation type        Plan:     1. Urinary tract infection without hematuria, site unspecified    - Culture, Urine; Future  - Urinalysis With Microscopic;  Future  - sulfamethoxazole-trimethoprim (BACTRIM DS;SEPTRA DS) 800-160 MG per tablet; Take 1 tablet by mouth 2 times daily for 7 days  Dispense: 14 tablet; Refill: 0    Check urine , unable to go in office today  Don't start Bactrim until sample obtained  Given history recommended Urology follow-up  PUSH fluids  Follow-up if worsened or fails to improve     2. Urinary urgency    - Culture, Urine; Future  - Urinalysis With Microscopic; Future  - sulfamethoxazole-trimethoprim (BACTRIM DS;SEPTRA DS) 800-160 MG per tablet; Take 1 tablet by mouth 2 times daily for 7 days  Dispense: 14 tablet; Refill: 0    3. H/O urinary retention    Urology follow-up  Declines referral     4. Parkinson's disease ([facility] [address]    [facility] [address] gap closure     5. Constipation    Recommended fluids, fiber, activity  OTC Miralax as needed  Prevention of constipation may help recurrent UTI's     Discussed use, benefit, and side effects of prescribed medications. All patient questions answered. Pt voiced understanding. Reviewed health maintenance. Instructed to continue current medications, diet and exercise. Patient agreedwith treatment plan. Follow up as directed.      Electronically signed by PABLITO Perez CNP on 2/5/2021

## 2021-02-08 ENCOUNTER — HOSPITAL ENCOUNTER (OUTPATIENT)
Age: 81
Setting detail: SPECIMEN
Discharge: HOME | End: 2021-02-08
Payer: MEDICARE

## 2021-02-08 DIAGNOSIS — N39.0 URINARY TRACT INFECTION WITHOUT HEMATURIA, SITE UNSPECIFIED: ICD-10-CM

## 2021-02-08 DIAGNOSIS — R39.15 URINARY URGENCY: ICD-10-CM

## 2021-02-08 LAB
-: ABNORMAL
AMORPHOUS: ABNORMAL
BACTERIA: ABNORMAL
BILIRUBIN URINE: NEGATIVE
CASTS UA: ABNORMAL /LPF (ref 0–8)
COLOR: YELLOW
CRYSTALS, UA: ABNORMAL /HPF
EPITHELIAL CELLS UA: ABNORMAL /HPF (ref 0–5)
GLUCOSE URINE: NEGATIVE
KETONES, URINE: NEGATIVE
LEUKOCYTE ESTERASE, URINE: ABNORMAL
MUCUS: ABNORMAL
NITRITE, URINE: NEGATIVE
OTHER OBSERVATIONS UA: ABNORMAL
PH UA: 8 (ref 5–8)
PROTEIN UA: NEGATIVE
RBC UA: ABNORMAL /HPF (ref 0–4)
RENAL EPITHELIAL, UA: ABNORMAL /HPF
SPECIFIC GRAVITY UA: 1.01 (ref 1–1.03)
TRICHOMONAS: ABNORMAL
TURBIDITY: CLEAR
URINE HGB: NEGATIVE
UROBILINOGEN, URINE: NORMAL
WBC UA: ABNORMAL /HPF (ref 0–5)
YEAST: ABNORMAL

## 2021-02-09 LAB
CULTURE: NO GROWTH
Lab: NORMAL
SPECIMEN DESCRIPTION: NORMAL

## 2021-04-13 ENCOUNTER — TELEPHONE (OUTPATIENT)
Dept: FAMILY MEDICINE CLINIC | Age: 81
End: 2021-04-13

## 2021-04-13 DIAGNOSIS — R11.0 NAUSEA: Primary | ICD-10-CM

## 2021-04-13 RX ORDER — ONDANSETRON 4 MG/1
4 TABLET, FILM COATED ORAL DAILY PRN
Qty: 30 TABLET | Refills: 0 | Status: SHIPPED | OUTPATIENT
Start: 2021-04-13 | End: 2022-09-30

## 2021-04-20 DIAGNOSIS — D64.9 ANEMIA, UNSPECIFIED TYPE: ICD-10-CM

## 2021-04-20 RX ORDER — FERROUS SULFATE 325(65) MG
TABLET ORAL
Qty: 60 TABLET | Refills: 5 | Status: SHIPPED | OUTPATIENT
Start: 2021-04-20 | End: 2021-11-10

## 2021-04-20 NOTE — TELEPHONE ENCOUNTER
[facility] is calling to request a refill on the following medication(s):    Medication Request:  Requested Prescriptions     Pending Prescriptions Disp Refills    FEROSUL 325 (65 Fe) MG tablet [Pharmacy Med Name: FEROSUL 325 MG TABLET] 60 tablet 1     Sig: TAKE ONE TABLET BY MOUTH TWICE A DAY       Last Visit Date (If Applicable):  5/0/3513    Next Visit Date:    6/21/2021

## 2021-04-26 RX ORDER — VENLAFAXINE HYDROCHLORIDE 37.5 MG/1
CAPSULE, EXTENDED RELEASE ORAL
Qty: 180 CAPSULE | Refills: 0 | Status: SHIPPED | OUTPATIENT
Start: 2021-04-26 | End: 2022-04-15 | Stop reason: SDUPTHER

## 2021-07-22 RX ORDER — VENLAFAXINE HYDROCHLORIDE 37.5 MG/1
CAPSULE, EXTENDED RELEASE ORAL
Qty: 180 CAPSULE | Refills: 0 | OUTPATIENT
Start: 2021-07-22

## 2021-07-22 NOTE — TELEPHONE ENCOUNTER
[facility] is calling to request a refill on the following medication(s):    Medication Request:  Requested Prescriptions      No prescriptions requested or ordered in this encounter       Last Visit Date (If Applicable):  4/4/3773    Next Visit Date:    8/2/2021

## 2021-08-02 ENCOUNTER — OFFICE VISIT (OUTPATIENT)
Dept: FAMILY MEDICINE CLINIC | Age: 81
End: 2021-08-02
Payer: MEDICARE

## 2021-08-02 VITALS
SYSTOLIC BLOOD PRESSURE: 124 MMHG | OXYGEN SATURATION: 99 % | DIASTOLIC BLOOD PRESSURE: 80 MMHG | HEART RATE: 103 BPM | BODY MASS INDEX: 22.32 KG/M2 | WEIGHT: 112.4 LBS | TEMPERATURE: 97.3 F

## 2021-08-02 DIAGNOSIS — F41.9 ANXIETY AND DEPRESSION: ICD-10-CM

## 2021-08-02 DIAGNOSIS — R73.09 ELEVATED GLUCOSE: ICD-10-CM

## 2021-08-02 DIAGNOSIS — I10 ESSENTIAL HYPERTENSION: Primary | ICD-10-CM

## 2021-08-02 DIAGNOSIS — D64.9 ANEMIA, UNSPECIFIED TYPE: ICD-10-CM

## 2021-08-02 DIAGNOSIS — F32.A ANXIETY AND DEPRESSION: ICD-10-CM

## 2021-08-02 PROCEDURE — 1036F TOBACCO NON-USER: CPT | Performed by: NURSE PRACTITIONER

## 2021-08-02 PROCEDURE — 1123F ACP DISCUSS/DSCN MKR DOCD: CPT | Performed by: NURSE PRACTITIONER

## 2021-08-02 PROCEDURE — G8399 PT W/DXA RESULTS DOCUMENT: HCPCS | Performed by: NURSE PRACTITIONER

## 2021-08-02 PROCEDURE — G8420 CALC BMI NORM PARAMETERS: HCPCS | Performed by: NURSE PRACTITIONER

## 2021-08-02 PROCEDURE — 1090F PRES/ABSN URINE INCON ASSESS: CPT | Performed by: NURSE PRACTITIONER

## 2021-08-02 PROCEDURE — 99214 OFFICE O/P EST MOD 30 MIN: CPT | Performed by: NURSE PRACTITIONER

## 2021-08-02 PROCEDURE — 4040F PNEUMOC VAC/ADMIN/RCVD: CPT | Performed by: NURSE PRACTITIONER

## 2021-08-02 PROCEDURE — G8427 DOCREV CUR MEDS BY ELIG CLIN: HCPCS | Performed by: NURSE PRACTITIONER

## 2021-08-02 RX ORDER — BUSPIRONE HYDROCHLORIDE 5 MG/1
5 TABLET ORAL 2 TIMES DAILY
Qty: 60 TABLET | Refills: 0 | COMMUNITY
Start: 2021-08-02 | End: 2022-03-10

## 2021-08-02 RX ORDER — VENLAFAXINE HYDROCHLORIDE 37.5 MG/1
CAPSULE, EXTENDED RELEASE ORAL
Qty: 180 CAPSULE | Refills: 0 | OUTPATIENT
Start: 2021-08-02

## 2021-08-02 ASSESSMENT — ENCOUNTER SYMPTOMS
EYES NEGATIVE: 1
ALLERGIC/IMMUNOLOGIC NEGATIVE: 1
RESPIRATORY NEGATIVE: 1
DIARRHEA: 0
CHEST TIGHTNESS: 0
COUGH: 0
GASTROINTESTINAL NEGATIVE: 1
COLOR CHANGE: 0
NAUSEA: 0
WHEEZING: 0
SHORTNESS OF BREATH: 0
VOMITING: 0

## 2021-08-02 ASSESSMENT — PATIENT HEALTH QUESTIONNAIRE - PHQ9
SUM OF ALL RESPONSES TO PHQ9 QUESTIONS 1 & 2: 0
SUM OF ALL RESPONSES TO PHQ QUESTIONS 1-9: 0
2. FEELING DOWN, DEPRESSED OR HOPELESS: 0
1. LITTLE INTEREST OR PLEASURE IN DOING THINGS: 0

## 2021-08-02 NOTE — PROGRESS NOTES
[facility]  [address] [facility]  Dept: [address] is a [de-identified] y.o. female who presents today for her medical conditions/complaintsas noted below. Oli Perkins is here today c/o Hypertension    Past Medical History:   Diagnosis Date    Anxiety and depression     Colitis     w/ inpatient colonscopy polyp removal    GERD (gastroesophageal reflux disease)     HTN (hypertension) 2014    Restless legs 2020      Past Surgical History:   Procedure Laterality Date    APPENDECTOMY      HYSTERECTOMY         Family History   Problem Relation Age of Onset    Heart Disease Father        Social History     Tobacco Use    Smoking status: Former Smoker     Packs/day: 0.25     Years: 30.00     Pack years: 7.50     Types: Cigarettes     Quit date: 1980     Years since quittin.5    Smokeless tobacco: Never Used   Substance Use Topics    Alcohol use: Yes     Comment: socially       Current Outpatient Medications   Medication Sig Dispense Refill    ASHWAGANDHA PO Take 300 mg by mouth 2 times daily       busPIRone (BUSPAR) 5 MG tablet Take 1 tablet by mouth 2 times daily 60 tablet 0    venlafaxine (EFFEXOR XR) 37.5 MG extended release capsule TAKE ONE CAPSULE BY MOUTH TWICE A  capsule 0    FEROSUL 325 (65 Fe) MG tablet TAKE ONE TABLET BY MOUTH TWICE A DAY 60 tablet 5    ondansetron (ZOFRAN) 4 MG tablet Take 1 tablet by mouth daily as needed for Nausea or Vomiting 30 tablet 0    omeprazole (PRILOSEC) 20 MG delayed release capsule Take 1 capsule by mouth daily 90 capsule 3    lisinopril (PRINIVIL;ZESTRIL) 20 MG tablet Take 1 tablet by mouth daily 90 tablet 3    clonazePAM (KLONOPIN) 0.5 MG tablet TAKE ONE TABLET BY MOUTH TWICE A DAY AS NEEDED FOR ANXIETY 60 tablet 0     No current facility-administered medications for this visit.      No Known Allergies      HPI:     HPI    Presents today c/o HTN follow-up    H/o HTN - lisinopril  Tolerating medication without side effects   Doesn't monitor blood pressure at home   Declines related sx as noted in ROS  Weight is stable     H/o anxiety / depression (Klonopin, Effexor, Buspar)   Follows w/ Adrien Mcgovern   H/o tardive dyskinesia secondary to Risperdal  Patient declined Neurology follow-up in the past     H/o GERD - omeprazole     H/o anemia - oral iron BID   Tolerating med     Health Maintenance:      Subjective:     Review of Systems   Constitutional: Positive for appetite change. Negative for chills, diaphoresis, fatigue, fever and unexpected weight change. HENT: Negative. Eyes: Negative. Respiratory: Negative. Negative for cough, chest tightness, shortness of breath and wheezing. Cardiovascular: Negative. Gastrointestinal: Negative. Negative for diarrhea, nausea and vomiting. Endocrine: Negative. Genitourinary: Negative. Negative for difficulty urinating and dysuria. Musculoskeletal: Negative. Skin: Negative. Negative for color change, pallor, rash and wound. Allergic/Immunologic: Negative. Neurological: Negative. Negative for dizziness, seizures, syncope, light-headedness and headaches. Hematological: Negative. Psychiatric/Behavioral: Negative. Objective:     Vitals:    08/02/21 1007   BP: 124/80   Pulse: 103   Temp: 97.3 °F (36.3 °C)   SpO2: 99%       Body mass index is 22.32 kg/m². Physical Exam  Constitutional:       General: She is not in acute distress. Appearance: Normal appearance. She is well-developed and normal weight. She is not ill-appearing, toxic-appearing or diaphoretic. HENT:      Head: Normocephalic and atraumatic. Right Ear: External ear normal.      Left Ear: External ear normal.      Nose: Nose normal.   Eyes:      General: No scleral icterus. Right eye: No discharge. Left eye: No discharge.       Conjunctiva/sclera: Conjunctivae normal.      Pupils: Pupils are equal, round, and reactive to light. Neck:      Trachea: No tracheal deviation. Cardiovascular:      Rate and Rhythm: Normal rate and regular rhythm. Heart sounds: Normal heart sounds. No murmur heard. No friction rub. No gallop. Pulmonary:      Effort: Pulmonary effort is normal. No tachypnea, accessory muscle usage or respiratory distress. Breath sounds: Normal breath sounds. No stridor. No decreased breath sounds, wheezing, rhonchi or rales. Abdominal:      Palpations: Abdomen is soft. Musculoskeletal:         General: No tenderness or deformity. Normal range of motion. Cervical back: Normal range of motion and neck supple. Skin:     General: Skin is warm and dry. Coloration: Skin is not pale. Findings: No erythema or rash. Neurological:      Mental Status: She is alert and oriented to person, place, and time. GCS: GCS eye subscore is 4. GCS verbal subscore is 5. GCS motor subscore is 6. Gait: Gait normal.      Comments: Freq. Involuntary movements of bilateral lower extremities    Psychiatric:         Speech: Speech normal.         Behavior: Behavior normal.         Thought Content: Thought content normal.         Judgment: Judgment normal.           Assessment:         1. Essential hypertension    2. Anemia, unspecified type    3. Elevated glucose    4. Anxiety and depression        Plan:     1. Essential hypertension    - CBC Auto Differential; Future  - Comprehensive Metabolic Panel; Future    BP controlled, continue med  Update labs when due   Lifestyle modifications     2. Anemia, unspecified type    - CBC Auto Differential; Future  - Iron and TIBC; Future  - Vitamin B12 & Folate; Future    Continue oral iron, update labs when due     3. Elevated glucose    - Hemoglobin A1C; Future    4. Anxiety and depression    Follows / [facility] psych     Discussed use, benefit, and side effects of prescribed medications. All patient questions answered. Pt voiced understanding.  Reviewed health maintenance. Instructed to continue current medications, diet and exercise. Patient agreedwith treatment plan. Follow up as directed.      Electronically signed by PABLITO Montoya CNP on 8/2/2021

## 2021-08-02 NOTE — TELEPHONE ENCOUNTER
Anusha Akers is calling to request a refill on the following medication(s):    Medication Request:  Requested Prescriptions     Pending Prescriptions Disp Refills    venlafaxine (EFFEXOR XR) 37.5 MG extended release capsule [Pharmacy Med Name: VENLAFAXINE HCL ER 37.5 MG CAP] 180 capsule 0     Sig: TAKE ONE CAPSULE BY MOUTH TWICE A DAY       Last Visit Date (If Applicable):  Visit date not found    Next Visit Date:    Visit date not found

## 2021-08-02 NOTE — TELEPHONE ENCOUNTER
Stop sending this refill, patient sees Psychiatry & medication comes from their office, this has already been addressed multiple times

## 2021-11-10 DIAGNOSIS — D64.9 ANEMIA, UNSPECIFIED TYPE: ICD-10-CM

## 2021-11-10 RX ORDER — FERROUS SULFATE 325(65) MG
TABLET ORAL
Qty: 60 TABLET | Refills: 5 | Status: SHIPPED | OUTPATIENT
Start: 2021-11-10 | End: 2022-02-22 | Stop reason: ALTCHOICE

## 2021-11-10 NOTE — TELEPHONE ENCOUNTER
Snow Olivier is calling to request a refill on the following medication(s):    Medication Request:  Requested Prescriptions     Pending Prescriptions Disp Refills    FEROSUL 325 (65 Fe) MG tablet [Pharmacy Med Name: FEROSUL 325 MG TABLET] 60 tablet 5     Sig: TAKE ONE TABLET BY MOUTH TWICE A DAY       Last Visit Date (If Applicable):  8/3/9022    Next Visit Date:    2/4/2022

## 2021-12-29 DIAGNOSIS — K21.9 GASTROESOPHAGEAL REFLUX DISEASE WITHOUT ESOPHAGITIS: ICD-10-CM

## 2021-12-29 RX ORDER — OMEPRAZOLE 20 MG/1
CAPSULE, DELAYED RELEASE ORAL
Qty: 90 CAPSULE | Refills: 3 | Status: SHIPPED | OUTPATIENT
Start: 2021-12-29 | End: 2022-07-05

## 2022-01-18 DIAGNOSIS — I10 ESSENTIAL HYPERTENSION: ICD-10-CM

## 2022-01-20 RX ORDER — LISINOPRIL 20 MG/1
TABLET ORAL
Qty: 90 TABLET | Refills: 1 | Status: SHIPPED | OUTPATIENT
Start: 2022-01-20 | End: 2022-04-15 | Stop reason: SDUPTHER

## 2022-02-14 LAB
ALBUMIN SERPL-MCNC: 4.2 G/DL
ALP BLD-CCNC: 95 U/L
ALT SERPL-CCNC: 56 U/L
ANION GAP SERPL CALCULATED.3IONS-SCNC: NORMAL MMOL/L
AST SERPL-CCNC: 78 U/L
AVERAGE GLUCOSE: 103
BASOPHILS ABSOLUTE: NORMAL
BASOPHILS RELATIVE PERCENT: 0.8 %
BILIRUB SERPL-MCNC: 0.9 MG/DL (ref 0.1–1.4)
BUN BLDV-MCNC: 10 MG/DL
CALCIUM SERPL-MCNC: 8.7 MG/DL
CHLORIDE BLD-SCNC: 107 MMOL/L
CO2: 26 MMOL/L
CREAT SERPL-MCNC: 0.77 MG/DL
EOSINOPHILS ABSOLUTE: NORMAL
EOSINOPHILS RELATIVE PERCENT: 0.58 %
FOLATE: >20
GFR CALCULATED: 72.1
GLUCOSE BLD-MCNC: 95 MG/DL
HBA1C MFR BLD: 5.2 %
HCT VFR BLD CALC: 37.8 % (ref 36–46)
HEMOGLOBIN: 13 G/DL (ref 12–16)
IRON: 206
LYMPHOCYTES ABSOLUTE: NORMAL
LYMPHOCYTES RELATIVE PERCENT: 1.86 %
MCH RBC QN AUTO: 34.3 PG
MCHC RBC AUTO-ENTMCNC: 34.4 G/DL
MCV RBC AUTO: 99.7 FL
MONOCYTES ABSOLUTE: NORMAL
MONOCYTES RELATIVE PERCENT: 0.53 %
NEUTROPHILS ABSOLUTE: NORMAL
NEUTROPHILS RELATIVE PERCENT: 2.21 %
PDW BLD-RTO: 43.8 %
PLATELET # BLD: 159 K/ΜL
PMV BLD AUTO: NORMAL FL
POTASSIUM SERPL-SCNC: 4 MMOL/L
RBC # BLD: 3.79 10^6/ΜL
SODIUM BLD-SCNC: 139 MMOL/L
TOTAL IRON BINDING CAPACITY: 209
TOTAL PROTEIN: 7.4
VITAMIN B-12: 788
WBC # BLD: 5.27 10^3/ML

## 2022-02-17 DIAGNOSIS — R73.09 ELEVATED GLUCOSE: ICD-10-CM

## 2022-02-17 DIAGNOSIS — R79.89 ELEVATED LFTS: ICD-10-CM

## 2022-02-17 DIAGNOSIS — I10 ESSENTIAL HYPERTENSION: ICD-10-CM

## 2022-02-17 DIAGNOSIS — D64.9 ANEMIA, UNSPECIFIED TYPE: ICD-10-CM

## 2022-02-17 DIAGNOSIS — D50.8 IRON DEFICIENCY ANEMIA SECONDARY TO INADEQUATE DIETARY IRON INTAKE: Primary | ICD-10-CM

## 2022-03-10 ENCOUNTER — OFFICE VISIT (OUTPATIENT)
Dept: FAMILY MEDICINE CLINIC | Age: 82
End: 2022-03-10
Payer: MEDICARE

## 2022-03-10 VITALS
WEIGHT: 125 LBS | OXYGEN SATURATION: 97 % | DIASTOLIC BLOOD PRESSURE: 82 MMHG | SYSTOLIC BLOOD PRESSURE: 132 MMHG | BODY MASS INDEX: 24.82 KG/M2 | HEART RATE: 105 BPM | TEMPERATURE: 97.1 F

## 2022-03-10 DIAGNOSIS — D50.8 IRON DEFICIENCY ANEMIA SECONDARY TO INADEQUATE DIETARY IRON INTAKE: ICD-10-CM

## 2022-03-10 DIAGNOSIS — F32.A ANXIETY AND DEPRESSION: ICD-10-CM

## 2022-03-10 DIAGNOSIS — I10 PRIMARY HYPERTENSION: Primary | ICD-10-CM

## 2022-03-10 DIAGNOSIS — F41.9 ANXIETY AND DEPRESSION: ICD-10-CM

## 2022-03-10 DIAGNOSIS — R79.89 ELEVATED LFTS: ICD-10-CM

## 2022-03-10 PROCEDURE — 1090F PRES/ABSN URINE INCON ASSESS: CPT | Performed by: NURSE PRACTITIONER

## 2022-03-10 PROCEDURE — 1036F TOBACCO NON-USER: CPT | Performed by: NURSE PRACTITIONER

## 2022-03-10 PROCEDURE — 4040F PNEUMOC VAC/ADMIN/RCVD: CPT | Performed by: NURSE PRACTITIONER

## 2022-03-10 PROCEDURE — G8420 CALC BMI NORM PARAMETERS: HCPCS | Performed by: NURSE PRACTITIONER

## 2022-03-10 PROCEDURE — G8399 PT W/DXA RESULTS DOCUMENT: HCPCS | Performed by: NURSE PRACTITIONER

## 2022-03-10 PROCEDURE — 1123F ACP DISCUSS/DSCN MKR DOCD: CPT | Performed by: NURSE PRACTITIONER

## 2022-03-10 PROCEDURE — G8484 FLU IMMUNIZE NO ADMIN: HCPCS | Performed by: NURSE PRACTITIONER

## 2022-03-10 PROCEDURE — 99214 OFFICE O/P EST MOD 30 MIN: CPT | Performed by: NURSE PRACTITIONER

## 2022-03-10 PROCEDURE — G8427 DOCREV CUR MEDS BY ELIG CLIN: HCPCS | Performed by: NURSE PRACTITIONER

## 2022-03-10 RX ORDER — MIRTAZAPINE 15 MG/1
TABLET, FILM COATED ORAL NIGHTLY
COMMUNITY
Start: 2022-03-06 | End: 2022-04-15 | Stop reason: SDUPTHER

## 2022-03-10 SDOH — ECONOMIC STABILITY: FOOD INSECURITY: WITHIN THE PAST 12 MONTHS, YOU WORRIED THAT YOUR FOOD WOULD RUN OUT BEFORE YOU GOT MONEY TO BUY MORE.: NEVER TRUE

## 2022-03-10 SDOH — ECONOMIC STABILITY: FOOD INSECURITY: WITHIN THE PAST 12 MONTHS, THE FOOD YOU BOUGHT JUST DIDN'T LAST AND YOU DIDN'T HAVE MONEY TO GET MORE.: NEVER TRUE

## 2022-03-10 ASSESSMENT — PATIENT HEALTH QUESTIONNAIRE - PHQ9
SUM OF ALL RESPONSES TO PHQ QUESTIONS 1-9: 0
2. FEELING DOWN, DEPRESSED OR HOPELESS: 0
4. FEELING TIRED OR HAVING LITTLE ENERGY: 0
7. TROUBLE CONCENTRATING ON THINGS, SUCH AS READING THE NEWSPAPER OR WATCHING TELEVISION: 0
SUM OF ALL RESPONSES TO PHQ QUESTIONS 1-9: 0
SUM OF ALL RESPONSES TO PHQ9 QUESTIONS 1 & 2: 0
SUM OF ALL RESPONSES TO PHQ QUESTIONS 1-9: 0
6. FEELING BAD ABOUT YOURSELF - OR THAT YOU ARE A FAILURE OR HAVE LET YOURSELF OR YOUR FAMILY DOWN: 0
SUM OF ALL RESPONSES TO PHQ QUESTIONS 1-9: 0
SUM OF ALL RESPONSES TO PHQ QUESTIONS 1-9: 0
1. LITTLE INTEREST OR PLEASURE IN DOING THINGS: 0
8. MOVING OR SPEAKING SO SLOWLY THAT OTHER PEOPLE COULD HAVE NOTICED. OR THE OPPOSITE, BEING SO FIGETY OR RESTLESS THAT YOU HAVE BEEN MOVING AROUND A LOT MORE THAN USUAL: 0
5. POOR APPETITE OR OVEREATING: 0
2. FEELING DOWN, DEPRESSED OR HOPELESS: 0
SUM OF ALL RESPONSES TO PHQ QUESTIONS 1-9: 0
10. IF YOU CHECKED OFF ANY PROBLEMS, HOW DIFFICULT HAVE THESE PROBLEMS MADE IT FOR YOU TO DO YOUR WORK, TAKE CARE OF THINGS AT HOME, OR GET ALONG WITH OTHER PEOPLE: 0
SUM OF ALL RESPONSES TO PHQ QUESTIONS 1-9: 0
SUM OF ALL RESPONSES TO PHQ QUESTIONS 1-9: 0
3. TROUBLE FALLING OR STAYING ASLEEP: 0
9. THOUGHTS THAT YOU WOULD BE BETTER OFF DEAD, OR OF HURTING YOURSELF: 0

## 2022-03-10 ASSESSMENT — ENCOUNTER SYMPTOMS
EYES NEGATIVE: 1
COLOR CHANGE: 0
COUGH: 0
NAUSEA: 0
WHEEZING: 0
GASTROINTESTINAL NEGATIVE: 1
ALLERGIC/IMMUNOLOGIC NEGATIVE: 1
DIARRHEA: 0
RESPIRATORY NEGATIVE: 1
VOMITING: 0
CHEST TIGHTNESS: 0

## 2022-03-10 ASSESSMENT — SOCIAL DETERMINANTS OF HEALTH (SDOH): HOW HARD IS IT FOR YOU TO PAY FOR THE VERY BASICS LIKE FOOD, HOUSING, MEDICAL CARE, AND HEATING?: NOT HARD AT ALL

## 2022-03-10 NOTE — PROGRESS NOTES
bleeding complaints     H/o elevated LFT's likely secondary to hemolysis   Declines excessive tylenol or alcohol use     Follows / [facility] psych for anxiety / depression  Doing well on Effexor & Remeron  Wondering if I will take over RX   She has tried multiple different medication combinations with most relief from above   PHQ 9 - 0     Health Maintenance:      Subjective:     Review of Systems   Constitutional: Negative. Negative for appetite change, chills, diaphoresis, fatigue, fever and unexpected weight change. HENT: Negative. Eyes: Negative. Respiratory: Negative. Negative for cough, chest tightness and wheezing. Cardiovascular: Negative. Negative for chest pain and leg swelling. Gastrointestinal: Negative. Negative for diarrhea, nausea and vomiting. Endocrine: Negative. Genitourinary: Negative. Negative for difficulty urinating. Musculoskeletal: Negative. Skin: Negative. Negative for color change, pallor, rash and wound. Allergic/Immunologic: Negative. Neurological: Negative. Negative for syncope, facial asymmetry, speech difficulty and numbness. Hematological: Negative. Psychiatric/Behavioral: Negative. Negative for dysphoric mood. The patient is not nervous/anxious. Objective:     Vitals:    03/10/22 1446   BP: 132/82   Pulse: 105   Temp: 97.1 °F (36.2 °C)   SpO2: 97%       Body mass index is 24.82 kg/m². Physical Exam  Constitutional:       General: She is not in acute distress. Appearance: She is well-developed. She is not ill-appearing, toxic-appearing or diaphoretic. HENT:      Head: Normocephalic and atraumatic. Right Ear: External ear normal.      Left Ear: External ear normal.      Nose: Nose normal.   Eyes:      General: No scleral icterus. Right eye: No discharge. Left eye: No discharge. Conjunctiva/sclera: Conjunctivae normal.      Pupils: Pupils are equal, round, and reactive to light.    Neck:      Trachea: No tracheal deviation. Cardiovascular:      Rate and Rhythm: Normal rate and regular rhythm. Heart sounds: Normal heart sounds. No murmur heard. No friction rub. No gallop. Pulmonary:      Effort: Pulmonary effort is normal. No tachypnea, accessory muscle usage or respiratory distress. Breath sounds: Normal breath sounds. No stridor. No decreased breath sounds, wheezing, rhonchi or rales. Abdominal:      Palpations: Abdomen is soft. Musculoskeletal:         General: No tenderness or deformity. Normal range of motion. Cervical back: Normal range of motion and neck supple. Skin:     General: Skin is warm and dry. Coloration: Skin is not pale. Findings: No erythema or rash. Neurological:      Mental Status: She is alert and oriented to person, place, and time. GCS: GCS eye subscore is 4. GCS verbal subscore is 5. GCS motor subscore is 6. Gait: Gait is intact. Gait normal.   Psychiatric:         Speech: Speech normal.         Behavior: Behavior normal.         Thought Content: Thought content normal.         Judgment: Judgment normal.           Assessment:         1. Primary hypertension    2. Iron deficiency anemia secondary to inadequate dietary iron intake    3. Elevated LFTs    4. Anxiety and depression        Plan:     1. Primary hypertension    BP controlled, continue med  Labs up to date and reviewed  Lifestyle modifications encouraged     2. Iron deficiency anemia secondary to inadequate dietary iron intake    - CBC with Auto Differential; Future  - Iron and TIBC; Future    RESOLVED   Stop PO iron  Re-check labs 8 weeks     3. Elevated LFTs    - Hepatic Function Panel; Future    Likely hemolyzed specimen  Re-check labs 8 weeks    4. Anxiety and depression    Doing well on medications  OK to take over RX when needed     Discussed use, benefit, and side effects of prescribed medications. All patient questions answered. Pt voiced understanding.  Reviewed health maintenance. Instructed to continue current medications, diet and exercise. Patient agreedwith treatment plan. Follow up as directed.      Electronically signed by PABLITO Puga CNP on 3/10/2022

## 2022-04-15 ENCOUNTER — TELEPHONE (OUTPATIENT)
Dept: FAMILY MEDICINE CLINIC | Age: 82
End: 2022-04-15

## 2022-04-15 DIAGNOSIS — I10 ESSENTIAL HYPERTENSION: ICD-10-CM

## 2022-04-15 RX ORDER — MIRTAZAPINE 15 MG/1
7.5 TABLET, FILM COATED ORAL NIGHTLY
Qty: 90 TABLET | Refills: 0 | Status: SHIPPED | OUTPATIENT
Start: 2022-04-15 | End: 2022-07-08 | Stop reason: SDUPTHER

## 2022-04-15 RX ORDER — LISINOPRIL 40 MG/1
40 TABLET ORAL DAILY
Qty: 90 TABLET | Refills: 1 | Status: SHIPPED | OUTPATIENT
Start: 2022-04-15 | End: 2022-07-19

## 2022-04-15 RX ORDER — VENLAFAXINE HYDROCHLORIDE 37.5 MG/1
37.5 CAPSULE, EXTENDED RELEASE ORAL 2 TIMES DAILY
Qty: 180 CAPSULE | Refills: 1 | Status: SHIPPED | OUTPATIENT
Start: 2022-04-15 | End: 2022-10-31

## 2022-04-15 NOTE — TELEPHONE ENCOUNTER
Patients BP was elevated at her psychiatry appointment & granddaughter has been having her monitor at home for the past few weeks. BP has been consistently running 140-160 / 80-90's. Lisinopril dose increased to 40 mg & patient instructed to follow-up in 1 month.

## 2022-04-19 LAB
A/G RATIO: NORMAL
ALBUMIN SERPL-MCNC: 3.8 G/DL
ALP BLD-CCNC: 101 U/L
ALT SERPL-CCNC: 54 U/L
AST SERPL-CCNC: 82 U/L
BASOPHILS ABSOLUTE: 0.05 /ΜL
BASOPHILS RELATIVE PERCENT: 1.1 %
BILIRUB SERPL-MCNC: 1.1 MG/DL (ref 0.1–1.4)
BILIRUBIN DIRECT: 0 MG/DL
BILIRUBIN, INDIRECT: 1.1
EOSINOPHILS ABSOLUTE: 0.51 /ΜL
EOSINOPHILS RELATIVE PERCENT: 11.7 %
GLOBULIN: NORMAL
HCT VFR BLD CALC: 34.6 % (ref 36–46)
HEMOGLOBIN: 12 G/DL (ref 12–16)
IRON: 186
LYMPHOCYTES ABSOLUTE: 1.43 /ΜL
LYMPHOCYTES RELATIVE PERCENT: 32.8 %
MCH RBC QN AUTO: 34.1 PG
MCHC RBC AUTO-ENTMCNC: 34.7 G/DL
MCV RBC AUTO: 98.3 FL
MONOCYTES ABSOLUTE: 0.45 /ΜL
MONOCYTES RELATIVE PERCENT: 10.3 %
NEUTROPHILS ABSOLUTE: 1.91 /ΜL
NEUTROPHILS RELATIVE PERCENT: ABNORMAL
PDW BLD-RTO: 44.6 %
PLATELET # BLD: 157 K/ΜL
PMV BLD AUTO: ABNORMAL FL
PROTEIN TOTAL: 7.4 G/DL
RBC # BLD: 3.52 10^6/ΜL
TOTAL IRON BINDING CAPACITY: 222
WBC # BLD: 4.36 10^3/ML

## 2022-04-22 ENCOUNTER — TELEPHONE (OUTPATIENT)
Dept: FAMILY MEDICINE CLINIC | Age: 82
End: 2022-04-22

## 2022-05-09 ENCOUNTER — TELEPHONE (OUTPATIENT)
Dept: FAMILY MEDICINE CLINIC | Age: 82
End: 2022-05-09

## 2022-05-09 DIAGNOSIS — D50.8 IRON DEFICIENCY ANEMIA SECONDARY TO INADEQUATE DIETARY IRON INTAKE: ICD-10-CM

## 2022-05-09 DIAGNOSIS — I10 PRIMARY HYPERTENSION: Primary | ICD-10-CM

## 2022-05-09 DIAGNOSIS — R79.89 ELEVATED LFTS: ICD-10-CM

## 2022-05-09 RX ORDER — AMLODIPINE BESYLATE 5 MG/1
5 TABLET ORAL DAILY
Qty: 30 TABLET | Refills: 1 | Status: SHIPPED | OUTPATIENT
Start: 2022-05-09 | End: 2022-07-05

## 2022-05-09 NOTE — TELEPHONE ENCOUNTER
Patient granddaughter called due to elevated BP readings. She has been taking her lisinopril 40 mg x 1 month. BP running 150-170's on average systolic. RX for amlodipine sent. She will f/u on Friday as scheduled. She will call her Psychiatrist to discuss Remeron & possible relation to LFT's.

## 2022-05-13 ENCOUNTER — OFFICE VISIT (OUTPATIENT)
Dept: FAMILY MEDICINE CLINIC | Age: 82
End: 2022-05-13
Payer: MEDICARE

## 2022-05-13 VITALS
OXYGEN SATURATION: 99 % | WEIGHT: 128.8 LBS | HEART RATE: 81 BPM | SYSTOLIC BLOOD PRESSURE: 140 MMHG | DIASTOLIC BLOOD PRESSURE: 80 MMHG | TEMPERATURE: 97.3 F | BODY MASS INDEX: 25.58 KG/M2

## 2022-05-13 DIAGNOSIS — R79.89 ELEVATED LFTS: ICD-10-CM

## 2022-05-13 DIAGNOSIS — M25.522 BILATERAL ELBOW JOINT PAIN: ICD-10-CM

## 2022-05-13 DIAGNOSIS — I10 PRIMARY HYPERTENSION: Primary | ICD-10-CM

## 2022-05-13 DIAGNOSIS — R53.83 OTHER FATIGUE: ICD-10-CM

## 2022-05-13 DIAGNOSIS — Z91.81 AT HIGH RISK FOR FALLS: ICD-10-CM

## 2022-05-13 DIAGNOSIS — M25.521 BILATERAL ELBOW JOINT PAIN: ICD-10-CM

## 2022-05-13 DIAGNOSIS — D50.8 IRON DEFICIENCY ANEMIA SECONDARY TO INADEQUATE DIETARY IRON INTAKE: ICD-10-CM

## 2022-05-13 PROCEDURE — 1036F TOBACCO NON-USER: CPT | Performed by: NURSE PRACTITIONER

## 2022-05-13 PROCEDURE — G8399 PT W/DXA RESULTS DOCUMENT: HCPCS | Performed by: NURSE PRACTITIONER

## 2022-05-13 PROCEDURE — 4040F PNEUMOC VAC/ADMIN/RCVD: CPT | Performed by: NURSE PRACTITIONER

## 2022-05-13 PROCEDURE — 1090F PRES/ABSN URINE INCON ASSESS: CPT | Performed by: NURSE PRACTITIONER

## 2022-05-13 PROCEDURE — G8427 DOCREV CUR MEDS BY ELIG CLIN: HCPCS | Performed by: NURSE PRACTITIONER

## 2022-05-13 PROCEDURE — 99214 OFFICE O/P EST MOD 30 MIN: CPT | Performed by: NURSE PRACTITIONER

## 2022-05-13 PROCEDURE — 1123F ACP DISCUSS/DSCN MKR DOCD: CPT | Performed by: NURSE PRACTITIONER

## 2022-05-13 PROCEDURE — G8417 CALC BMI ABV UP PARAM F/U: HCPCS | Performed by: NURSE PRACTITIONER

## 2022-05-13 RX ORDER — BIOTIN 10 MG
1 TABLET ORAL DAILY
Qty: 1 TABLET | Refills: 0 | COMMUNITY
Start: 2022-05-13

## 2022-05-13 ASSESSMENT — ENCOUNTER SYMPTOMS
SHORTNESS OF BREATH: 0
GASTROINTESTINAL NEGATIVE: 1
EYES NEGATIVE: 1
BLOOD IN STOOL: 0
ALLERGIC/IMMUNOLOGIC NEGATIVE: 1
ANAL BLEEDING: 0
NAUSEA: 0
COUGH: 0
WHEEZING: 0
RESPIRATORY NEGATIVE: 1
DIARRHEA: 0
COLOR CHANGE: 0
CHEST TIGHTNESS: 0
VOMITING: 0

## 2022-05-13 NOTE — PROGRESS NOTES
[facility]  [address] [facility]  Dept: [address] is a [de-identified] y.o. female who presents today for her medical conditions/complaintsas noted below. Elza Schilder is here today c/o Hypertension (elevated readings)    Past Medical History:   Diagnosis Date    Anxiety and depression     Colitis     w/ inpatient colonscopy polyp removal    GERD (gastroesophageal reflux disease)     HTN (hypertension) 2014    Restless legs 2020      Past Surgical History:   Procedure Laterality Date    APPENDECTOMY      HYSTERECTOMY         Family History   Problem Relation Age of Onset    Heart Disease Father        Social History     Tobacco Use    Smoking status: Former Smoker     Packs/day: 0.25     Years: 30.00     Pack years: 7.50     Types: Cigarettes     Quit date: 1980     Years since quittin.3    Smokeless tobacco: Never Used   Substance Use Topics    Alcohol use: Yes     Comment: socially       Current Outpatient Medications   Medication Sig Dispense Refill    amLODIPine (NORVASC) 5 MG tablet Take 1 tablet by mouth daily 30 tablet 1    mirtazapine (REMERON) 15 MG tablet Take 0.5 tablets by mouth nightly 90 tablet 0    lisinopril (PRINIVIL;ZESTRIL) 40 MG tablet Take 1 tablet by mouth daily 90 tablet 1    venlafaxine (EFFEXOR XR) 37.5 MG extended release capsule Take 1 capsule by mouth 2 times daily 180 capsule 1    omeprazole (PRILOSEC) 20 MG delayed release capsule TAKE ONE CAPSULE BY MOUTH DAILY 90 capsule 3    ondansetron (ZOFRAN) 4 MG tablet Take 1 tablet by mouth daily as needed for Nausea or Vomiting 30 tablet 0     No current facility-administered medications for this visit.      No Known Allergies      HPI:     HPI    Presents today c/o HTN / lab work follow-up   Taking lisinopril , amlodipine   Lisinopril dose was increased with mild improvement in BP readings 1 month ago, still running 150-160 / 's on average  Amlodipine added earlier this week (Tuesday)   Admits to associated dizziness , occasional headaches   Declines any cough, dyspnea, chest pain or edema  No recent changes in medications  No OTC supplements or stimulants      H/o iron deficient anemia   Taken off PO iron with stabilization of hemoglobin  Declines any bleeding complaints     Recent liver enzymes were elevated   Takes Tylenol qhs   Drinks 1 can of beer per day   No known history of liver disease     Health Maintenance:      Subjective:     Review of Systems   Constitutional: Negative. Negative for appetite change, chills, diaphoresis and fever. HENT: Negative. Eyes: Negative. Respiratory: Negative. Negative for cough, chest tightness, shortness of breath and wheezing. Cardiovascular: Negative. Negative for chest pain and leg swelling. Gastrointestinal: Negative. Negative for anal bleeding, blood in stool, diarrhea, nausea and vomiting. Endocrine: Negative. Genitourinary: Negative. Negative for difficulty urinating and hematuria. Musculoskeletal: Positive for arthralgias. Skin: Negative. Negative for color change, pallor, rash and wound. Allergic/Immunologic: Negative. Neurological: Positive for light-headedness. Negative for dizziness, seizures, syncope and headaches. Hematological: Negative. Psychiatric/Behavioral: Negative. Negative for dysphoric mood. The patient is not nervous/anxious. Objective:     Vitals:    05/13/22 1427   BP: (!) 160/90   Pulse: 81   Temp: 97.3 °F (36.3 °C)   SpO2: 99%     Vitals:    05/13/22 1451   BP: (!) 140/80   Pulse:    Temp:    SpO2:        Body mass index is 25.58 kg/m². Physical Exam  Vitals reviewed. Constitutional:       General: She is not in acute distress. Appearance: Normal appearance. She is well-developed and normal weight. She is not ill-appearing, toxic-appearing or diaphoretic. HENT:      Head: Normocephalic and atraumatic.       Right Ear: External ear normal.      Left Ear: External ear normal.      Nose: Nose normal.   Eyes:      General: No scleral icterus. Right eye: No discharge. Left eye: No discharge. Conjunctiva/sclera: Conjunctivae normal.      Pupils: Pupils are equal, round, and reactive to light. Neck:      Trachea: No tracheal deviation. Cardiovascular:      Rate and Rhythm: Normal rate and regular rhythm. Heart sounds: Normal heart sounds. No murmur heard. No friction rub. No gallop. Pulmonary:      Effort: Pulmonary effort is normal. No tachypnea, accessory muscle usage or respiratory distress. Breath sounds: Normal breath sounds. No stridor. No decreased breath sounds, wheezing, rhonchi or rales. Abdominal:      Palpations: Abdomen is soft. Musculoskeletal:         General: No tenderness or deformity. Normal range of motion. Cervical back: Normal range of motion and neck supple. Skin:     General: Skin is warm and dry. Coloration: Skin is not pale. Findings: No erythema or rash. Neurological:      Mental Status: She is alert and oriented to person, place, and time. GCS: GCS eye subscore is 4. GCS verbal subscore is 5. GCS motor subscore is 6. Gait: Gait is intact. Gait normal.   Psychiatric:         Speech: Speech normal.         Behavior: Behavior normal.         Thought Content: Thought content normal.         Judgment: Judgment normal.           Assessment:         1. Primary hypertension    2. Elevated LFTs    3. Iron deficiency anemia secondary to inadequate dietary iron intake    4. Bilateral elbow joint pain    5. Other fatigue     6. At high risk for falls        Plan:     1. Primary hypertension    - TSH with Reflex; Future    BP elevated above goal  Continue lisinopril, add amlodipine   Call me in 2 weeks with BP log update  Follow-up 1 month   Lifestyle modifications     2. Elevated LFTs    - Hepatic Function Panel;  Future  - Hepatitis Panel, Acute; Future  - TSH with Reflex; Future  - US LIVER; Future    No alcohol / Tylenol  Recommended calling psychiatry to inquire if related to Remeron   Further work-up as noted above     3. Iron deficiency anemia secondary to inadequate dietary iron intake    CBC stable, continue to monitor     4. Bilateral elbow joint pain    - diclofenac sodium (VOLTAREN) 1 % GEL; Apply 2-4 g topically 4 times daily as needed for Pain  Dispense: 150 g; Refill: 1    Recommended bracing & Orthopedic referral PRN     5. Other fatigue     - Hepatitis Panel, Acute; Future    6. At high risk for falls    On the basis of positive falls risk screening, assessment and plan is as follows: home safety tips provided. Discussed use, benefit, and side effects of prescribed medications. All patient questions answered. Pt voiced understanding. Reviewed health maintenance. Instructed to continue current medications, diet and exercise. Patient agreedwith treatment plan. Follow up as directed.      Electronically signed by PABLITO Allen CNP on 5/13/2022

## 2022-05-26 LAB
A/G RATIO: NORMAL
ALBUMIN SERPL-MCNC: 3.7 G/DL
ALP BLD-CCNC: 96 U/L
ALT SERPL-CCNC: 65 U/L
AST SERPL-CCNC: 90 U/L
BILIRUB SERPL-MCNC: 0.6 MG/DL (ref 0.1–1.4)
BILIRUBIN DIRECT: 0 MG/DL
BILIRUBIN, INDIRECT: 0.6
GLOBULIN: NORMAL
PROTEIN TOTAL: 6.9 G/DL
TSH SERPL DL<=0.05 MIU/L-ACNC: 0.93 UIU/ML

## 2022-06-15 ENCOUNTER — HOSPITAL ENCOUNTER (OUTPATIENT)
Dept: ULTRASOUND IMAGING | Age: 82
Discharge: HOME | End: 2022-06-17
Payer: COMMERCIAL

## 2022-06-15 DIAGNOSIS — R79.89 ELEVATED LFTS: ICD-10-CM

## 2022-06-15 PROCEDURE — 76705 ECHO EXAM OF ABDOMEN: CPT

## 2022-06-16 ENCOUNTER — OFFICE VISIT (OUTPATIENT)
Dept: FAMILY MEDICINE CLINIC | Age: 82
End: 2022-06-16
Payer: COMMERCIAL

## 2022-06-16 VITALS
OXYGEN SATURATION: 97 % | DIASTOLIC BLOOD PRESSURE: 76 MMHG | HEART RATE: 96 BPM | WEIGHT: 126.4 LBS | BODY MASS INDEX: 25.1 KG/M2 | TEMPERATURE: 96.7 F | SYSTOLIC BLOOD PRESSURE: 124 MMHG

## 2022-06-16 DIAGNOSIS — R14.0 ABDOMINAL BLOATING: Primary | ICD-10-CM

## 2022-06-16 DIAGNOSIS — R79.89 ELEVATED LFTS: ICD-10-CM

## 2022-06-16 DIAGNOSIS — R63.5 ABNORMAL WEIGHT GAIN: ICD-10-CM

## 2022-06-16 DIAGNOSIS — K82.4 GALLBLADDER POLYP: ICD-10-CM

## 2022-06-16 DIAGNOSIS — R06.02 SHORTNESS OF BREATH: ICD-10-CM

## 2022-06-16 DIAGNOSIS — R14.0 ABDOMINAL DISTENSION: ICD-10-CM

## 2022-06-16 DIAGNOSIS — I10 PRIMARY HYPERTENSION: ICD-10-CM

## 2022-06-16 PROCEDURE — 1123F ACP DISCUSS/DSCN MKR DOCD: CPT | Performed by: NURSE PRACTITIONER

## 2022-06-16 PROCEDURE — 99214 OFFICE O/P EST MOD 30 MIN: CPT | Performed by: NURSE PRACTITIONER

## 2022-06-16 ASSESSMENT — ENCOUNTER SYMPTOMS
SHORTNESS OF BREATH: 1
BELCHING: 1
ALLERGIC/IMMUNOLOGIC NEGATIVE: 1
STRIDOR: 0
DIARRHEA: 0
CHEST TIGHTNESS: 0
ABDOMINAL PAIN: 0
HEMATOCHEZIA: 0
EYES NEGATIVE: 1
CONSTIPATION: 0
COLOR CHANGE: 0
ANAL BLEEDING: 0
VOMITING: 0
COUGH: 0
NAUSEA: 1
BLOOD IN STOOL: 0
FLATUS: 1
CHOKING: 0
WHEEZING: 0
ABDOMINAL DISTENTION: 1

## 2022-06-16 ASSESSMENT — PATIENT HEALTH QUESTIONNAIRE - PHQ9
7. TROUBLE CONCENTRATING ON THINGS, SUCH AS READING THE NEWSPAPER OR WATCHING TELEVISION: 0
SUM OF ALL RESPONSES TO PHQ QUESTIONS 1-9: 5
10. IF YOU CHECKED OFF ANY PROBLEMS, HOW DIFFICULT HAVE THESE PROBLEMS MADE IT FOR YOU TO DO YOUR WORK, TAKE CARE OF THINGS AT HOME, OR GET ALONG WITH OTHER PEOPLE: 1
9. THOUGHTS THAT YOU WOULD BE BETTER OFF DEAD, OR OF HURTING YOURSELF: 0
6. FEELING BAD ABOUT YOURSELF - OR THAT YOU ARE A FAILURE OR HAVE LET YOURSELF OR YOUR FAMILY DOWN: 0
SUM OF ALL RESPONSES TO PHQ QUESTIONS 1-9: 5
1. LITTLE INTEREST OR PLEASURE IN DOING THINGS: 0
SUM OF ALL RESPONSES TO PHQ9 QUESTIONS 1 & 2: 1
2. FEELING DOWN, DEPRESSED OR HOPELESS: 1
3. TROUBLE FALLING OR STAYING ASLEEP: 0
SUM OF ALL RESPONSES TO PHQ QUESTIONS 1-9: 5
5. POOR APPETITE OR OVEREATING: 3
SUM OF ALL RESPONSES TO PHQ QUESTIONS 1-9: 5
4. FEELING TIRED OR HAVING LITTLE ENERGY: 1

## 2022-06-16 ASSESSMENT — CROHNS DISEASE ACTIVITY INDEX (CDAI): CDAI SCORE: 0

## 2022-06-16 NOTE — PROGRESS NOTES
[facility]  [address] [facility]  Dept: [address] is a 81 y.o. female who presents today for her medical conditions/complaintsas noted below. Jasson Noguera is here today c/o Hypertension and Results (liver U/S)    Past Medical History:   Diagnosis Date    Anxiety and depression     Colitis     w/ inpatient colonscopy polyp removal    GERD (gastroesophageal reflux disease)     HTN (hypertension) 2014    Restless legs 2020      Past Surgical History:   Procedure Laterality Date    APPENDECTOMY      HYSTERECTOMY         Family History   Problem Relation Age of Onset    Heart Disease Father        Social History     Tobacco Use    Smoking status: Former Smoker     Packs/day: 0.25     Years: 30.00     Pack years: 7.50     Types: Cigarettes     Quit date: 1980     Years since quittin.4    Smokeless tobacco: Never Used   Substance Use Topics    Alcohol use: Yes     Comment: socially       Current Outpatient Medications   Medication Sig Dispense Refill    diclofenac sodium (VOLTAREN) 1 % GEL Apply 2-4 g topically 4 times daily as needed for Pain 150 g 1    Multiple Vitamins-Minerals (MULTIVITAMIN ADULT) CHEW Take 1 tablet by mouth daily 1 tablet 0    amLODIPine (NORVASC) 5 MG tablet Take 1 tablet by mouth daily 30 tablet 1    mirtazapine (REMERON) 15 MG tablet Take 0.5 tablets by mouth nightly 90 tablet 0    lisinopril (PRINIVIL;ZESTRIL) 40 MG tablet Take 1 tablet by mouth daily 90 tablet 1    venlafaxine (EFFEXOR XR) 37.5 MG extended release capsule Take 1 capsule by mouth 2 times daily 180 capsule 1    omeprazole (PRILOSEC) 20 MG delayed release capsule TAKE ONE CAPSULE BY MOUTH DAILY 90 capsule 3    ondansetron (ZOFRAN) 4 MG tablet Take 1 tablet by mouth daily as needed for Nausea or Vomiting 30 tablet 0     No current facility-administered medications for this visit.      No Known Allergies      HPI:     Abdominal Pain  This is a new (bloating/distension - NO pain) problem. The current episode started more than 1 month ago (reports gradual 14 lb weight gain within the past few months). The onset quality is gradual. The problem occurs daily. The problem has been gradually worsening. The pain is located in the generalized abdominal region. The pain is at a severity of 0/10. The patient is experiencing no pain. Associated symptoms include anorexia, belching, flatus and nausea (intermittent). Pertinent negatives include no arthralgias, constipation, diarrhea, dysuria, fever, frequency, hematochezia, hematuria, melena, myalgias, vomiting or weight loss. Associated symptoms comments: Early satiety. Nothing aggravates the pain. The pain is relieved by nothing. She has tried nothing for the symptoms. The treatment provided no relief. Prior diagnostic workup includes ultrasound (labs). Her past medical history is significant for abdominal surgery (s/p appendectomy) and GERD. There is no history of colon cancer, Crohn's disease, gallstones, irritable bowel syndrome, pancreatitis, PUD or ulcerative colitis. Appetite is decreased but has gained weight   Was placed on Remeron around the same time  H/o GERD otherwise no obvious GI disease   Recently LFT's were elevated  No h/o liver disease or alcohol abuse   Had liver ultrasound yesterday     Impression   Nonspecific heterogeneous echotexture liver without focal lesion.       Multiple 2-3 mm gallbladder polyps.       Simple right renal cyst.         Health Maintenance:      Subjective:     Review of Systems   Constitutional: Positive for appetite change (dec.), fatigue and unexpected weight change (14 lb weight gain). Negative for chills, diaphoresis, fever and weight loss. Eyes: Negative. Respiratory: Positive for shortness of breath. Negative for cough, choking, chest tightness, wheezing and stridor. Cardiovascular: Negative. Negative for chest pain and leg swelling. Gastrointestinal: Positive for abdominal distention, anorexia, flatus and nausea (intermittent). Negative for abdominal pain (DECLINES PAIN), anal bleeding, blood in stool, constipation, diarrhea, hematochezia, melena and vomiting. Endocrine: Negative. Genitourinary: Negative. Negative for difficulty urinating, dysuria, frequency, hematuria and urgency. Musculoskeletal: Negative. Negative for arthralgias and myalgias. Skin: Negative. Negative for color change, pallor, rash and wound. Allergic/Immunologic: Negative. Neurological: Negative for seizures, syncope and facial asymmetry. Hematological: Negative. Psychiatric/Behavioral: Negative. Objective:     Vitals:    06/16/22 1546   BP: 124/76   Pulse: 96   Temp: (!) 96.7 °F (35.9 °C)   SpO2: 97%       Body mass index is 25.1 kg/m². Physical Exam  Constitutional:       General: She is not in acute distress. Appearance: She is well-developed. She is not ill-appearing, toxic-appearing or diaphoretic. HENT:      Head: Normocephalic and atraumatic. Right Ear: External ear normal.      Left Ear: External ear normal.      Nose: Nose normal.   Eyes:      General: No scleral icterus. Right eye: No discharge. Left eye: No discharge. Conjunctiva/sclera: Conjunctivae normal.      Pupils: Pupils are equal, round, and reactive to light. Neck:      Trachea: No tracheal deviation. Cardiovascular:      Rate and Rhythm: Normal rate and regular rhythm. Heart sounds: Normal heart sounds. No murmur heard. No friction rub. No gallop. Pulmonary:      Effort: Pulmonary effort is normal. No tachypnea, accessory muscle usage or respiratory distress. Breath sounds: Normal breath sounds. No stridor. No decreased breath sounds, wheezing, rhonchi or rales. Abdominal:      General: Abdomen is protuberant. Bowel sounds are normal. There is distension. Palpations: Abdomen is soft. Tenderness:  There is no abdominal tenderness. Comments: Bloated soft abdomen      Musculoskeletal:         General: No tenderness or deformity. Normal range of motion. Cervical back: Normal range of motion and neck supple. Right lower leg: No edema. Left lower leg: No edema. Skin:     General: Skin is warm and dry. Coloration: Skin is not pale. Findings: No erythema or rash. Neurological:      Mental Status: She is alert and oriented to person, place, and time. GCS: GCS eye subscore is 4. GCS verbal subscore is 5. GCS motor subscore is 6. Gait: Gait is intact. Gait normal.   Psychiatric:         Speech: Speech normal.         Behavior: Behavior normal.         Thought Content: Thought content normal.         Judgment: Judgment normal.           Assessment:         1. Abdominal bloating    2. Abdominal distension    3. Elevated LFTs    4. Abnormal weight gain    5. Shortness of breath    6. Primary hypertension    7. Gallbladder polyp        Plan:     1. Abdominal bloating    - CT ABDOMEN PELVIS W IV CONTRAST; Future  - CBC with Auto Differential; Future  - Lipase; Future  - Comprehensive Metabolic Panel; Future  - Hepatitis Panel, Acute; Future    Check labs , CT scan  Follow-up 3 weeks    2. Abdominal distension    - CT ABDOMEN PELVIS W IV CONTRAST; Future  - CBC with Auto Differential; Future  - Lipase; Future  - Comprehensive Metabolic Panel; Future  - Hepatitis Panel, Acute; Future  - Brain Natriuretic Peptide; Future    3. Elevated LFTs    - CT ABDOMEN PELVIS W IV CONTRAST; Future  - CBC with Auto Differential; Future  - Lipase; Future  - Comprehensive Metabolic Panel; Future  - Hepatitis Panel, Acute; Future    4. Abnormal weight gain    - CT ABDOMEN PELVIS W IV CONTRAST; Future  - CBC with Auto Differential; Future  - Comprehensive Metabolic Panel; Future  - Brain Natriuretic Peptide; Future    5. Shortness of breath    - CT ABDOMEN PELVIS W IV CONTRAST;  Future  - CBC with Auto Differential; Future  - Comprehensive Metabolic Panel; Future  - Brain Natriuretic Peptide; Future    6. Primary hypertension    - CBC with Auto Differential; Future  - Comprehensive Metabolic Panel; Future  - TSH with Reflex; Future    BP controlled, continue current medications     7. Gallbladder polyp      Discussed use, benefit, and side effects of prescribed medications. All patient questions answered. Pt voiced understanding. Reviewed health maintenance. Instructed to continue current medications, diet and exercise. Patient agreedwith treatment plan. Follow up as directed.      Electronically signed by PABLITO Clements CNP on 6/16/2022

## 2022-06-21 DIAGNOSIS — R14.0 ABDOMINAL DISTENSION: ICD-10-CM

## 2022-06-21 DIAGNOSIS — R14.0 ABDOMINAL BLOATING: ICD-10-CM

## 2022-06-21 DIAGNOSIS — R79.89 ELEVATED LFTS: ICD-10-CM

## 2022-06-21 DIAGNOSIS — I10 PRIMARY HYPERTENSION: ICD-10-CM

## 2022-06-23 DIAGNOSIS — R76.8 POSITIVE HEPATITIS C ANTIBODY TEST: Primary | ICD-10-CM

## 2022-07-01 ENCOUNTER — HOSPITAL ENCOUNTER (OUTPATIENT)
Dept: CT IMAGING | Age: 82
Discharge: HOME | End: 2022-07-03
Payer: MEDICARE

## 2022-07-01 ENCOUNTER — HOSPITAL ENCOUNTER (OUTPATIENT)
Age: 82
Discharge: HOME | End: 2022-07-01
Payer: MEDICARE

## 2022-07-01 DIAGNOSIS — R06.02 SHORTNESS OF BREATH: ICD-10-CM

## 2022-07-01 DIAGNOSIS — R14.0 ABDOMINAL DISTENSION: ICD-10-CM

## 2022-07-01 DIAGNOSIS — R14.0 ABDOMINAL BLOATING: ICD-10-CM

## 2022-07-01 DIAGNOSIS — R63.5 ABNORMAL WEIGHT GAIN: ICD-10-CM

## 2022-07-01 DIAGNOSIS — I10 PRIMARY HYPERTENSION: ICD-10-CM

## 2022-07-01 DIAGNOSIS — R79.89 ELEVATED LFTS: ICD-10-CM

## 2022-07-01 DIAGNOSIS — R76.8 POSITIVE HEPATITIS C ANTIBODY TEST: ICD-10-CM

## 2022-07-01 LAB
ABSOLUTE EOS #: 0.31 K/UL (ref 0–0.44)
ABSOLUTE IMMATURE GRANULOCYTE: <0.03 K/UL (ref 0–0.3)
ABSOLUTE LYMPH #: 1.27 K/UL (ref 1.1–3.7)
ABSOLUTE MONO #: 0.57 K/UL (ref 0.1–1.2)
ALBUMIN SERPL-MCNC: 3.7 G/DL (ref 3.5–5.2)
ALBUMIN/GLOBULIN RATIO: 1.1 (ref 1–2.5)
ALP BLD-CCNC: 146 U/L (ref 35–104)
ALT SERPL-CCNC: 49 U/L (ref 5–33)
ANION GAP SERPL CALCULATED.3IONS-SCNC: 11 MMOL/L (ref 9–17)
AST SERPL-CCNC: 68 U/L
BASOPHILS # BLD: 1 % (ref 0–2)
BASOPHILS ABSOLUTE: 0.06 K/UL (ref 0–0.2)
BILIRUB SERPL-MCNC: 0.51 MG/DL (ref 0.3–1.2)
BUN BLDV-MCNC: 7 MG/DL (ref 8–23)
CALCIUM SERPL-MCNC: 9.4 MG/DL (ref 8.6–10.4)
CHLORIDE BLD-SCNC: 104 MMOL/L (ref 98–107)
CO2: 25 MMOL/L (ref 20–31)
CREAT SERPL-MCNC: 0.66 MG/DL (ref 0.5–0.9)
CREAT SERPL-MCNC: 0.71 MG/DL (ref 0.5–0.9)
EOSINOPHILS RELATIVE PERCENT: 5 % (ref 1–4)
GFR AFRICAN AMERICAN: >60 ML/MIN
GFR AFRICAN AMERICAN: >60 ML/MIN
GFR NON-AFRICAN AMERICAN: >60 ML/MIN
GFR NON-AFRICAN AMERICAN: >60 ML/MIN
GFR SERPL CREATININE-BSD FRML MDRD: ABNORMAL ML/MIN/{1.73_M2}
GFR SERPL CREATININE-BSD FRML MDRD: NORMAL ML/MIN/{1.73_M2}
GLUCOSE BLD-MCNC: 72 MG/DL (ref 70–99)
HCT VFR BLD CALC: 36.1 % (ref 36.3–47.1)
HEMOGLOBIN: 11.7 G/DL (ref 11.9–15.1)
IMMATURE GRANULOCYTES: 0 %
LIPASE: 69 U/L (ref 13–60)
LYMPHOCYTES # BLD: 22 % (ref 24–43)
MCH RBC QN AUTO: 33.9 PG (ref 25.2–33.5)
MCHC RBC AUTO-ENTMCNC: 32.4 G/DL (ref 28.4–34.8)
MCV RBC AUTO: 104.6 FL (ref 82.6–102.9)
MONOCYTES # BLD: 10 % (ref 3–12)
NRBC AUTOMATED: 0 PER 100 WBC
PDW BLD-RTO: 12.1 % (ref 11.8–14.4)
PLATELET # BLD: 228 K/UL (ref 138–453)
PMV BLD AUTO: 10 FL (ref 8.1–13.5)
POTASSIUM SERPL-SCNC: 4 MMOL/L (ref 3.7–5.3)
PRO-BNP: 208 PG/ML
RBC # BLD: 3.45 M/UL (ref 3.95–5.11)
RBC # BLD: ABNORMAL 10*6/UL
SEG NEUTROPHILS: 62 % (ref 36–65)
SEGMENTED NEUTROPHILS ABSOLUTE COUNT: 3.5 K/UL (ref 1.5–8.1)
SODIUM BLD-SCNC: 140 MMOL/L (ref 135–144)
TOTAL PROTEIN: 7.2 G/DL (ref 6.4–8.3)
WBC # BLD: 5.7 K/UL (ref 3.5–11.3)

## 2022-07-01 PROCEDURE — 83690 ASSAY OF LIPASE: CPT

## 2022-07-01 PROCEDURE — 80053 COMPREHEN METABOLIC PANEL: CPT

## 2022-07-01 PROCEDURE — 36415 COLL VENOUS BLD VENIPUNCTURE: CPT

## 2022-07-01 PROCEDURE — 74177 CT ABD & PELVIS W/CONTRAST: CPT

## 2022-07-01 PROCEDURE — 6360000004 HC RX CONTRAST MEDICATION: Performed by: NURSE PRACTITIONER

## 2022-07-01 PROCEDURE — 83880 ASSAY OF NATRIURETIC PEPTIDE: CPT

## 2022-07-01 PROCEDURE — 82565 ASSAY OF CREATININE: CPT

## 2022-07-01 PROCEDURE — 2580000003 HC RX 258: Performed by: NURSE PRACTITIONER

## 2022-07-01 PROCEDURE — 85025 COMPLETE CBC W/AUTO DIFF WBC: CPT

## 2022-07-01 PROCEDURE — 87522 HEPATITIS C REVRS TRNSCRPJ: CPT

## 2022-07-01 RX ORDER — 0.9 % SODIUM CHLORIDE 0.9 %
100 INTRAVENOUS SOLUTION INTRAVENOUS ONCE
Status: COMPLETED | OUTPATIENT
Start: 2022-07-01 | End: 2022-07-01

## 2022-07-01 RX ORDER — SODIUM CHLORIDE 0.9 % (FLUSH) 0.9 %
10 SYRINGE (ML) INJECTION PRN
Status: DISCONTINUED | OUTPATIENT
Start: 2022-07-01 | End: 2022-07-04 | Stop reason: HOSPADM

## 2022-07-01 RX ADMIN — IOPAMIDOL 75 ML: 755 INJECTION, SOLUTION INTRAVENOUS at 14:31

## 2022-07-01 RX ADMIN — SODIUM CHLORIDE 80 ML: 9 INJECTION, SOLUTION INTRAVENOUS at 14:32

## 2022-07-01 RX ADMIN — IOHEXOL 30 ML: 300 INJECTION, SOLUTION INTRAVENOUS at 14:32

## 2022-07-02 DIAGNOSIS — I10 PRIMARY HYPERTENSION: ICD-10-CM

## 2022-07-03 LAB
HEPATITIS C RNA PCR QUANT: DETECTED
HEPATITIS C RNA QUANT LOG: 5.96 LOG IU/ML
HEPATITIS C RNA-PCR: ABNORMAL IU/ML
SOURCE: ABNORMAL

## 2022-07-05 DIAGNOSIS — N32.89 BLADDER WALL THICKENING: Primary | ICD-10-CM

## 2022-07-05 DIAGNOSIS — K21.9 GASTROESOPHAGEAL REFLUX DISEASE WITHOUT ESOPHAGITIS: ICD-10-CM

## 2022-07-05 DIAGNOSIS — R82.998 OTHER ABNORMAL FINDINGS IN URINE: ICD-10-CM

## 2022-07-05 PROBLEM — B17.10 ACUTE HEPATITIS C VIRUS INFECTION WITHOUT HEPATIC COMA: Status: ACTIVE | Noted: 2022-07-05

## 2022-07-05 RX ORDER — OMEPRAZOLE 40 MG/1
40 CAPSULE, DELAYED RELEASE ORAL DAILY
Qty: 90 CAPSULE | Refills: 3 | Status: SHIPPED | OUTPATIENT
Start: 2022-07-05

## 2022-07-05 RX ORDER — AMLODIPINE BESYLATE 5 MG/1
5 TABLET ORAL DAILY
Qty: 90 TABLET | Refills: 1 | Status: SHIPPED | OUTPATIENT
Start: 2022-07-05

## 2022-07-05 NOTE — TELEPHONE ENCOUNTER
Lena  is calling to request a refill on the following medication(s):    Medication Request:  Requested Prescriptions     Pending Prescriptions Disp Refills    amLODIPine (NORVASC) 5 MG tablet [Pharmacy Med Name: amLODIPine BESYLATE 5 MG TAB] 30 tablet 1     Sig: TAKE ONE TABLET BY MOUTH DAILY       Last Visit Date (If Applicable):  0/47/4266    Next Visit Date:    7/8/2022

## 2022-07-07 ENCOUNTER — HOSPITAL ENCOUNTER (OUTPATIENT)
Age: 82
Setting detail: SPECIMEN
Discharge: HOME | End: 2022-07-07

## 2022-07-07 DIAGNOSIS — N32.89 BLADDER WALL THICKENING: ICD-10-CM

## 2022-07-07 DIAGNOSIS — R82.998 OTHER ABNORMAL FINDINGS IN URINE: ICD-10-CM

## 2022-07-07 LAB
-: ABNORMAL
BACTERIA: ABNORMAL
BILIRUBIN URINE: NEGATIVE
CASTS UA: ABNORMAL /LPF (ref 0–8)
COLOR: YELLOW
EPITHELIAL CELLS UA: ABNORMAL /HPF (ref 0–5)
GLUCOSE URINE: NEGATIVE
KETONES, URINE: NEGATIVE
LEUKOCYTE ESTERASE, URINE: ABNORMAL
NITRITE, URINE: NEGATIVE
PH UA: 7.5 (ref 5–8)
PROTEIN UA: NEGATIVE
RBC UA: ABNORMAL /HPF (ref 0–4)
SPECIFIC GRAVITY UA: 1.02 (ref 1–1.03)
TURBIDITY: CLEAR
URINE HGB: NEGATIVE
UROBILINOGEN, URINE: NORMAL
WBC UA: ABNORMAL /HPF (ref 0–5)

## 2022-07-08 ENCOUNTER — OFFICE VISIT (OUTPATIENT)
Dept: FAMILY MEDICINE CLINIC | Age: 82
End: 2022-07-08
Payer: MEDICARE

## 2022-07-08 VITALS
BODY MASS INDEX: 25.06 KG/M2 | OXYGEN SATURATION: 98 % | SYSTOLIC BLOOD PRESSURE: 132 MMHG | HEART RATE: 102 BPM | DIASTOLIC BLOOD PRESSURE: 78 MMHG | TEMPERATURE: 97.6 F | WEIGHT: 126.2 LBS

## 2022-07-08 DIAGNOSIS — B17.10 ACUTE HEPATITIS C VIRUS INFECTION WITHOUT HEPATIC COMA: Primary | ICD-10-CM

## 2022-07-08 DIAGNOSIS — K40.90 INGUINAL HERNIA WITHOUT OBSTRUCTION OR GANGRENE, RECURRENCE NOT SPECIFIED, UNSPECIFIED LATERALITY: ICD-10-CM

## 2022-07-08 DIAGNOSIS — S32.040A COMPRESSION FRACTURE OF L4 VERTEBRA, INITIAL ENCOUNTER (HCC): ICD-10-CM

## 2022-07-08 DIAGNOSIS — F33.41 RECURRENT MAJOR DEPRESSIVE DISORDER, IN PARTIAL REMISSION (HCC): ICD-10-CM

## 2022-07-08 DIAGNOSIS — R79.89 ELEVATED LFTS: ICD-10-CM

## 2022-07-08 DIAGNOSIS — G89.29 CHRONIC RIGHT SHOULDER PAIN: ICD-10-CM

## 2022-07-08 DIAGNOSIS — M25.511 CHRONIC RIGHT SHOULDER PAIN: ICD-10-CM

## 2022-07-08 DIAGNOSIS — Z13.31 POSITIVE DEPRESSION SCREENING: ICD-10-CM

## 2022-07-08 LAB
CULTURE: NORMAL
SPECIMEN DESCRIPTION: NORMAL

## 2022-07-08 PROCEDURE — 1036F TOBACCO NON-USER: CPT | Performed by: NURSE PRACTITIONER

## 2022-07-08 PROCEDURE — G8399 PT W/DXA RESULTS DOCUMENT: HCPCS | Performed by: NURSE PRACTITIONER

## 2022-07-08 PROCEDURE — 1090F PRES/ABSN URINE INCON ASSESS: CPT | Performed by: NURSE PRACTITIONER

## 2022-07-08 PROCEDURE — 99214 OFFICE O/P EST MOD 30 MIN: CPT | Performed by: NURSE PRACTITIONER

## 2022-07-08 PROCEDURE — 1123F ACP DISCUSS/DSCN MKR DOCD: CPT | Performed by: NURSE PRACTITIONER

## 2022-07-08 PROCEDURE — G8417 CALC BMI ABV UP PARAM F/U: HCPCS | Performed by: NURSE PRACTITIONER

## 2022-07-08 PROCEDURE — G8427 DOCREV CUR MEDS BY ELIG CLIN: HCPCS | Performed by: NURSE PRACTITIONER

## 2022-07-08 RX ORDER — MIRTAZAPINE 15 MG/1
7.5 TABLET, FILM COATED ORAL NIGHTLY
Qty: 90 TABLET | Refills: 1 | Status: SHIPPED | OUTPATIENT
Start: 2022-07-08

## 2022-07-08 RX ORDER — MIRTAZAPINE 15 MG/1
7.5 TABLET, FILM COATED ORAL NIGHTLY
Qty: 90 TABLET | Refills: 0 | Status: CANCELLED | OUTPATIENT
Start: 2022-07-08

## 2022-07-08 RX ORDER — NAPROXEN 500 MG/1
500 TABLET ORAL 2 TIMES DAILY WITH MEALS
Qty: 28 TABLET | Refills: 0 | Status: SHIPPED | OUTPATIENT
Start: 2022-07-08 | End: 2022-07-22

## 2022-07-08 ASSESSMENT — ENCOUNTER SYMPTOMS
RESPIRATORY NEGATIVE: 1
DIARRHEA: 0
VOMITING: 1
ABDOMINAL PAIN: 0
NAUSEA: 1
CONSTIPATION: 0
EYES NEGATIVE: 1
COLOR CHANGE: 0
ALLERGIC/IMMUNOLOGIC NEGATIVE: 1

## 2022-07-08 ASSESSMENT — PATIENT HEALTH QUESTIONNAIRE - PHQ9
5. POOR APPETITE OR OVEREATING: 3
9. THOUGHTS THAT YOU WOULD BE BETTER OFF DEAD, OR OF HURTING YOURSELF: 0
6. FEELING BAD ABOUT YOURSELF - OR THAT YOU ARE A FAILURE OR HAVE LET YOURSELF OR YOUR FAMILY DOWN: 0
SUM OF ALL RESPONSES TO PHQ9 QUESTIONS 1 & 2: 4
7. TROUBLE CONCENTRATING ON THINGS, SUCH AS READING THE NEWSPAPER OR WATCHING TELEVISION: 0
10. IF YOU CHECKED OFF ANY PROBLEMS, HOW DIFFICULT HAVE THESE PROBLEMS MADE IT FOR YOU TO DO YOUR WORK, TAKE CARE OF THINGS AT HOME, OR GET ALONG WITH OTHER PEOPLE: 1
4. FEELING TIRED OR HAVING LITTLE ENERGY: 3
SUM OF ALL RESPONSES TO PHQ QUESTIONS 1-9: 13
3. TROUBLE FALLING OR STAYING ASLEEP: 3
1. LITTLE INTEREST OR PLEASURE IN DOING THINGS: 3
SUM OF ALL RESPONSES TO PHQ QUESTIONS 1-9: 13
2. FEELING DOWN, DEPRESSED OR HOPELESS: 1
SUM OF ALL RESPONSES TO PHQ QUESTIONS 1-9: 13
8. MOVING OR SPEAKING SO SLOWLY THAT OTHER PEOPLE COULD HAVE NOTICED. OR THE OPPOSITE, BEING SO FIGETY OR RESTLESS THAT YOU HAVE BEEN MOVING AROUND A LOT MORE THAN USUAL: 0
SUM OF ALL RESPONSES TO PHQ QUESTIONS 1-9: 13

## 2022-07-08 NOTE — PATIENT INSTRUCTIONS
[facility] - Christiano Montoya  [address], [place] [id number] [address] Naomi Moreau  [phone]

## 2022-07-08 NOTE — PROGRESS NOTES
[facility]  [address] [facility]  Dept: [address] is a 81 y.o. female who presents today for her medical conditions/complaintsas noted below.       Lizbeth Andrade is here today c/o Results    Past Medical History:   Diagnosis Date    Acute hepatitis C virus infection without hepatic coma 2022    Anxiety and depression     Colitis     w/ inpatient colonscopy polyp removal    GERD (gastroesophageal reflux disease)     HTN (hypertension) 2014    Restless legs 2020      Past Surgical History:   Procedure Laterality Date    APPENDECTOMY      HYSTERECTOMY (CERVIX STATUS UNKNOWN)         Family History   Problem Relation Age of Onset    Heart Disease Father        Social History     Tobacco Use    Smoking status: Former Smoker     Packs/day: 0.25     Years: 30.00     Pack years: 7.50     Types: Cigarettes     Quit date: 1980     Years since quittin.5    Smokeless tobacco: Never Used   Substance Use Topics    Alcohol use: Yes     Comment: socially       Current Outpatient Medications   Medication Sig Dispense Refill    amLODIPine (NORVASC) 5 MG tablet Take 1 tablet by mouth daily 90 tablet 1    omeprazole (PRILOSEC) 40 MG delayed release capsule Take 1 capsule by mouth Daily 90 capsule 3    diclofenac sodium (VOLTAREN) 1 % GEL Apply 2-4 g topically 4 times daily as needed for Pain 150 g 1    Multiple Vitamins-Minerals (MULTIVITAMIN ADULT) CHEW Take 1 tablet by mouth daily 1 tablet 0    mirtazapine (REMERON) 15 MG tablet Take 0.5 tablets by mouth nightly 90 tablet 0    lisinopril (PRINIVIL;ZESTRIL) 40 MG tablet Take 1 tablet by mouth daily 90 tablet 1    venlafaxine (EFFEXOR XR) 37.5 MG extended release capsule Take 1 capsule by mouth 2 times daily 180 capsule 1    ondansetron (ZOFRAN) 4 MG tablet Take 1 tablet by mouth daily as needed for Nausea or Vomiting 30 tablet 0     No current facility-administered medications for this visit. No Known Allergies      HPI:     HPI    Presents today c/o follow-up abnormal labs / CT scan    Recently developed elevated LFT's , no resolution on repeat, hepatitis panel positive for Hep C  RNA +   Patient declines any previous h/o liver disease or risk factors   She reports some abdominal bloating, distension, gas, decreased appetite & nausea   CT scan negative for ascites   GI follow-up not yet scheduled     U/A U/C pending due to bladder wall thickening    Indeterminate L4 fx incidental on CT scan   Granddaughter reports h/o falls   Declines any current back pain , doesn't want to pursue further imaging / tx     Impression   Urinary bladder wall thickening versus underdistention could indicate   cystitis.  Correlation with clinical findings and urinalysis required.       Right inguinal hernia contains a loop of unobstructed small bowel.       Age-indeterminate L4 vertebral body compression new from prior study. Consider MRI follow-up if indicated.       RECOMMENDATIONS:   Unavailable     Reports bothersome R shoulder pain  No recent injury   Reports night time symptoms  Limited range of motion   Has tried OTC topicals & Tylenol without relief     Health Maintenance:      Subjective:     Review of Systems   Constitutional: Positive for appetite change (dec.). Negative for chills, diaphoresis and fever. HENT: Negative. Eyes: Negative. Respiratory: Negative. Cardiovascular: Negative. Gastrointestinal: Positive for nausea and vomiting. Negative for abdominal pain, constipation and diarrhea. Endocrine: Negative. Genitourinary: Negative. Negative for difficulty urinating, dysuria, frequency and urgency. Nocturia    Musculoskeletal: Positive for arthralgias. Skin: Negative. Negative for color change, pallor, rash and wound. Allergic/Immunologic: Negative. Neurological: Negative. Negative for seizures, syncope and facial asymmetry. Hematological: Negative. Psychiatric/Behavioral: Negative. Objective:     Vitals:    07/08/22 1457   BP: 132/78   Pulse: (!) 102   Temp: (!) 96.6 °F (35.9 °C)   SpO2: 98%       Body mass index is 25.06 kg/m². Physical Exam  Constitutional:       General: She is not in acute distress. Appearance: Normal appearance. She is well-developed and normal weight. She is not ill-appearing, toxic-appearing or diaphoretic. HENT:      Head: Normocephalic and atraumatic. Right Ear: External ear normal.      Left Ear: External ear normal.      Nose: Nose normal.   Eyes:      General: No scleral icterus. Right eye: No discharge. Left eye: No discharge. Conjunctiva/sclera: Conjunctivae normal.      Pupils: Pupils are equal, round, and reactive to light. Neck:      Trachea: No tracheal deviation. Cardiovascular:      Rate and Rhythm: Normal rate and regular rhythm. Heart sounds: Normal heart sounds. No murmur heard. No friction rub. No gallop. Pulmonary:      Effort: Pulmonary effort is normal. No tachypnea, accessory muscle usage or respiratory distress. Breath sounds: Normal breath sounds. No stridor. No decreased breath sounds, wheezing, rhonchi or rales. Abdominal:      General: Bowel sounds are normal. There is no distension. Palpations: Abdomen is soft. Musculoskeletal:         General: No tenderness or deformity. Right shoulder: No swelling, deformity, effusion, laceration, tenderness or crepitus. Decreased range of motion. Decreased strength. Normal pulse. Cervical back: Normal range of motion and neck supple. Skin:     General: Skin is warm and dry. Coloration: Skin is not pale. Findings: No erythema or rash. Neurological:      Mental Status: She is alert and oriented to person, place, and time. GCS: GCS eye subscore is 4. GCS verbal subscore is 5. GCS motor subscore is 6. Gait: Gait is intact.  Gait normal.   Psychiatric:         Speech: Speech by mouth 2 times daily (with meals) for 14 days  Dispense: 28 tablet; Refill: 0    Medication hepatic dosing verified through UpToDate     Discussed use, benefit, and side effects of prescribed medications. All patient questions answered. Pt voiced understanding. Reviewed health maintenance. Instructed to continue current medications, diet and exercise. Patient agreedwith treatment plan. Follow up as directed.      Electronically signed by PABLITO Gill CNP on 7/8/2022

## 2022-07-18 DIAGNOSIS — M25.511 CHRONIC RIGHT SHOULDER PAIN: ICD-10-CM

## 2022-07-18 DIAGNOSIS — G89.29 CHRONIC RIGHT SHOULDER PAIN: ICD-10-CM

## 2022-07-18 RX ORDER — NAPROXEN 500 MG/1
TABLET ORAL
Qty: 28 TABLET | Refills: 0 | OUTPATIENT
Start: 2022-07-18

## 2022-07-18 NOTE — TELEPHONE ENCOUNTER
Earnest Romero is calling to request a refill on the following medication(s):    Medication Request:  Requested Prescriptions     Pending Prescriptions Disp Refills    naproxen (NAPROSYN) 500 MG tablet [Pharmacy Med Name: NAPROXEN 500 MG TABLET] 28 tablet 0     Sig: TAKE ONE TABLET BY MOUTH TWICE A DAY WITH A MEAL FOR 14 DAYS       Last Visit Date (If Applicable):  2/2/1587    Next Visit Date:    9/16/2022

## 2022-07-19 RX ORDER — LISINOPRIL 40 MG/1
40 TABLET ORAL DAILY
Qty: 90 TABLET | Refills: 2 | Status: SHIPPED | OUTPATIENT
Start: 2022-07-19

## 2022-07-19 NOTE — TELEPHONE ENCOUNTER
Tori Gant is calling to request a refill on the following medication(s):    Medication Request:  Requested Prescriptions     Pending Prescriptions Disp Refills    lisinopril (PRINIVIL;ZESTRIL) 20 MG tablet [Pharmacy Med Name: LISINOPRIL 20 MG TABLET] 90 tablet 1     Sig: TAKE ONE TABLET BY MOUTH DAILY       Last Visit Date (If Applicable):  2/3/8849    Next Visit Date:    9/16/2022

## 2022-09-01 ENCOUNTER — OFFICE VISIT (OUTPATIENT)
Dept: GASTROENTEROLOGY | Age: 82
End: 2022-09-01
Payer: MEDICARE

## 2022-09-01 VITALS
DIASTOLIC BLOOD PRESSURE: 72 MMHG | BODY MASS INDEX: 24.88 KG/M2 | HEIGHT: 59 IN | SYSTOLIC BLOOD PRESSURE: 115 MMHG | WEIGHT: 123.4 LBS

## 2022-09-01 DIAGNOSIS — Z03.89 ENCOUNTER FOR OBSERVATION FOR OTHER SUSPECTED DISEASES AND CONDITIONS RULED OUT: ICD-10-CM

## 2022-09-01 DIAGNOSIS — B18.2 CHRONIC HEPATITIS C WITHOUT HEPATIC COMA (HCC): Primary | ICD-10-CM

## 2022-09-01 DIAGNOSIS — Z11.59 ENCOUNTER FOR SCREENING FOR OTHER VIRAL DISEASES: ICD-10-CM

## 2022-09-01 PROCEDURE — 1036F TOBACCO NON-USER: CPT | Performed by: INTERNAL MEDICINE

## 2022-09-01 PROCEDURE — G8427 DOCREV CUR MEDS BY ELIG CLIN: HCPCS | Performed by: INTERNAL MEDICINE

## 2022-09-01 PROCEDURE — 1090F PRES/ABSN URINE INCON ASSESS: CPT | Performed by: INTERNAL MEDICINE

## 2022-09-01 PROCEDURE — G8420 CALC BMI NORM PARAMETERS: HCPCS | Performed by: INTERNAL MEDICINE

## 2022-09-01 PROCEDURE — 99203 OFFICE O/P NEW LOW 30 MIN: CPT | Performed by: INTERNAL MEDICINE

## 2022-09-01 PROCEDURE — G8399 PT W/DXA RESULTS DOCUMENT: HCPCS | Performed by: INTERNAL MEDICINE

## 2022-09-01 PROCEDURE — 1123F ACP DISCUSS/DSCN MKR DOCD: CPT | Performed by: INTERNAL MEDICINE

## 2022-09-01 ASSESSMENT — ENCOUNTER SYMPTOMS
ABDOMINAL DISTENTION: 0
ABDOMINAL PAIN: 0
ANAL BLEEDING: 0
SHORTNESS OF BREATH: 0
VOICE CHANGE: 0
BLOOD IN STOOL: 0
DIARRHEA: 0
CHOKING: 0
RECTAL PAIN: 0
NAUSEA: 0
VOMITING: 0
TROUBLE SWALLOWING: 0
CONSTIPATION: 0
WHEEZING: 0
COUGH: 0

## 2022-09-01 NOTE — PROGRESS NOTES
Reason for Referral: Chronic otitis C        Chief Complaint   Patient presents with    New Patient     Acute hepatitis c           HISTORY OF PRESENT ILLNESS: Gricel Candelaria is a 81 y.o. female with a past history remarkable for anxiety and depression, vitiligo, chronic GERD, referred for evaluation of chronic hepatitis C with antibodies being positive. Treatment naïve. Noncirrhotic. Route of transmission not known. Smoker: None   Drinking history:None   Illicit drugs:  None   Abdominal surgeries: appendectomy   Prior Colonoscopy: 2016? Prior EGD: None recent  FH of GI issues: None       Past Medical,Family, and Social History reviewed and does contribute to the patient presentingcondition. Patient's PMH/PSH,SH,PSYCH Hx, MEDs, ALLERGIES, and ROS were all reviewed and updated in the appropriate sections.     PAST MEDICAL HISTORY:  Past Medical History:   Diagnosis Date    Acute hepatitis C virus infection without hepatic coma 7/5/2022    Anxiety and depression     Colitis     w/ inpatient colonscopy polyp removal    GERD (gastroesophageal reflux disease)     HTN (hypertension) 12/04/2014    Restless legs 11/17/2020       Past Surgical History:   Procedure Laterality Date    APPENDECTOMY      HYSTERECTOMY (CERVIX STATUS UNKNOWN)         CURRENT MEDICATIONS:    Current Outpatient Medications:     lisinopril (PRINIVIL;ZESTRIL) 40 MG tablet, Take 1 tablet by mouth in the morning., Disp: 90 tablet, Rfl: 2    mirtazapine (REMERON) 15 MG tablet, Take 0.5 tablets by mouth nightly, Disp: 90 tablet, Rfl: 1    amLODIPine (NORVASC) 5 MG tablet, Take 1 tablet by mouth daily, Disp: 90 tablet, Rfl: 1    omeprazole (PRILOSEC) 40 MG delayed release capsule, Take 1 capsule by mouth Daily, Disp: 90 capsule, Rfl: 3    diclofenac sodium (VOLTAREN) 1 % GEL, Apply 2-4 g topically 4 times daily as needed for Pain, Disp: 150 g, Rfl: 1    Multiple Vitamins-Minerals (MULTIVITAMIN ADULT) CHEW, Take 1 tablet by mouth daily, Disp: 1 tablet, Rfl: 0    venlafaxine (EFFEXOR XR) 37.5 MG extended release capsule, Take 1 capsule by mouth 2 times daily, Disp: 180 capsule, Rfl: 1    naproxen (NAPROSYN) 500 MG tablet, Take 1 tablet by mouth 2 times daily (with meals) for 14 days, Disp: 28 tablet, Rfl: 0    ondansetron (ZOFRAN) 4 MG tablet, Take 1 tablet by mouth daily as needed for Nausea or Vomiting (Patient not taking: Reported on 2022), Disp: 30 tablet, Rfl: 0    ALLERGIES:   No Known Allergies    FAMILY HISTORY:       Problem Relation Age of Onset    Heart Disease Father          SOCIAL HISTORY:   Social History     Socioeconomic History    Marital status:      Spouse name: Not on file    Number of children: Not on file    Years of education: Not on file    Highest education level: Not on file   Occupational History    Not on file   Tobacco Use    Smoking status: Former     Packs/day: 0.25     Years: 30.00     Pack years: 7.50     Types: Cigarettes     Quit date: 1980     Years since quittin.6    Smokeless tobacco: Never   Substance and Sexual Activity    Alcohol use: Yes     Comment: socially     Drug use: No    Sexual activity: Not on file   Other Topics Concern    Not on file   Social History Narrative    Not on file     Social Determinants of Health     Financial Resource Strain: Low Risk     Difficulty of Paying Living Expenses: Not hard at all   Food Insecurity: No Food Insecurity    Worried About Running Out of Food in the Last Year: Never true    Ran Out of Food in the Last Year: Never true   Transportation Needs: Not on file   Physical Activity: Not on file   Stress: Not on file   Social Connections: Not on file   Intimate Partner Violence: Not on file   Housing Stability: Not on file         REVIEW OF SYSTEMS: A 12-point review of systems was obtained and pertinent positives and negatives were listed below. REVIEW OF SYSTEMS:     Constitutional: No fever, no chills, no lethargy, no weakness.   HEENT: No headache, otalgia, itchy eyes, nasal discharge or sore throat. Cardiac:  No chest pain, dyspnea, orthopnea or PND. Chest:   No cough, phlegm or wheezing. Abdomen:      Detailed by MA   Neuro:  No focal weakness, abnormal movements or seizure like activity. Skin:   No rashes, no itching. :   No hematuria, no pyuria, no dysuria, no flank pain. Extremities:  No swelling or joint pains. ROS was otherwise negative    Review of Systems   Constitutional:  Negative for appetite change, fatigue and unexpected weight change. HENT:  Negative for trouble swallowing and voice change. Eyes:  Negative for visual disturbance. Respiratory:  Negative for cough, choking, shortness of breath and wheezing. Cardiovascular:  Negative for chest pain, palpitations and leg swelling. Gastrointestinal:  Negative for abdominal distention, abdominal pain, anal bleeding, blood in stool, constipation, diarrhea, nausea, rectal pain and vomiting. Genitourinary:  Negative for difficulty urinating. Neurological:  Negative for dizziness, seizures, weakness, numbness and headaches. Hematological:  Bruises/bleeds easily. Psychiatric/Behavioral:  Positive for sleep disturbance. Negative for confusion. The patient is not nervous/anxious. PHYSICAL EXAMINATION: Vital signs reviewed per the nursing documentation. /72 (Site: Left Upper Arm, Position: Sitting)   Ht 4' 11\" (1.499 m)   Wt 123 lb 6.4 oz (56 kg)   BMI 24.92 kg/m²   Body mass index is 24.92 kg/m². Physical Exam    Physical Exam   Constitutional: Patient is oriented to person, place, and time. Patient appears well-developed and well-nourished. HENT:   Head: Normocephalic and atraumatic. Eyes: Pupils are equal, round, and reactive to light. EOM are normal.   Neck: Normal range of motion. Neck supple. No JVD present. No tracheal deviation present. No thyromegaly present.    Cardiovascular: Normal rate, regular rhythm, normal heart sounds and intact distal pulses. Pulmonary/Chest: Effort normal and breath sounds normal. No stridor. No respiratory distress. He has no wheezes. He has no rales. He exhibits no tenderness. Abdominal: Soft. Bowel sounds are normal. He exhibits no distension and no mass. There is no tenderness. There is no rebound and no guarding. No hernia. Musculoskeletal: Normal range of motion. Lymphadenopathy:    Patient has no cervical adenopathy. Neurological: Patient is alert and oriented to person, place, and time. Psychiatric: Patient has a normal mood and affect. Patient behavior is normal.       LABORATORY DATA: Reviewed  Lab Results   Component Value Date    WBC 5.7 07/01/2022    HGB 11.7 (L) 07/01/2022    HCT 36.1 (L) 07/01/2022    .6 (H) 07/01/2022     07/01/2022     07/01/2022    K 4.0 07/01/2022     07/01/2022    CO2 25 07/01/2022    BUN 7 (L) 07/01/2022    CREATININE 0.71 07/01/2022    LABALBU 3.7 07/01/2022    BILITOT 0.51 07/01/2022    ALKPHOS 146 (H) 07/01/2022    AST 68 (H) 07/01/2022    ALT 49 (H) 07/01/2022         Lab Results   Component Value Date    RBC 3.45 (L) 07/01/2022    HGB 11.7 (L) 07/01/2022    .6 (H) 07/01/2022    MCH 33.9 (H) 07/01/2022    MCHC 32.4 07/01/2022    RDW 12.1 07/01/2022    MPV 10.0 07/01/2022    BASOPCT 1 07/01/2022    LYMPHSABS 1.27 07/01/2022    MONOSABS 0.57 07/01/2022    NEUTROABS 3.50 07/01/2022    EOSABS 0.31 07/01/2022    BASOSABS 0.06 07/01/2022         DIAGNOSTIC TESTING:     No results found. IMPRESSION: Ms.Katsuko Mitchell is a 81 y.o. female with a past history remarkable for anxiety and depression, vitiligo, chronic GERD, referred for evaluation of chronic hepatitis C with antibodies being positive. Treatment naïve. Noncirrhotic. Route of transmission not known. Assessment  1. Chronic hepatitis C without hepatic coma ([facility] [address]    2. Encounter for screening for other viral diseases     3.  Encounter for observation for other suspected diseases and conditions ruled out         Maged Beasley was seen today for new patient. Diagnoses and all orders for this visit:    Chronic hepatitis C without hepatic coma (HCC)-we will further characterize the patient's hepatitis C status, and plan for DAA in the future. Candidacy for treatment to be determined based on pending test.  -     CBC with Auto Differential; Future  -     Comprehensive Metabolic Panel with Bilirubin; Future  -     Liver Fibrosis, Chronic Viral Hepatitis; Future  -     Hepatitis A Antibody, Total; Future  -     Hepatitis B Surface Antibody; Future  -     Hepatitis C Antibody; Future  -     Hepatitis C Genotype; Future  -     Hepatitis C RNA, quantitative, PCR; Future  -     Urine Drug Screen; Future  -     Ethanol; Future  -     HIV Screen; Future  -     Hepatitis B Core Antibody, Total; Future  -     Vitamin B12 & Folate; Future    Encounter for screening for other viral diseases   -     Hepatitis B Surface Antibody; Future  -     Hepatitis B Core Antibody, Total; Future    Encounter for observation for other suspected diseases and conditions ruled out   -     Urine Drug Screen; Future           RTC: 3 months. Additional comments: Thank you for allowing me to participate in the care of Ms. Mitchell. For any further questions please do not hesitate to contact me. I have reviewed and agree with the MA/LPN ROS please refer to their documentation from today's encounter on a separate note. Lazarus Sayers MD, MPH   Board Certified in Gastroenterology  Board Certified in [address] [place] [address] #: [phone]          this note is created with the assistance of a speech recognition program.  While intending to generate a document that actually reflects the content of the visit, the document can still have some errors including those of syntax and sound a like substitutions which may escape proof reading.   It such instances, actual meaning can be extrapolated by contextual diversion.

## 2022-09-30 ENCOUNTER — OFFICE VISIT (OUTPATIENT)
Dept: FAMILY MEDICINE CLINIC | Age: 82
End: 2022-09-30
Payer: MEDICARE

## 2022-09-30 ENCOUNTER — HOSPITAL ENCOUNTER (OUTPATIENT)
Dept: GENERAL RADIOLOGY | Age: 82
Discharge: HOME | End: 2022-10-02
Payer: MEDICARE

## 2022-09-30 ENCOUNTER — HOSPITAL ENCOUNTER (OUTPATIENT)
Age: 82
Discharge: HOME | End: 2022-10-02
Payer: MEDICARE

## 2022-09-30 VITALS
TEMPERATURE: 97.5 F | BODY MASS INDEX: 24.52 KG/M2 | HEART RATE: 103 BPM | DIASTOLIC BLOOD PRESSURE: 80 MMHG | WEIGHT: 121.4 LBS | SYSTOLIC BLOOD PRESSURE: 132 MMHG | OXYGEN SATURATION: 98 %

## 2022-09-30 DIAGNOSIS — Z23 IMMUNIZATION DUE: ICD-10-CM

## 2022-09-30 DIAGNOSIS — M54.6 ACUTE LEFT-SIDED THORACIC BACK PAIN: ICD-10-CM

## 2022-09-30 DIAGNOSIS — M54.6 ACUTE LEFT-SIDED THORACIC BACK PAIN: Primary | ICD-10-CM

## 2022-09-30 PROCEDURE — 99214 OFFICE O/P EST MOD 30 MIN: CPT | Performed by: NURSE PRACTITIONER

## 2022-09-30 PROCEDURE — 72072 X-RAY EXAM THORAC SPINE 3VWS: CPT

## 2022-09-30 PROCEDURE — 72100 X-RAY EXAM L-S SPINE 2/3 VWS: CPT

## 2022-09-30 PROCEDURE — 1036F TOBACCO NON-USER: CPT | Performed by: NURSE PRACTITIONER

## 2022-09-30 PROCEDURE — G8399 PT W/DXA RESULTS DOCUMENT: HCPCS | Performed by: NURSE PRACTITIONER

## 2022-09-30 PROCEDURE — G0008 ADMIN INFLUENZA VIRUS VAC: HCPCS | Performed by: NURSE PRACTITIONER

## 2022-09-30 PROCEDURE — 90694 VACC AIIV4 NO PRSRV 0.5ML IM: CPT | Performed by: NURSE PRACTITIONER

## 2022-09-30 PROCEDURE — G8427 DOCREV CUR MEDS BY ELIG CLIN: HCPCS | Performed by: NURSE PRACTITIONER

## 2022-09-30 PROCEDURE — G8420 CALC BMI NORM PARAMETERS: HCPCS | Performed by: NURSE PRACTITIONER

## 2022-09-30 PROCEDURE — 1090F PRES/ABSN URINE INCON ASSESS: CPT | Performed by: NURSE PRACTITIONER

## 2022-09-30 PROCEDURE — 1123F ACP DISCUSS/DSCN MKR DOCD: CPT | Performed by: NURSE PRACTITIONER

## 2022-09-30 RX ORDER — BACLOFEN 5 MG/1
5 TABLET ORAL 3 TIMES DAILY PRN
Qty: 30 TABLET | Refills: 0 | Status: SHIPPED | OUTPATIENT
Start: 2022-09-30 | End: 2022-10-06 | Stop reason: SDUPTHER

## 2022-09-30 ASSESSMENT — ENCOUNTER SYMPTOMS
VOMITING: 0
BOWEL INCONTINENCE: 0
DIARRHEA: 0
ALLERGIC/IMMUNOLOGIC NEGATIVE: 1
COLOR CHANGE: 0
RESPIRATORY NEGATIVE: 1
ABDOMINAL PAIN: 0
GASTROINTESTINAL NEGATIVE: 1
NAUSEA: 0
EYES NEGATIVE: 1
CONSTIPATION: 0
BACK PAIN: 1

## 2022-09-30 NOTE — PATIENT INSTRUCTIONS
[facility] at [address], [address], [address] [facility]  [phone]  Please call within 48 hours to schedule your appointment.

## 2022-09-30 NOTE — PROGRESS NOTES
[facility]  [address]  Dept: [address] is a 81 y.o. female who presents today for her medical conditions/complaintsas noted below. Alirio Parra is here today c/o Back Pain (Left sided. Started after lifting mattress while making the bed X 1 month) and Flu Vaccine    Past Medical History:   Diagnosis Date    Acute hepatitis C virus infection without hepatic coma 2022    Anxiety and depression     Colitis     w/ inpatient colonscopy polyp removal    GERD (gastroesophageal reflux disease)     HTN (hypertension) 2014    Restless legs 2020      Past Surgical History:   Procedure Laterality Date    APPENDECTOMY      HYSTERECTOMY (CERVIX STATUS UNKNOWN)         Family History   Problem Relation Age of Onset    Heart Disease Father        Social History     Tobacco Use    Smoking status: Former     Packs/day: 0.25     Years: 30.00     Pack years: 7.50     Types: Cigarettes     Quit date: 1980     Years since quittin.7    Smokeless tobacco: Never   Substance Use Topics    Alcohol use: Yes     Comment: socially       Current Outpatient Medications   Medication Sig Dispense Refill    lisinopril (PRINIVIL;ZESTRIL) 40 MG tablet Take 1 tablet by mouth in the morning.  90 tablet 2    mirtazapine (REMERON) 15 MG tablet Take 0.5 tablets by mouth nightly 90 tablet 1    naproxen (NAPROSYN) 500 MG tablet Take 1 tablet by mouth 2 times daily (with meals) for 14 days 28 tablet 0    amLODIPine (NORVASC) 5 MG tablet Take 1 tablet by mouth daily 90 tablet 1    omeprazole (PRILOSEC) 40 MG delayed release capsule Take 1 capsule by mouth Daily 90 capsule 3    diclofenac sodium (VOLTAREN) 1 % GEL Apply 2-4 g topically 4 times daily as needed for Pain 150 g 1    Multiple Vitamins-Minerals (MULTIVITAMIN ADULT) CHEW Take 1 tablet by mouth daily 1 tablet 0    venlafaxine (EFFEXOR XR) 37.5 MG extended release capsule Take 1 capsule by mouth 2 times daily 180 capsule 1    ondansetron (ZOFRAN) 4 MG tablet Take 1 tablet by mouth daily as needed for Nausea or Vomiting (Patient not taking: Reported on 9/1/2022) 30 tablet 0     No current facility-administered medications for this visit. No Known Allergies      HPI:     Back Pain  This is a new problem. The current episode started more than 1 month ago (1 month after lifting mattress while making the bed). The problem occurs daily. The problem has been waxing and waning since onset. The pain is present in the lumbar spine and thoracic spine (L lower back). The quality of the pain is described as aching. The pain does not radiate. The pain is at a severity of 7/10. The pain is moderate. The symptoms are aggravated by lying down and position. Pertinent negatives include no abdominal pain, bladder incontinence, bowel incontinence, chest pain, dysuria, fever, headaches, leg pain, numbness, paresis, paresthesias, pelvic pain, perianal numbness, tingling or weakness. Treatments tried: Bengay, Ibuprofen 600 mg PRN. The treatment provided no relief. Health Maintenance:      Subjective:     Review of Systems   Constitutional: Negative. Negative for appetite change, chills, diaphoresis, fatigue, fever and unexpected weight change. HENT: Negative. Eyes: Negative. Respiratory: Negative. Cardiovascular:  Negative for chest pain. Gastrointestinal: Negative. Negative for abdominal pain, bowel incontinence, constipation, diarrhea, nausea and vomiting. Endocrine: Negative. Genitourinary: Negative. Negative for bladder incontinence, difficulty urinating, dysuria and pelvic pain. Musculoskeletal:  Positive for back pain. Skin: Negative. Negative for color change, pallor, rash and wound. Allergic/Immunologic: Negative. Neurological: Negative. Negative for tingling, seizures, facial asymmetry, speech difficulty, weakness, numbness, headaches and paresthesias. Hematological: Negative. Psychiatric/Behavioral: Negative. Negative for dysphoric mood. The patient is not nervous/anxious. Objective:     Vitals:    09/30/22 1144   BP: 132/80   Pulse: (!) 103   Temp: 97.5 °F (36.4 °C)   SpO2: 98%       Body mass index is 24.52 kg/m². Physical Exam  Constitutional:       General: She is not in acute distress. Appearance: Normal appearance. She is well-developed and normal weight. She is not ill-appearing, toxic-appearing or diaphoretic. HENT:      Head: Normocephalic and atraumatic. Right Ear: External ear normal.      Left Ear: External ear normal.      Nose: Nose normal.   Eyes:      General: No scleral icterus. Right eye: No discharge. Left eye: No discharge. Conjunctiva/sclera: Conjunctivae normal.      Pupils: Pupils are equal, round, and reactive to light. Neck:      Trachea: No tracheal deviation. Cardiovascular:      Rate and Rhythm: Normal rate and regular rhythm. Heart sounds: Normal heart sounds. No murmur heard. No friction rub. No gallop. Pulmonary:      Effort: Pulmonary effort is normal. No tachypnea, accessory muscle usage or respiratory distress. Breath sounds: Normal breath sounds. No stridor. No decreased breath sounds, wheezing, rhonchi or rales. Abdominal:      Palpations: Abdomen is soft. Musculoskeletal:         General: No deformity. Cervical back: Normal range of motion and neck supple. Thoracic back: Tenderness present. No swelling, edema, deformity, signs of trauma, lacerations, spasms or bony tenderness. Decreased range of motion. Lumbar back: No swelling, deformity, lacerations, tenderness or bony tenderness. Comments: Examination limited due to inability to get on examination table    Skin:     General: Skin is warm and dry. Coloration: Skin is not pale. Findings: No erythema or rash. Neurological:      Mental Status: She is alert and oriented to person, place, and time. GCS: GCS eye subscore is 4. GCS verbal subscore is 5. GCS motor subscore is 6. Sensory: Sensation is intact. Motor: Motor function is intact. Gait: Gait abnormal.   Psychiatric:         Speech: Speech normal.         Behavior: Behavior normal.         Thought Content: Thought content normal.         Judgment: Judgment normal.         Assessment:         1. Acute left-sided thoracic back pain    2. Immunization due        Plan:     1. Acute left-sided thoracic back pain    - Ambulatory referral to Physical Therapy  - XR LUMBAR SPINE (2-3 VIEWS); Future  - XR THORACIC SPINE (3 VIEWS); Future  - Baclofen (LIORESAL) 5 MG tablet; Take 1 tablet by mouth 3 times daily as needed (muscle spasms)  Dispense: 30 tablet; Refill: 0    Imaging as ordered  Recommended NSAID & Baclofen PRN  Heat & Topicals as needed   PT referral  Follow-up after PT if sx persist  No red flag s/s   Hepatic dosing reviewed     2. Immunization due    - Influenza, FLUAD, (age 72 y+), IM, Preservative Free, 0.5 mL@        Discussed use, benefit, and side effects of prescribed medications. All patient questions answered. Pt voiced understanding. Reviewed health maintenance. Instructed to continue current medications, diet and exercise. Patient agreedwith treatment plan. Follow up as directed.      Electronically signed by PABLITO Eduardo CNP on 9/30/2022

## 2022-10-03 DIAGNOSIS — S22.080A COMPRESSION FRACTURE OF T12 VERTEBRA, INITIAL ENCOUNTER (HCC): Primary | ICD-10-CM

## 2022-10-04 ENCOUNTER — TELEPHONE (OUTPATIENT)
Dept: GASTROENTEROLOGY | Age: 82
End: 2022-10-04

## 2022-10-04 NOTE — TELEPHONE ENCOUNTER
Patient's granddaughter called to see if you seen patient's labs that were done? Wanted to know what the next step in her care is. Karen Baker left her phone number as well.   [phone]

## 2022-10-06 DIAGNOSIS — M54.6 ACUTE LEFT-SIDED THORACIC BACK PAIN: ICD-10-CM

## 2022-10-06 RX ORDER — BACLOFEN 5 MG/1
5 TABLET ORAL 3 TIMES DAILY PRN
Qty: 30 TABLET | Refills: 0 | Status: SHIPPED | OUTPATIENT
Start: 2022-10-06

## 2022-10-06 RX ORDER — BACLOFEN 5 MG/1
TABLET ORAL
Qty: 30 TABLET | Refills: 0 | OUTPATIENT
Start: 2022-10-06

## 2022-10-06 NOTE — TELEPHONE ENCOUNTER
Anastasiya Ruelas is calling to request a refill on the following medication(s):    Medication Request:  Requested Prescriptions     Pending Prescriptions Disp Refills    Baclofen (LIORESAL) 5 MG tablet [Pharmacy Med Name: BACLOFEN 5 MG TABLET] 30 tablet 0     Sig: TAKE ONE TABLET BY MOUTH THREE TIMES A DAY AS NEEDED FOR MUSCLE SPASMS       Last Visit Date (If Applicable):  5/73/5022    Next Visit Date:    10/28/2022

## 2022-10-16 DIAGNOSIS — M54.6 ACUTE LEFT-SIDED THORACIC BACK PAIN: ICD-10-CM

## 2022-10-17 RX ORDER — BACLOFEN 5 MG/1
TABLET ORAL
Qty: 30 TABLET | Refills: 0 | OUTPATIENT
Start: 2022-10-17

## 2022-10-21 ENCOUNTER — TELEPHONE (OUTPATIENT)
Dept: FAMILY MEDICINE CLINIC | Age: 82
End: 2022-10-21

## 2022-10-21 DIAGNOSIS — E87.6 HYPOKALEMIA: Primary | ICD-10-CM

## 2022-10-21 RX ORDER — POTASSIUM CHLORIDE 20 MEQ/1
20 TABLET, EXTENDED RELEASE ORAL DAILY
Qty: 7 TABLET | Refills: 0 | Status: SHIPPED
Start: 2022-10-21 | End: 2022-10-28

## 2022-10-21 NOTE — TELEPHONE ENCOUNTER
If they aren't planning on replacing it for her I have sent RX to her pharmacy  Repeat K in 1 week (orders placed)

## 2022-10-21 NOTE — TELEPHONE ENCOUNTER
Ondina,       Potassium 3.1  Surgery 10/04/22    Heidi at Dr. Marge Garrido office saw pt 10/20/22 patient had NO complaints at all.     Lauren Higuera has tried to reach pt today but no answer    Aime Pablo

## 2022-10-24 LAB — POTASSIUM (K+): 3.4

## 2022-10-26 NOTE — TELEPHONE ENCOUNTER
Writer spoke with [employer] Granddaughter, she is going to contact the lab so we can get remaining labs, gave office fax number .

## 2022-10-27 ENCOUNTER — TELEPHONE (OUTPATIENT)
Dept: GASTROENTEROLOGY | Age: 82
End: 2022-10-27

## 2022-10-27 NOTE — TELEPHONE ENCOUNTER
Pt granddaughter called to notify the provider that the [facility] stated they faxed over her lab results on 10/26/22

## 2022-10-28 ENCOUNTER — HOSPITAL ENCOUNTER (OUTPATIENT)
Age: 82
Setting detail: SPECIMEN
Discharge: HOME | End: 2022-10-28

## 2022-10-28 ENCOUNTER — OFFICE VISIT (OUTPATIENT)
Dept: FAMILY MEDICINE CLINIC | Age: 82
End: 2022-10-28
Payer: MEDICARE

## 2022-10-28 VITALS
BODY MASS INDEX: 24.24 KG/M2 | SYSTOLIC BLOOD PRESSURE: 120 MMHG | WEIGHT: 120 LBS | DIASTOLIC BLOOD PRESSURE: 82 MMHG | OXYGEN SATURATION: 96 % | HEART RATE: 96 BPM | TEMPERATURE: 97.1 F

## 2022-10-28 DIAGNOSIS — E87.6 HYPOKALEMIA: ICD-10-CM

## 2022-10-28 DIAGNOSIS — R26.2 DIFFICULTY IN WALKING: ICD-10-CM

## 2022-10-28 DIAGNOSIS — S22.080S COMPRESSION FRACTURE OF T12 VERTEBRA, SEQUELA: ICD-10-CM

## 2022-10-28 DIAGNOSIS — Z91.81 AT RISK FOR FALLS: ICD-10-CM

## 2022-10-28 DIAGNOSIS — I10 PRIMARY HYPERTENSION: ICD-10-CM

## 2022-10-28 DIAGNOSIS — S32.040S COMPRESSION FRACTURE OF L4 VERTEBRA, SEQUELA: Primary | ICD-10-CM

## 2022-10-28 DIAGNOSIS — M80.08XA AGE-RELATED OSTEOPOROSIS WITH CURRENT PATHOLOGICAL FRACTURE, VERTEBRA(E), INITIAL ENCOUNTER FOR FRACTURE (HCC): ICD-10-CM

## 2022-10-28 DIAGNOSIS — M80.00XS AGE-RELATED OSTEOPOROSIS WITH CURRENT PATHOLOGICAL FRACTURE, SEQUELA: ICD-10-CM

## 2022-10-28 PROBLEM — M80.00XA AGE-RELATED OSTEOPOROSIS WITH CURRENT PATHOLOGICAL FRACTURE: Status: ACTIVE | Noted: 2022-10-28

## 2022-10-28 LAB
ANION GAP SERPL CALCULATED.3IONS-SCNC: 16 MMOL/L (ref 9–17)
BUN BLDV-MCNC: 9 MG/DL (ref 8–23)
CALCIUM SERPL-MCNC: 9.3 MG/DL (ref 8.6–10.4)
CHLORIDE BLD-SCNC: 106 MMOL/L (ref 98–107)
CO2: 21 MMOL/L (ref 20–31)
CREAT SERPL-MCNC: 0.66 MG/DL (ref 0.5–0.9)
GFR SERPL CREATININE-BSD FRML MDRD: >60 ML/MIN/1.73M2
GLUCOSE BLD-MCNC: 89 MG/DL (ref 70–99)
POTASSIUM SERPL-SCNC: 5 MMOL/L (ref 3.7–5.3)
SODIUM BLD-SCNC: 143 MMOL/L (ref 135–144)

## 2022-10-28 PROCEDURE — 99214 OFFICE O/P EST MOD 30 MIN: CPT | Performed by: NURSE PRACTITIONER

## 2022-10-28 PROCEDURE — G8427 DOCREV CUR MEDS BY ELIG CLIN: HCPCS | Performed by: NURSE PRACTITIONER

## 2022-10-28 PROCEDURE — 1036F TOBACCO NON-USER: CPT | Performed by: NURSE PRACTITIONER

## 2022-10-28 PROCEDURE — 1090F PRES/ABSN URINE INCON ASSESS: CPT | Performed by: NURSE PRACTITIONER

## 2022-10-28 PROCEDURE — 3074F SYST BP LT 130 MM HG: CPT | Performed by: NURSE PRACTITIONER

## 2022-10-28 PROCEDURE — G8399 PT W/DXA RESULTS DOCUMENT: HCPCS | Performed by: NURSE PRACTITIONER

## 2022-10-28 PROCEDURE — 1123F ACP DISCUSS/DSCN MKR DOCD: CPT | Performed by: NURSE PRACTITIONER

## 2022-10-28 PROCEDURE — G8484 FLU IMMUNIZE NO ADMIN: HCPCS | Performed by: NURSE PRACTITIONER

## 2022-10-28 PROCEDURE — 3078F DIAST BP <80 MM HG: CPT | Performed by: NURSE PRACTITIONER

## 2022-10-28 PROCEDURE — G8420 CALC BMI NORM PARAMETERS: HCPCS | Performed by: NURSE PRACTITIONER

## 2022-10-28 RX ORDER — TRAMADOL HYDROCHLORIDE 50 MG/1
TABLET ORAL PRN
COMMUNITY
Start: 2022-10-24

## 2022-10-28 ASSESSMENT — ENCOUNTER SYMPTOMS
RESPIRATORY NEGATIVE: 1
SHORTNESS OF BREATH: 0
NAUSEA: 0
DIARRHEA: 0
GASTROINTESTINAL NEGATIVE: 1
ALLERGIC/IMMUNOLOGIC NEGATIVE: 1
COLOR CHANGE: 0
VOMITING: 0
EYES NEGATIVE: 1
COUGH: 0
WHEEZING: 0
BACK PAIN: 1
CHEST TIGHTNESS: 0

## 2022-10-28 NOTE — PATIENT INSTRUCTIONS
WHO FRAX risk assessment tool    Alendronate = Fosamax  Risedronate = Actonel  Zoledronic acid = Reclast (IV infusion yearly)  Ibandronate = Boniva    Denosumab = Prolia (SQ injection every 6 months)    Teriparatide = Forteo (SQ injection daily for 2 years only)    Raloxifene = Evista    Information sourced from [facility]:  Supplemental Calcium or Vitamin D Doesnt Lower Fracture Risk in Community-Dwelling Adults  Guidelines that recommend supplementation for older adults living in institutions should not be applied to adults living independently. Several guidelines recommend calcium, vitamin D, or both types of supplementation to prevent fractures in older adults (focusing mostly on frail elders and those living in institutions; [place] Int 2010; 65:2564). In this meta-analysis, researchers identified 33 randomized controlled trials that involved community-dwelling adults (age, ?48; total patients, 51,145). The trials comprised 44 separate studies (14 of calcium supplementation alone, 17 of vitamin D supplementation alone, and 13 of combined supplementation). Studies that involved patients with corticosteroid-induced osteoporosis, and those in which other drugs or dietary interventions were combined with vitamin D or calcium supplementation were excluded. The pooled analyses for all three study clusters showed no benefit for any supplementation intervention in reducing relative risk for hip fracture (the primary outcome). Secondary outcomes (vertebral, nonvertebral, and total fractures) also were not affected by any of the interventions. The overall results applied to subgroups with 25-hydroxyvitamin D levels lower than 20 ng/mL or daily dietary calcium intake lower than 900 mg, to women, and to people with previous fractures.   COMMENT  A colleague of [facility] recently saw a healthy 78-year-old woman who was persistent in her desire for a vitamin D test. When the result came back at 13 ng/mL, she asked about supplementation. Her educational sophistication was such that she was given this meta-analysis and was told that no evidence supported supplementation but that the decision was hers. What she decided is not yet known. These results clearly show that recommendations for calcium, vitamin D, or combined supplementation in frail older adults or those living in institutions should not be applied to community-living adults. The difference in outcomes with supplementation between these groups might be explained by higher fracture risk in frail or institutionalized elders. Kelvin Moss MD reviewing Mansi Marcus al. BREANN 2017 Dec 26. CITATION(S):  Antonia DRUMMOND et al. Association between calcium or vitamin D supplementation and fracture incidence in community-dwelling older adults: A systematic review and meta-analysis. BREANN 2017 Dec 26; 318:2466.   [PubMed® abstract]                  The following is the website to get the FRAX score;    [email or web address]://www.incrediblue/           Fosamax & Prolia

## 2022-10-28 NOTE — PROGRESS NOTES
[facility]  [address] [facility]  Dept: [address] is a 81 y.o. female who presents today for her medical conditions/complaintsas noted below. Robert Cifuentes is here today c/o Hypertension and Orders (walker)    Past Medical History:   Diagnosis Date    Acute hepatitis C virus infection without hepatic coma 2022    Anxiety and depression     Colitis     w/ inpatient colonscopy polyp removal    GERD (gastroesophageal reflux disease)     HTN (hypertension) 2014    Restless legs 2020      Past Surgical History:   Procedure Laterality Date    APPENDECTOMY      HYSTERECTOMY (CERVIX STATUS UNKNOWN)         Family History   Problem Relation Age of Onset    Heart Disease Father        Social History     Tobacco Use    Smoking status: Former     Packs/day: 0.25     Years: 30.00     Pack years: 7.50     Types: Cigarettes     Quit date: 1980     Years since quittin.8    Smokeless tobacco: Never   Substance Use Topics    Alcohol use: Yes     Comment: socially       Current Outpatient Medications   Medication Sig Dispense Refill    traMADol (ULTRAM) 50 MG tablet as needed. potassium chloride (KLOR-CON M) 20 MEQ extended release tablet Take 1 tablet by mouth daily 7 tablet 0    Baclofen (LIORESAL) 5 MG tablet Take 1 tablet by mouth 3 times daily as needed (muscle spasms) 30 tablet 0    lisinopril (PRINIVIL;ZESTRIL) 40 MG tablet Take 1 tablet by mouth in the morning.  90 tablet 2    amLODIPine (NORVASC) 5 MG tablet Take 1 tablet by mouth daily 90 tablet 1    mirtazapine (REMERON) 15 MG tablet Take 0.5 tablets by mouth nightly 90 tablet 1    naproxen (NAPROSYN) 500 MG tablet Take 1 tablet by mouth 2 times daily (with meals) for 14 days 28 tablet 0    omeprazole (PRILOSEC) 40 MG delayed release capsule Take 1 capsule by mouth Daily 90 capsule 3    diclofenac sodium (VOLTAREN) 1 % GEL Apply 2-4 g topically 4 times daily as needed for Pain 150 g 1    Multiple Vitamins-Minerals (MULTIVITAMIN ADULT) CHEW Take 1 tablet by mouth daily 1 tablet 0    venlafaxine (EFFEXOR XR) 37.5 MG extended release capsule Take 1 capsule by mouth 2 times daily 180 capsule 1     No current facility-administered medications for this visit. No Known Allergies      HPI:     HPI    Presents today c/o follow-up HTN     S/p spinal fusian with Neurosurgery Dr. Parvez Aguilar h/o compression fracture   Currently taking Tramadol for pain     H/o HTN  Taking amlodipine & lisinopril    H/o hypokalemia of unknown cause  Noticed on pre op labs with repeat K of 3.4  Declines any N/V/D or diuretics or new medications     H/o depression   Taking Effexor & Remeron     H/o Hep C following with [facility]      Health Maintenance:      Subjective:     Review of Systems   Constitutional: Negative. Negative for appetite change, chills, diaphoresis, fatigue, fever and unexpected weight change. HENT: Negative. Eyes: Negative. Respiratory: Negative. Negative for cough, chest tightness, shortness of breath and wheezing. Cardiovascular: Negative. Negative for chest pain. Gastrointestinal: Negative. Negative for diarrhea, nausea and vomiting. Endocrine: Negative. Genitourinary: Negative. Negative for difficulty urinating. Musculoskeletal:  Positive for arthralgias, back pain and gait problem. Skin: Negative. Negative for color change, pallor, rash and wound. Allergic/Immunologic: Negative. Neurological:  Negative for dizziness, seizures, syncope, light-headedness and headaches. Hematological: Negative. Psychiatric/Behavioral: Negative. Negative for dysphoric mood. The patient is not nervous/anxious. Objective:     Vitals:    10/28/22 1533   BP: 120/82   Pulse: 96   Temp: 97.1 °F (36.2 °C)   SpO2: 96%       Body mass index is 24.24 kg/m². Physical Exam  Constitutional:       General: She is not in acute distress. Appearance: Normal appearance.  She is REST WALKER) MISC; 1 each by Does not apply route continuous Seated, wheeled walker  Dispense: 1 each; Refill: 0    2. Compression fracture of T12 vertebra, sequela    - Misc. Devices (STEP N REST WALKER) MISC; 1 each by Does not apply route continuous Seated, wheeled walker  Dispense: 1 each; Refill: 0    S/p spinal fusion     3. Primary hypertension    BP controlled, continue meds   Labs up to date and reviewed  Lifestyle modifications encouraged     4. Hypokalemia    - Basic Metabolic Panel; Future    Recheck labs, plan pending results     5. Age-related osteoporosis with current pathological fracture, sequela    Treatment options discussed  Encouraged adequate calcium & vitamin D intake   Handout given   Discussed osteoporosis treatment I.e. Fosamax versus Prolia, etc.   Patient / family will call with preference of treatment  Discussed DEXA which was ordered     6. Difficulty in walking    - Misc. Devices (STEP N REST WALKER) MISC; 1 each by Does not apply route continuous Seated, wheeled walker  Dispense: 1 each; Refill: 0    7. At risk for falls    - Misc. Devices (STEP N REST WALKER) MISC; 1 each by Does not apply route continuous Seated, wheeled walker  Dispense: 1 each; Refill: 0@        Discussed use, benefit, and side effects of prescribed medications. All patient questions answered. Pt voiced understanding. Reviewed health maintenance. Instructed to continue current medications, diet and exercise. Patient agreedwith treatment plan. Follow up as directed.      Electronically signed by PABLITO Donaldson CNP on 10/28/2022

## 2022-10-31 RX ORDER — VENLAFAXINE HYDROCHLORIDE 37.5 MG/1
CAPSULE, EXTENDED RELEASE ORAL
Qty: 180 CAPSULE | Refills: 1 | Status: SHIPPED | OUTPATIENT
Start: 2022-10-31

## 2022-10-31 NOTE — TELEPHONE ENCOUNTER
Antonella Jaramillo is calling to request a refill on the following medication(s):    Medication Request:  Requested Prescriptions     Pending Prescriptions Disp Refills    venlafaxine (EFFEXOR XR) 37.5 MG extended release capsule [Pharmacy Med Name: VENLAFAXINE HCL ER 37.5 MG CAP] 180 capsule 1     Sig: TAKE ONE CAPSULE BY MOUTH TWICE A DAY       Last Visit Date (If Applicable):  08/60/5567    Next Visit Date:    4/28/2023

## 2022-11-14 LAB
ANTIBODY: NORMAL
HIV AG/AB: NORMAL

## 2022-11-15 DIAGNOSIS — B18.2 CHRONIC HEPATITIS C WITHOUT HEPATIC COMA (HCC): ICD-10-CM

## 2022-12-27 ENCOUNTER — TELEPHONE (OUTPATIENT)
Dept: GASTROENTEROLOGY | Age: 82
End: 2022-12-27

## 2022-12-27 DIAGNOSIS — B18.2 CHRONIC HEPATITIS C WITHOUT HEPATIC COMA (HCC): ICD-10-CM

## 2022-12-27 DIAGNOSIS — Z11.59 ENCOUNTER FOR SCREENING FOR OTHER VIRAL DISEASES: ICD-10-CM

## 2022-12-27 NOTE — TELEPHONE ENCOUNTER
Patient's remaining labs have been scanned into media. I was able to get some into the lab orders, had some trouble with other ones. They are there for you to review.

## 2022-12-31 DIAGNOSIS — I10 PRIMARY HYPERTENSION: ICD-10-CM

## 2023-01-03 RX ORDER — AMLODIPINE BESYLATE 5 MG/1
TABLET ORAL
Qty: 90 TABLET | Refills: 2 | Status: ON HOLD
Start: 2023-01-03 | End: 2023-02-25 | Stop reason: HOSPADM

## 2023-01-03 NOTE — TELEPHONE ENCOUNTER
Anshul Muhammad is calling to request a refill on the following medication(s):    Medication Request:  Requested Prescriptions     Pending Prescriptions Disp Refills    amLODIPine (NORVASC) 5 MG tablet [Pharmacy Med Name: amLODIPine BESYLATE 5 MG TAB] 90 tablet 1     Sig: TAKE ONE TABLET BY MOUTH DAILY       Last Visit Date (If Applicable):  63/19/5897    Next Visit Date:    4/28/2023

## 2023-02-18 ENCOUNTER — APPOINTMENT (OUTPATIENT)
Dept: MRI IMAGING | Age: 83
End: 2023-02-18
Payer: MEDICARE

## 2023-02-18 ENCOUNTER — APPOINTMENT (OUTPATIENT)
Dept: CT IMAGING | Age: 83
End: 2023-02-18
Payer: MEDICARE

## 2023-02-18 ENCOUNTER — APPOINTMENT (OUTPATIENT)
Dept: GENERAL RADIOLOGY | Age: 83
End: 2023-02-18
Payer: MEDICARE

## 2023-02-18 ENCOUNTER — HOSPITAL ENCOUNTER (INPATIENT)
Age: 83
LOS: 7 days | Discharge: HOME | End: 2023-02-25
Attending: EMERGENCY MEDICINE | Admitting: INTERNAL MEDICINE
Payer: MEDICARE

## 2023-02-18 DIAGNOSIS — S32.000A COMPRESSION FX, LUMBAR SPINE, CLOSED, INITIAL ENCOUNTER (HCC): ICD-10-CM

## 2023-02-18 DIAGNOSIS — M48.061 SPINAL STENOSIS OF LUMBAR REGION, UNSPECIFIED WHETHER NEUROGENIC CLAUDICATION PRESENT: ICD-10-CM

## 2023-02-18 DIAGNOSIS — M54.6 ACUTE LEFT-SIDED THORACIC BACK PAIN: ICD-10-CM

## 2023-02-18 DIAGNOSIS — S22.079A CLOSED FRACTURE OF TENTH THORACIC VERTEBRA, UNSPECIFIED FRACTURE MORPHOLOGY, INITIAL ENCOUNTER (HCC): Primary | ICD-10-CM

## 2023-02-18 DIAGNOSIS — E87.6 HYPOKALEMIA: ICD-10-CM

## 2023-02-18 DIAGNOSIS — S32.038A OTHER CLOSED FRACTURE OF THIRD LUMBAR VERTEBRA, INITIAL ENCOUNTER (HCC): ICD-10-CM

## 2023-02-18 DIAGNOSIS — R77.8 ELEVATED TROPONIN: ICD-10-CM

## 2023-02-18 PROBLEM — S22.070A CLOSED WEDGE COMPRESSION FRACTURE OF T10 VERTEBRA (HCC): Status: ACTIVE | Noted: 2023-02-18

## 2023-02-18 PROBLEM — R79.89 TROPONIN LEVEL ELEVATED: Status: ACTIVE | Noted: 2023-02-18

## 2023-02-18 LAB
ABSOLUTE EOS #: 0.49 K/UL (ref 0–0.44)
ABSOLUTE IMMATURE GRANULOCYTE: <0.03 K/UL (ref 0–0.3)
ABSOLUTE LYMPH #: 0.97 K/UL (ref 1.1–3.7)
ABSOLUTE MONO #: 0.36 K/UL (ref 0.1–1.2)
ALBUMIN SERPL-MCNC: 3.5 G/DL (ref 3.5–5.2)
ALBUMIN/GLOBULIN RATIO: 1.3 (ref 1–2.5)
ALP SERPL-CCNC: 151 U/L (ref 35–104)
ALT SERPL-CCNC: 15 U/L (ref 5–33)
ANION GAP SERPL CALCULATED.3IONS-SCNC: 9 MMOL/L (ref 9–17)
AST SERPL-CCNC: 30 U/L
BASOPHILS # BLD: 1 % (ref 0–2)
BASOPHILS ABSOLUTE: 0.04 K/UL (ref 0–0.2)
BILIRUB SERPL-MCNC: 0.7 MG/DL (ref 0.3–1.2)
BILIRUBIN URINE: NEGATIVE
BUN SERPL-MCNC: 5 MG/DL (ref 8–23)
CALCIUM SERPL-MCNC: 8.6 MG/DL (ref 8.6–10.4)
CHLORIDE SERPL-SCNC: 104 MMOL/L (ref 98–107)
CO2 SERPL-SCNC: 27 MMOL/L (ref 20–31)
COLOR: YELLOW
COMMENT UA: NORMAL
CREAT SERPL-MCNC: 0.49 MG/DL (ref 0.5–0.9)
EOSINOPHILS RELATIVE PERCENT: 12 % (ref 1–4)
GFR SERPL CREATININE-BSD FRML MDRD: >60 ML/MIN/1.73M2
GLUCOSE SERPL-MCNC: 121 MG/DL (ref 70–99)
GLUCOSE UR STRIP.AUTO-MCNC: NEGATIVE MG/DL
HCT VFR BLD AUTO: 31.8 % (ref 36.3–47.1)
HGB BLD-MCNC: 11 G/DL (ref 11.9–15.1)
IMMATURE GRANULOCYTES: 1 %
KETONES UR STRIP.AUTO-MCNC: NEGATIVE MG/DL
LEUKOCYTE ESTERASE UR QL STRIP.AUTO: NEGATIVE
LYMPHOCYTES # BLD: 24 % (ref 24–43)
MAGNESIUM SERPL-MCNC: 1.8 MG/DL (ref 1.6–2.6)
MCH RBC QN AUTO: 33.3 PG (ref 25.2–33.5)
MCHC RBC AUTO-ENTMCNC: 34.6 G/DL (ref 28.4–34.8)
MCV RBC AUTO: 96.4 FL (ref 82.6–102.9)
MONOCYTES # BLD: 9 % (ref 3–12)
NITRITE UR QL STRIP.AUTO: NEGATIVE
NRBC AUTOMATED: 0 PER 100 WBC
PDW BLD-RTO: 12.8 % (ref 11.8–14.4)
PLATELET # BLD AUTO: 169 K/UL (ref 138–453)
PMV BLD AUTO: 9.9 FL (ref 8.1–13.5)
POTASSIUM SERPL-SCNC: 2.8 MMOL/L (ref 3.7–5.3)
PROT SERPL-MCNC: 6.1 G/DL (ref 6.4–8.3)
PROT UR STRIP.AUTO-MCNC: 7.5 MG/DL (ref 5–8)
PROT UR STRIP.AUTO-MCNC: NEGATIVE MG/DL
RBC # BLD: 3.3 M/UL (ref 3.95–5.11)
SEG NEUTROPHILS: 53 % (ref 36–65)
SEGMENTED NEUTROPHILS ABSOLUTE COUNT: 2.24 K/UL (ref 1.5–8.1)
SODIUM SERPL-SCNC: 140 MMOL/L (ref 135–144)
SPECIFIC GRAVITY UA: 1.01 (ref 1–1.03)
TROPONIN I SERPL DL<=0.01 NG/ML-MCNC: 19 NG/L (ref 0–14)
TROPONIN I SERPL DL<=0.01 NG/ML-MCNC: 19 NG/L (ref 0–14)
TURBIDITY: CLEAR
URINE HGB: NEGATIVE
UROBILINOGEN, URINE: NORMAL
WBC # BLD AUTO: 4.1 K/UL (ref 3.5–11.3)

## 2023-02-18 PROCEDURE — 72146 MRI CHEST SPINE W/O DYE: CPT

## 2023-02-18 PROCEDURE — 72128 CT CHEST SPINE W/O DYE: CPT

## 2023-02-18 PROCEDURE — 99222 1ST HOSP IP/OBS MODERATE 55: CPT | Performed by: INTERNAL MEDICINE

## 2023-02-18 PROCEDURE — 83735 ASSAY OF MAGNESIUM: CPT

## 2023-02-18 PROCEDURE — 96374 THER/PROPH/DIAG INJ IV PUSH: CPT

## 2023-02-18 PROCEDURE — 96375 TX/PRO/DX INJ NEW DRUG ADDON: CPT

## 2023-02-18 PROCEDURE — 6360000002 HC RX W HCPCS: Performed by: STUDENT IN AN ORGANIZED HEALTH CARE EDUCATION/TRAINING PROGRAM

## 2023-02-18 PROCEDURE — 72148 MRI LUMBAR SPINE W/O DYE: CPT

## 2023-02-18 PROCEDURE — 85025 COMPLETE CBC W/AUTO DIFF WBC: CPT

## 2023-02-18 PROCEDURE — 80053 COMPREHEN METABOLIC PANEL: CPT

## 2023-02-18 PROCEDURE — 71045 X-RAY EXAM CHEST 1 VIEW: CPT

## 2023-02-18 PROCEDURE — 72131 CT LUMBAR SPINE W/O DYE: CPT

## 2023-02-18 PROCEDURE — 1200000000 HC SEMI PRIVATE

## 2023-02-18 PROCEDURE — 6370000000 HC RX 637 (ALT 250 FOR IP): Performed by: INTERNAL MEDICINE

## 2023-02-18 PROCEDURE — 81003 URINALYSIS AUTO W/O SCOPE: CPT

## 2023-02-18 PROCEDURE — 93005 ELECTROCARDIOGRAM TRACING: CPT | Performed by: STUDENT IN AN ORGANIZED HEALTH CARE EDUCATION/TRAINING PROGRAM

## 2023-02-18 PROCEDURE — 6370000000 HC RX 637 (ALT 250 FOR IP): Performed by: STUDENT IN AN ORGANIZED HEALTH CARE EDUCATION/TRAINING PROGRAM

## 2023-02-18 PROCEDURE — 99285 EMERGENCY DEPT VISIT HI MDM: CPT

## 2023-02-18 PROCEDURE — 84484 ASSAY OF TROPONIN QUANT: CPT

## 2023-02-18 RX ORDER — ACETAMINOPHEN 650 MG/1
650 SUPPOSITORY RECTAL EVERY 6 HOURS PRN
Status: DISCONTINUED | OUTPATIENT
Start: 2023-02-18 | End: 2023-02-25 | Stop reason: HOSPADM

## 2023-02-18 RX ORDER — POTASSIUM CHLORIDE 7.45 MG/ML
10 INJECTION INTRAVENOUS PRN
Status: DISCONTINUED | OUTPATIENT
Start: 2023-02-18 | End: 2023-02-21 | Stop reason: SDUPTHER

## 2023-02-18 RX ORDER — ENOXAPARIN SODIUM 100 MG/ML
40 INJECTION SUBCUTANEOUS DAILY
Status: DISCONTINUED | OUTPATIENT
Start: 2023-02-18 | End: 2023-02-25 | Stop reason: HOSPADM

## 2023-02-18 RX ORDER — ACETAMINOPHEN 325 MG/1
650 TABLET ORAL EVERY 6 HOURS PRN
Status: DISCONTINUED | OUTPATIENT
Start: 2023-02-18 | End: 2023-02-25 | Stop reason: HOSPADM

## 2023-02-18 RX ORDER — VENLAFAXINE HYDROCHLORIDE 37.5 MG/1
37.5 CAPSULE, EXTENDED RELEASE ORAL 2 TIMES DAILY
Status: DISCONTINUED | OUTPATIENT
Start: 2023-02-18 | End: 2023-02-25 | Stop reason: HOSPADM

## 2023-02-18 RX ORDER — SODIUM CHLORIDE 9 MG/ML
INJECTION, SOLUTION INTRAVENOUS PRN
Status: DISCONTINUED | OUTPATIENT
Start: 2023-02-18 | End: 2023-02-25 | Stop reason: HOSPADM

## 2023-02-18 RX ORDER — SODIUM CHLORIDE 0.9 % (FLUSH) 0.9 %
5-40 SYRINGE (ML) INJECTION EVERY 12 HOURS SCHEDULED
Status: DISCONTINUED | OUTPATIENT
Start: 2023-02-18 | End: 2023-02-25 | Stop reason: HOSPADM

## 2023-02-18 RX ORDER — POTASSIUM CHLORIDE 7.45 MG/ML
10 INJECTION INTRAVENOUS
Status: COMPLETED | OUTPATIENT
Start: 2023-02-18 | End: 2023-02-18

## 2023-02-18 RX ORDER — MIRTAZAPINE 15 MG/1
7.5 TABLET, FILM COATED ORAL NIGHTLY
Status: DISCONTINUED | OUTPATIENT
Start: 2023-02-18 | End: 2023-02-25 | Stop reason: HOSPADM

## 2023-02-18 RX ORDER — M-VIT,TX,IRON,MINS/CALC/FOLIC 27MG-0.4MG
1 TABLET ORAL DAILY
Status: DISCONTINUED | OUTPATIENT
Start: 2023-02-18 | End: 2023-02-25 | Stop reason: HOSPADM

## 2023-02-18 RX ORDER — POTASSIUM CHLORIDE 20 MEQ/1
40 TABLET, EXTENDED RELEASE ORAL PRN
Status: DISCONTINUED | OUTPATIENT
Start: 2023-02-18 | End: 2023-02-25 | Stop reason: HOSPADM

## 2023-02-18 RX ORDER — POTASSIUM CHLORIDE 7.45 MG/ML
10 INJECTION INTRAVENOUS PRN
Status: DISCONTINUED | OUTPATIENT
Start: 2023-02-18 | End: 2023-02-25 | Stop reason: HOSPADM

## 2023-02-18 RX ORDER — BISACODYL 10 MG
10 SUPPOSITORY, RECTAL RECTAL DAILY PRN
Status: DISCONTINUED | OUTPATIENT
Start: 2023-02-18 | End: 2023-02-25 | Stop reason: HOSPADM

## 2023-02-18 RX ORDER — LISINOPRIL 20 MG/1
40 TABLET ORAL DAILY
Status: DISCONTINUED | OUTPATIENT
Start: 2023-02-18 | End: 2023-02-25

## 2023-02-18 RX ORDER — SODIUM CHLORIDE 0.9 % (FLUSH) 0.9 %
5-40 SYRINGE (ML) INJECTION PRN
Status: DISCONTINUED | OUTPATIENT
Start: 2023-02-18 | End: 2023-02-25 | Stop reason: HOSPADM

## 2023-02-18 RX ORDER — POTASSIUM CHLORIDE 20 MEQ/1
40 TABLET, EXTENDED RELEASE ORAL PRN
Status: DISCONTINUED | OUTPATIENT
Start: 2023-02-18 | End: 2023-02-21 | Stop reason: SDUPTHER

## 2023-02-18 RX ORDER — LORAZEPAM 0.5 MG/1
0.5 TABLET ORAL EVERY 6 HOURS PRN
Status: DISCONTINUED | OUTPATIENT
Start: 2023-02-18 | End: 2023-02-25 | Stop reason: HOSPADM

## 2023-02-18 RX ORDER — TRAMADOL HYDROCHLORIDE 50 MG/1
50 TABLET ORAL EVERY 6 HOURS PRN
Status: DISCONTINUED | OUTPATIENT
Start: 2023-02-18 | End: 2023-02-21

## 2023-02-18 RX ORDER — ONDANSETRON 4 MG/1
4 TABLET, ORALLY DISINTEGRATING ORAL EVERY 8 HOURS PRN
Status: DISCONTINUED | OUTPATIENT
Start: 2023-02-18 | End: 2023-02-25 | Stop reason: HOSPADM

## 2023-02-18 RX ORDER — BACLOFEN 5 MG/1
5 TABLET ORAL 3 TIMES DAILY PRN
Status: DISCONTINUED | OUTPATIENT
Start: 2023-02-18 | End: 2023-02-25 | Stop reason: HOSPADM

## 2023-02-18 RX ORDER — PANTOPRAZOLE SODIUM 40 MG/1
40 TABLET, DELAYED RELEASE ORAL
Status: DISCONTINUED | OUTPATIENT
Start: 2023-02-19 | End: 2023-02-25 | Stop reason: HOSPADM

## 2023-02-18 RX ORDER — AMLODIPINE BESYLATE 5 MG/1
5 TABLET ORAL DAILY
Status: DISCONTINUED | OUTPATIENT
Start: 2023-02-18 | End: 2023-02-25

## 2023-02-18 RX ORDER — POLYETHYLENE GLYCOL 3350 17 G/17G
17 POWDER, FOR SOLUTION ORAL DAILY PRN
Status: DISCONTINUED | OUTPATIENT
Start: 2023-02-18 | End: 2023-02-19

## 2023-02-18 RX ORDER — FENTANYL CITRATE 50 UG/ML
50 INJECTION, SOLUTION INTRAMUSCULAR; INTRAVENOUS ONCE
Status: COMPLETED | OUTPATIENT
Start: 2023-02-18 | End: 2023-02-18

## 2023-02-18 RX ORDER — ONDANSETRON 2 MG/ML
4 INJECTION INTRAMUSCULAR; INTRAVENOUS EVERY 6 HOURS PRN
Status: DISCONTINUED | OUTPATIENT
Start: 2023-02-18 | End: 2023-02-25 | Stop reason: HOSPADM

## 2023-02-18 RX ADMIN — MIRTAZAPINE 7.5 MG: 15 TABLET, FILM COATED ORAL at 22:02

## 2023-02-18 RX ADMIN — POTASSIUM BICARBONATE 40 MEQ: 782 TABLET, EFFERVESCENT ORAL at 14:07

## 2023-02-18 RX ADMIN — LORAZEPAM 0.5 MG: 0.5 TABLET ORAL at 15:39

## 2023-02-18 RX ADMIN — POTASSIUM CHLORIDE 10 MEQ: 7.46 INJECTION, SOLUTION INTRAVENOUS at 14:13

## 2023-02-18 RX ADMIN — POTASSIUM CHLORIDE 10 MEQ: 7.46 INJECTION, SOLUTION INTRAVENOUS at 15:36

## 2023-02-18 RX ADMIN — FENTANYL CITRATE 50 MCG: 50 INJECTION INTRAMUSCULAR; INTRAVENOUS at 12:14

## 2023-02-18 RX ADMIN — Medication 1 TABLET: at 22:02

## 2023-02-18 RX ADMIN — TRAMADOL HYDROCHLORIDE 50 MG: 50 TABLET, COATED ORAL at 18:26

## 2023-02-18 ASSESSMENT — ENCOUNTER SYMPTOMS
VOMITING: 0
NAUSEA: 0
BLOOD IN STOOL: 0
WHEEZING: 0
ABDOMINAL DISTENTION: 0
CONSTIPATION: 1
ABDOMINAL PAIN: 0
COUGH: 0
SHORTNESS OF BREATH: 0
PHOTOPHOBIA: 0
BACK PAIN: 1

## 2023-02-18 ASSESSMENT — PAIN SCALES - GENERAL
PAINLEVEL_OUTOF10: 7
PAINLEVEL_OUTOF10: 6
PAINLEVEL_OUTOF10: 10
PAINLEVEL_OUTOF10: 10

## 2023-02-18 ASSESSMENT — HEART SCORE: ECG: 0

## 2023-02-18 ASSESSMENT — PAIN - FUNCTIONAL ASSESSMENT: PAIN_FUNCTIONAL_ASSESSMENT: 0-10

## 2023-02-18 NOTE — ED PROVIDER NOTES
[facility] ED  Emergency Department Encounter  Emergency Medicine Resident     Pt Jarocho Greene  MRN: [id number]  [place] 1940  Date of evaluation: 2/18/23  PCP:  PBALITO Licona CNP  Note Started: 11:47 AM EST      CHIEF COMPLAINT       Chief Complaint   Patient presents with    Back Pain       HISTORY OF PRESENT ILLNESS  (Location/Symptom, Timing/Onset, Context/Setting, Quality, Duration, Modifying Factors, Severity.)      Jhon Hammond is a 81 y.o. female who presents with lower back pain for the past 2 weeks. Patient's grandchildren and son are in the room and provide additional history as patient is hard of hearing. Patient and patient's children report that she had a spinal fusion with Dr. Natasha Castorena at [facility] last October. Patient became involved with therapy and noticed an improvement in her back pain at that time. However for the past 2 weeks her  has been hospitalized and she had been sitting in a hard nonsupportive chair for several hours a day after which she experienced worsening upper back pain that has not been improved with physical therapy. Patient has been taking Motrin with minimal improvement in addition to tramadol which did not seem to help at all. Patient has a history of osteoporotic fracture without trauma in the past.  Patient denies any incontinence but her family reports that she has been avoiding using any laxatives for her normal constipation as it is painful for her to have to go to the restroom. Patient denies any numbness in the saddle region or in lower extremities. She reports that she feels like she is weak in her lower extremities because of the pain when she is walking. Her family reports that she is having difficulty transferring and getting around due to the pain. Patient normally uses a cane to ambulate.     PAST MEDICAL / SURGICAL / SOCIAL / FAMILY HISTORY      has a past medical history of Acute hepatitis C virus infection without hepatic coma, Anxiety and depression, Colitis, GERD (gastroesophageal reflux disease), HTN (hypertension), and Restless legs. has a past surgical history that includes Hysterectomy; Appendectomy; and Spinal fusion. Social History     Socioeconomic History    Marital status:      Spouse name: Not on file    Number of children: Not on file    Years of education: Not on file    Highest education level: Not on file   Occupational History    Not on file   Tobacco Use    Smoking status: Former     Packs/day: 0.25     Years: 30.00     Pack years: 7.50     Types: Cigarettes     Quit date: 1980     Years since quittin.1    Smokeless tobacco: Never   Substance and Sexual Activity    Alcohol use: Yes     Comment: socially     Drug use: No    Sexual activity: Not on file   Other Topics Concern    Not on file   Social History Narrative    Not on file     Social Determinants of Health     Financial Resource Strain: Low Risk     Difficulty of Paying Living Expenses: Not hard at all   Food Insecurity: No Food Insecurity    Worried About Running Out of Food in the Last Year: Never true    Ran Out of Food in the Last Year: Never true   Transportation Needs: Not on file   Physical Activity: Not on file   Stress: Not on file   Social Connections: Not on file   Intimate Partner Violence: Not on file   Housing Stability: Not on file       Family History   Problem Relation Age of Onset    Heart Disease Father        Allergies:  Patient has no known allergies. Home Medications:  Prior to Admission medications    Medication Sig Start Date End Date Taking? Authorizing Provider   amLODIPine (NORVASC) 5 MG tablet TAKE ONE TABLET BY MOUTH DAILY 1/3/23   Rolene PABLITO Mireles - CNP   venlafaxine (EFFEXOR XR) 37.5 MG extended release capsule TAKE ONE CAPSULE BY MOUTH TWICE A DAY 10/31/22   PABLITO Perera - CNP   traMADol (ULTRAM) 50 MG tablet as needed. 10/24/22   Historical Provider, MD   Misc. Devices (STEP N REST WALKER) MISC 1 each by Does not apply route continuous Seated, wheeled walker 10/28/22   Rosa M Bitter APRN - CNP   Baclofen (LIORESAL) 5 MG tablet Take 1 tablet by mouth 3 times daily as needed (muscle spasms) 10/6/22   Merril Heck APRN - CNP   lisinopril (PRINIVIL;ZESTRIL) 40 MG tablet Take 1 tablet by mouth in the morning. 7/19/22   Rosa M Bitter APRN - CNP   mirtazapine (REMERON) 15 MG tablet Take 0.5 tablets by mouth nightly 7/8/22   Rosa M Gail APRN - CNP   naproxen (NAPROSYN) 500 MG tablet Take 1 tablet by mouth 2 times daily (with meals) for 14 days 7/8/22 7/22/22  Rosa M Gail APRN - CNP   omeprazole (PRILOSEC) 40 MG delayed release capsule Take 1 capsule by mouth Daily 7/5/22   Rosa M Gail APRN - CNP   diclofenac sodium (VOLTAREN) 1 % GEL Apply 2-4 g topically 4 times daily as needed for Pain 5/13/22   Rosa M Bitter APRN - CNP   Multiple Vitamins-Minerals (MULTIVITAMIN ADULT) CHEW Take 1 tablet by mouth daily 5/13/22   Rosa M Gail APRN - CNP       REVIEW OF SYSTEMS       Review of Systems   Constitutional:  Negative for activity change, appetite change, chills, fatigue and fever. Respiratory:  Negative for shortness of breath. Cardiovascular:  Positive for chest pain. Negative for leg swelling. Patient reports some pain in her right side of her chest that is worse when she uses her cane she believes this is because she put so much weight on her cane   Gastrointestinal:  Positive for constipation. Negative for abdominal pain, nausea and vomiting. Musculoskeletal:  Positive for arthralgias, back pain and gait problem. Negative for joint swelling and neck pain. Skin:  Negative for rash. Neurological:  Positive for weakness. Negative for dizziness, facial asymmetry, light-headedness, numbness and headaches.      PHYSICAL EXAM      INITIAL VITALS:   BP (!) 151/79   Pulse 85   Temp 98.4 °F (36.9 °C) (Oral)   Resp 14   Wt 120 lb (54.4 kg)   SpO2 95%   BMI 24.24 kg/m²     Physical Exam  Vitals reviewed. Constitutional:       General: She is in acute distress. Appearance: She is normal weight. She is not ill-appearing. Comments: Patient appears uncomfortable, lower legs twitching/moving frequently   Eyes:      Conjunctiva/sclera: Conjunctivae normal.   Cardiovascular:      Rate and Rhythm: Normal rate and regular rhythm. Pulses: Normal pulses. Heart sounds: Normal heart sounds. Pulmonary:      Effort: Pulmonary effort is normal. No respiratory distress. Breath sounds: Normal breath sounds. Abdominal:      General: Bowel sounds are normal. There is no distension. Palpations: Abdomen is soft. Tenderness: There is no abdominal tenderness. Musculoskeletal:         General: No swelling, deformity or signs of injury. Cervical back: Normal.      Thoracic back: Normal.      Lumbar back: Tenderness and bony tenderness present. No swelling, edema, deformity or signs of trauma. Decreased range of motion. Right lower leg: No edema. Left lower leg: No edema. Comments: Patient reports worsening back pain with flexion of legs. Skin:     General: Skin is warm and dry. Neurological:      General: No focal deficit present. Mental Status: She is alert and oriented to person, place, and time. Sensory: No sensory deficit. Motor: No weakness. DDX/DIAGNOSTIC RESULTS / EMERGENCY DEPARTMENT COURSE / MDM     Medical Decision Making  Patient is an 81-year-old female who presents due to back pain. Patient has a history of osteoarthritis with nontraumatic fractures that required neurosurgical intervention. Patient denies any trauma or falls prior to incidence of back pain but does report that she had been sitting in a hard chair for the past 2 weeks while her  has been hospitalized. Patient has no focal neurologic findings, no saddle anesthesia, no incontinence.   Patient negative for any signs of fever, chills, shortness of breath or chest pain. Differential diagnosis includes fracture, spinal stenosis, muscular spasm, back strain. In the ED patient was given pain medications and was temporarily hypoxic to the high 80s. After some time patient's oxygenation improved to approximately 94% on room air. However due to desaturation without any signs of bradypnea a more extensive work-up was warranted in addition to baseline labs. Etiology of this desaturation may have been due to baseline lung disease, pneumonia, ACS. EKG showed mild changes consistent with low potassium which was confirmed by potassium measurement of 2.8. Amount and/or Complexity of Data Reviewed  Labs: ordered. Decision-making details documented in ED Course. Radiology: ordered. ECG/medicine tests: ordered. Risk  Prescription drug management. Decision regarding hospitalization. EMERGENCY DEPARTMENT COURSE:    ED Course as of 02/18/23 1450   Sat Feb 18, 2023   1146 Patient is an 81-year-old female with history of spinal fusion with Dr. Agustin Isabel in October 2022. Patient experiencing an acute worsening of back pain over the past 2 weeks with some weakness to the legs due to pain. No saddle anesthesia, incontinence or numbness, pulses present bilaterally in lower extremities. [HO]   9716 Patient noted to be mildly hypoxic to 86% with no complaints of shortness of breath and clear lung sounds. Placed on 2 L of oxygen with improvement in O2 sat. [CO]   1316 Evaluated patient who now reports pain is 6 out of 10 from 10 out of 10. Patient saturating approximately 93% on room air. [HO]   1333 Troponin, High Sensitivity(!): 19  Order repeat troponin for approximately 1 hour after previously measured, EKG with no acute findings consistent with ischemia. [HO]   1408 Potassium(!!): 2.8  Will order mag level and initiate IV and oral potassium repletion.   We will plan to admit to internal medicine for new fractures and electrolyte abnormality. [QL]   5372 CT showing new fractures in T10, L1-3 with canal stenosis at L3-L4.,  We will consult neurosurgery. [HO]   5096 Spoke to Dr. Hien Burrows with [facility] who will admit at this time. [HO]   1430 Chest x-ray showing bilateral opacities which may represent nodules versus subsegmental atelectasis versus cavitary lesions. CT chest recommended by radiology. [HO]      ED Course User Index  Mariana Gates MD       PROCEDURES:  none    CONSULTS:  IP CONSULT TO NEUROSURGERY  IP CONSULT TO INTERNAL MEDICINE    CRITICAL CARE:  There was significant risk of life threatening deterioration of patient's condition requiring my direct management. Critical care time less tan 30 minutes, excluding any documented procedures. FINAL IMPRESSION      1. Closed fracture of tenth thoracic vertebra, unspecified fracture morphology, initial encounter ([address]    2. Other closed fracture of third lumbar vertebra, initial encounter ([address]    3. Spinal stenosis of lumbar region, unspecified whether neurogenic claudication present    4. Hypokalemia    5. Elevated troponin          DISPOSITION / PLAN     DISPOSITION Admitted 02/18/2023 02:30:59 PM      PATIENT REFERRED TO:  No follow-up provider specified.     DISCHARGE MEDICATIONS:  New Prescriptions    No medications on file       Catie White MD  Emergency Medicine Resident    (Please note that portions of thisnote were completed with a voice recognition program.  Efforts were made to edit the dictations but occasionally words are mis-transcribed.)       Patricio Solano MD  Resident  02/18/23 2027

## 2023-02-18 NOTE — ED PROVIDER NOTES
[facility] [address] [facility]     Emergency Department     Faculty Attestation        I performed a history and physical examination of the patient and discussed management with the resident. I reviewed the residents note and agree with the documented findings and plan of care. Any areas of disagreement are noted on the chart. I was personally present for the key portions of any procedures. I have documented in the chart those procedures where I was not present during the key portions. I have reviewed the emergency nurses triage note. I agree with the chief complaint, past medical history, past surgical history, allergies, medications, social and family history as documented unless otherwise noted below. For Physician Assistant/ Nurse Practitioner cases/documentation I have personally evaluated this patient and have completed at least one if not all key elements of the E/M (history, physical exam, and MDM). Additional findings are as noted. Vital Signs: BP: (!) 156/81  Heart Rate: 85  Resp: 14  Temp: 98.4 °F (36.9 °C) SpO2: 95 %  PCP:  Clinton Monge, PABLITO - CNP    Pertinent Comments:     Patient is an 45-year-old female with history of hypertension as well as hepatitis C and previous spinal neurosurgery by Dr. Preston Pak for nontraumatic compression fracture. Patient has been having worsening high lumbar and low thoracic pain with no trauma but they suspect another nontraumatic compression fracture. Denies any chest pain or shortness of breath but back pain has been \"excruciating\" for her per her self and family. No loss of bowel or bladder function and no weakness in the lower extremities with currently strength distally 5/5 and sensation light touch intact with DTRs 2+ and equal bilaterally Babinski's downgoing. Assessment/plan: Patient with very painful midline T-spine/L-spine and will obtain thoracic and lumbar CT scans.    We will obtain basic laboratory work-up as well. We will check a monitor was noted to have oxygen saturation of 80% of unknown etiology but patient denies any shortness of breath will add on brief cardiac work-up as well as chest x-ray. Likely admission after        EKG Interpretation    Interpreted by emergency department physician    Rhythm: normal sinus   Rate: normal at 89 bpm  Axis: normal  Conduction: normal  ST Segments: no acute change  T Waves: no acute change  Q Waves: no acute change    Clinical Impression:  nonspecific EKG. Critical Care  None      (Please note that portions of this note were completed with a voice recognition program. Efforts were made to edit the dictations but occasionally words are mis-transcribed.  Whenever words are used in this note in any gender, they shall be construed as though they were used in the gender appropriate to the circumstances; and whenever words are used in this note in the singular or plural form, they shall be construed as though they were used in the form appropriate to the circumstances.)    MD Neftaly Rendon  Attending Emergency Medicine Physician            Baron Johns MD  02/18/23 1200       Baron Johns MD  02/18/23 1252       Baron Johns MD  02/18/23 1353

## 2023-02-18 NOTE — ED NOTES
Pt brought back to room from bathroom via wheelchair. Pt taken off 2L of O2 per Dr. Jess Bashir, pt remains at 92%.       Alvin Khan RN  02/18/23 7086

## 2023-02-18 NOTE — H&P
[facility]  Office: [address], DO, Maddie Cook, DO, Ariel Cannon, DO, Brenda Pooja Molina, DO, Renato Reyes MD, Rupert Kiser MD, Donn Kerns MD, Luvenia Duverney, MD,  Elvis Menon MD, Michael Olivo MD, Marla Chase, DO, Violeta Rao MD,  Gabriele Nicole MD, Mara Enriquez MD, Greer Bocanegra DO, Rony Catalan MD, Malvin Farias MD, Irma Ernandez DO, Masood Reyes MD, Yvette Walter MD, Luma Moon MD, Elmer Carroll MD, Jayla Warren DO, Esther Lemon MD, Tiburcio Alonso MD, Freddie Felix, CNP,  Zuri Clarke, CNP, Juan R David, CNP, Kenna Justin, CNP,  Janette Meadows, [facility], Cole Cassidy, CNP, Coby Bullard, CNP, Ebony Michel, CNP, Navneet Horne, CNP, Suni Macias, CNP, Norm Andersen PA-C, Bulmaro Stokes, LAURA, Earle Hadley, CNP, Ana Lindsey, CNP         IN-PATIENT SERVICE  History and Physical  [facility]          Name: Meghan Azevedo  MRN: [id number]     Acct: [de-identified]  Room: 36/36    Admit Date: 2/18/2023  PCP: PABLITO Mckenzie - CNP    Chief Complaint:  Chief Complaint   Patient presents with    Back Pain        History of Present Illness:  Meghan Azevedo is a 81 y.o.  female who presents with Back Pain    Patient was admitted thru ER with:  Anastasiya Patterson is a 81 y.o. female who presents with lower back pain for the past 2 weeks. Patient's grandchildren and son are in the room and provide additional history as patient is hard of hearing. Patient and patient's children report that she had a spinal fusion with Dr. Alyssa Winn at [facility] last October. Patient became involved with therapy and noticed an improvement in her back pain at that time. However for the past 2 weeks her  has been hospitalized and she had been sitting in a hard nonsupportive chair for several hours a day after which she experienced worsening upper back pain that has not been improved with physical therapy.   Patient has been taking Motrin with minimal improvement in addition to tramadol which did not seem to help at all. Patient has a history of osteoporotic fracture without trauma in the past.  Patient denies any incontinence but her family reports that she has been avoiding using any laxatives for her normal constipation as it is painful for her to have to go to the restroom. Patient denies any numbness in the saddle region or in lower extremities. She reports that she feels like she is weak in her lower extremities because of the pain when she is walking. Her family reports that she is having difficulty transferring and getting around due to the pain. Patient normally uses a cane to ambulate. \"     Her back pain is better controlled at this time  She has had chronic urinary frequency  No dysuria or polyuria  No change in bowel habits  No paralysis or paresis    Initial database has included:  WBC4.1k/uL RBC3.30 Low m/uL Majnnzawao10.0 Low      Troponin, High [id number] High      Calcium8.6mg/dL   Lpyvjr546qfgf/L   Potassium 2.8 Low Panic  mmol/L   Waxqsxfd858vacm/L   FN188olvc/L   Anion Gew1yfem/L     Alkaline Yrczhnxqmky660 High U/L   ALT15U/L   AST30U/L   Total Bilirubin0.7     Imaging:  Impression:    1. Global decreased bone mineral density. 2. Normal thoracic spine alignment with new/acute T10 insufficiency fracture   with approximately 5% loss of height. 3. Normal lumbar spine alignment with stable diffuse degenerative changes. 4. Compared to the prior 2022, imaging there is progressive height loss at   L1, with approximately 60% loss of height, L2, with approximately 10% loss of   height, and L3, with approximately 15% loss of height. All of these are   concerning for new/acute insufficiency fractures. 5. Moderate-severe L3/L4, canal stenosis related to degenerative changes with   superimposed insufficiency fracture posterior cortical displacement.          PMHx:  Past Medical History:   Diagnosis Date Acute hepatitis C virus infection without hepatic coma 7/5/2022    Anxiety and depression     Colitis     w/ inpatient colonscopy polyp removal    GERD (gastroesophageal reflux disease)     HTN (hypertension) 12/04/2014    Restless legs 11/17/2020        PSHx:  Past Surgical History:   Procedure Laterality Date    APPENDECTOMY      HYSTERECTOMY (CERVIX STATUS UNKNOWN)      SPINAL FUSION          Home Meds:  Prior to Admission medications    Medication Sig Start Date End Date Taking? Authorizing Provider   amLODIPine (NORVASC) 5 MG tablet TAKE ONE TABLET BY MOUTH DAILY 1/3/23   PABLITO Tsai CNP   venlafaxine (EFFEXOR XR) 37.5 MG extended release capsule TAKE ONE CAPSULE BY MOUTH TWICE A DAY 10/31/22   OndinaPABLITO Jesus CNP   traMADol (ULTRAM) 50 MG tablet as needed. 10/24/22   Historical Provider, MD   Misc. Devices (STEP N REST WALKER) MISC 1 each by Does not apply route continuous Seated, wheeled walker 10/28/22   PABLITO Tsai CNP   Baclofen (LIORESAL) 5 MG tablet Take 1 tablet by mouth 3 times daily as needed (muscle spasms) 10/6/22   PABLITO Denson CNP   lisinopril (PRINIVIL;ZESTRIL) 40 MG tablet Take 1 tablet by mouth in the morning. 7/19/22   PABLITO Tsai CNP   mirtazapine (REMERON) 15 MG tablet Take 0.5 tablets by mouth nightly 7/8/22   PABLITO Tsai CNP   naproxen (NAPROSYN) 500 MG tablet Take 1 tablet by mouth 2 times daily (with meals) for 14 days 7/8/22 7/22/22  PABLITO Tsai CNP   omeprazole (PRILOSEC) 40 MG delayed release capsule Take 1 capsule by mouth Daily 7/5/22   PABLITO Tsai CNP   diclofenac sodium (VOLTAREN) 1 % GEL Apply 2-4 g topically 4 times daily as needed for Pain 5/13/22   PABLITO Tsai CNP   Multiple Vitamins-Minerals (MULTIVITAMIN ADULT) CHEW Take 1 tablet by mouth daily 5/13/22   PABLITO Tsai CNP         Allergies:   Patient has no known allergies.     Social Hx:  Tobacco:    reports that she quit smoking about 43 years ago. Her smoking use included cigarettes. She has a 7.50 pack-year smoking history. She has never used smokeless tobacco.  Alcohol:      reports current alcohol use. Drug Use:  reports no history of drug use. Family Hx; Family History   Problem Relation Age of Onset    Heart Disease Father        ROS:  Review of Systems   Constitutional:  Positive for activity change (Decreased). Negative for appetite change, chills, diaphoresis, fatigue and fever. HENT:  Negative for congestion and nosebleeds. Eyes:  Negative for photophobia and visual disturbance. Respiratory:  Negative for cough, shortness of breath and wheezing. Cardiovascular:  Negative for chest pain and palpitations. Gastrointestinal:  Positive for constipation (Chronic). Negative for abdominal distention, blood in stool, nausea and vomiting. Genitourinary:  Positive for frequency. Negative for difficulty urinating, flank pain and hematuria. Musculoskeletal:  Positive for back pain (Lumbothoracic) and gait problem (Ambulating with a cane). Negative for arthralgias and myalgias. Skin:  Negative for rash and wound. Neurological:  Negative for dizziness and light-headedness. Psychiatric/Behavioral:  The patient is nervous/anxious (Requesting Ativan as needed). Physical Exam:  Vitals:  BP (!) 151/79   Pulse 85   Temp 98.4 °F (36.9 °C) (Oral)   Resp 14   Wt 120 lb (54.4 kg)   SpO2 95%   BMI 24.24 kg/m²   Temp (24hrs), Av.4 °F (36.9 °C), Min:98.4 °F (36.9 °C), Max:98.4 °F (36.9 °C)    Physical Exam  Vitals reviewed. Constitutional:       General: She is not in acute distress. Appearance: She is not diaphoretic. HENT:      Head: Normocephalic. Nose: Nose normal.   Eyes:      General: No scleral icterus. Conjunctiva/sclera: Conjunctivae normal.   Neck:      Trachea: No tracheal deviation. Cardiovascular:      Rate and Rhythm: Normal rate and regular rhythm.    Pulmonary:      Effort: Pulmonary effort is normal. No respiratory distress. Breath sounds: Normal breath sounds. No wheezing or rales. Chest:      Chest wall: No tenderness. Abdominal:      General: There is no distension. Palpations: Abdomen is soft. Tenderness: There is no abdominal tenderness. Musculoskeletal:         General: No tenderness. Cervical back: Neck supple. Skin:     General: Skin is warm and dry. Comments: Vitiligo noted   Neurological:      General: No focal deficit present. Mental Status: She is alert.          Data:  Laboratory Testing:  Recent Results (from the past 24 hour(s))   CBC with Auto Differential    Collection Time: 02/18/23 12:45 PM   Result Value Ref Range    WBC 4.1 3.5 - 11.3 k/uL    RBC 3.30 (L) 3.95 - 5.11 m/uL    Hemoglobin 11.0 (L) 11.9 - 15.1 g/dL    Hematocrit 31.8 (L) 36.3 - 47.1 %    MCV 96.4 82.6 - 102.9 fL    MCH 33.3 25.2 - 33.5 pg    MCHC 34.6 28.4 - 34.8 g/dL    RDW 12.8 11.8 - 14.4 %    Platelets 835 474 - 290 k/uL    MPV 9.9 8.1 - 13.5 fL    NRBC Automated 0.0 0.0 per 100 WBC    Seg Neutrophils 53 36 - 65 %    Lymphocytes 24 24 - 43 %    Monocytes 9 3 - 12 %    Eosinophils % 12 (H) 1 - 4 %    Basophils 1 0 - 2 %    Immature Granulocytes 1 (H) 0 %    Segs Absolute 2.24 1.50 - 8.10 k/uL    Absolute Lymph # 0.97 (L) 1.10 - 3.70 k/uL    Absolute Mono # 0.36 0.10 - 1.20 k/uL    Absolute Eos # 0.49 (H) 0.00 - 0.44 k/uL    Basophils Absolute 0.04 0.00 - 0.20 k/uL    Absolute Immature Granulocyte <0.03 0.00 - 0.30 k/uL   CMP    Collection Time: 02/18/23 12:45 PM   Result Value Ref Range    Glucose 121 (H) 70 - 99 mg/dL    BUN 5 (L) 8 - 23 mg/dL    Creatinine 0.49 (L) 0.50 - 0.90 mg/dL    Est, Glom Filt Rate >60 >60 mL/min/1.73m2    Calcium 8.6 8.6 - 10.4 mg/dL    Sodium 140 135 - 144 mmol/L    Potassium 2.8 (LL) 3.7 - 5.3 mmol/L    Chloride 104 98 - 107 mmol/L    CO2 27 20 - 31 mmol/L    Anion Gap 9 9 - 17 mmol/L    Alkaline Phosphatase 151 (H) 35 - 104 U/L ALT 15 5 - 33 U/L    AST 30 <32 U/L    Total Bilirubin 0.7 0.3 - 1.2 mg/dL    Total Protein 6.1 (L) 6.4 - 8.3 g/dL    Albumin 3.5 3.5 - 5.2 g/dL    Albumin/Globulin Ratio 1.3 1.0 - 2.5   Troponin    Collection Time: 02/18/23 12:45 PM   Result Value Ref Range    Troponin, High Sensitivity 19 (H) 0 - 14 ng/L   Troponin    Collection Time: 02/18/23  2:19 PM   Result Value Ref Range    Troponin, High Sensitivity 19 (H) 0 - 14 ng/L   Magnesium    Collection Time: 02/18/23  2:19 PM   Result Value Ref Range    Magnesium 1.8 1.6 - 2.6 mg/dL       Imaging/Diagnostics:  CT THORACIC SPINE WO CONTRAST    Result Date: 2/18/2023  1. Global decreased bone mineral density. 2. Normal thoracic spine alignment with new/acute T10 insufficiency fracture with approximately 5% loss of height. 3. Normal lumbar spine alignment with stable diffuse degenerative changes. 4. Compared to the prior 2022, imaging there is progressive height loss at L1, with approximately 60% loss of height, L2, with approximately 10% loss of height, and L3, with approximately 15% loss of height. All of these are concerning for new/acute insufficiency fractures. 5. Moderate-severe L3/L4, canal stenosis related to degenerative changes with superimposed insufficiency fracture posterior cortical displacement. CT LUMBAR SPINE WO CONTRAST    Result Date: 2/18/2023  1. Global decreased bone mineral density. 2. Normal thoracic spine alignment with new/acute T10 insufficiency fracture with approximately 5% loss of height. 3. Normal lumbar spine alignment with stable diffuse degenerative changes. 4. Compared to the prior 2022, imaging there is progressive height loss at L1, with approximately 60% loss of height, L2, with approximately 10% loss of height, and L3, with approximately 15% loss of height. All of these are concerning for new/acute insufficiency fractures.  5. Moderate-severe L3/L4, canal stenosis related to degenerative changes with superimposed insufficiency fracture posterior cortical displacement. XR CHEST PORTABLE    Result Date: 2/18/2023  1. COPD. No evidence of acute congestive heart failure. 2. Indeterminate bilateral peripheral pulmonary opacities which could represent nodules and possible cavitary lesion versus subsegmental consolidation. When clinically able, CT thorax without contrast can be performed to further evaluate the findings. Assessment:  Primary Problem  Closed wedge compression fracture of T10 vertebra [facility])    Active Hospital Problems    Diagnosis Date Noted    Compression fx, lumbar spine, closed, initial encounter ([facility] [address] [S32.000A] 02/18/2023     Priority: Medium    Closed wedge compression fracture of T10 vertebra ([facility] [address] [S22.070A] 02/18/2023     Priority: Medium    Spinal stenosis of lumbar region without neurogenic claudication [M48.061] 02/18/2023     Priority: Medium    Hypokalemia [E87.6] 02/18/2023     Priority: Medium    Troponin level elevated [R77.8] 02/18/2023     Priority: Medium    Age-related osteoporosis with current pathological fracture [M80.00XA] 10/28/2022     Priority: Medium    Anemia, normocytic normochromic [D64.9] 11/17/2020    HTN (hypertension) [I10] 12/04/2014       Plan:  [facility]  Neurosurgical consultation  Pain control  Correct electrolyte abnormalities  Potassium supplementation  Anxiolytics as needed  Trend troponins  Rule out UTI: Frequency  Osteoporosis eval & f/u as scheduled on outpatient basis  Anemia w/u on outpatient basis is suggested    Blood Pressure - Monitor and control  DVT prophylaxis   The patient's status and plan have been discussed with the patient and family at the bedside      Patient is admitted as inpatient status because of co-morbidities listed above, severity of signs and symptoms as outlined, requirement for current medical therapies and most importantly because of direct risk to patient if care not provided in a hospital setting.   Expected length of stay > 48 hours.      Consultations:   IP CONSULT TO NEUROSURGERY  IP CONSULT TO INTERNAL MEDICINE    Electronically signed by David Diaz DO on 2/18/2023 at 4:36 PM

## 2023-02-18 NOTE — CONSULTS
Department of Neurosurgery                                                             Consult Note      Reason for Consult: Back pain  Requesting Physician: Effie   Neurosurgeon:   [x] Dr. Madison Martínez   [] Dr. Abdulaziz Rodriguez  [] Dr. Ame Manzano    History Obtained From:  patient    CHIEF COMPLAINT:         Back pain    HISTORY OF PRESENT ILLNESS:       The patient is a 81 y.o. female who presents with lower back pain for past 2 weeks. History obtained from patient's grandchildren and son in the room. Patient had spinal fusion with Dr. Trinity Caballero at [facility] last October. Postprocedure patient was doing physical therapy with improvement in her back pain. Her  has been hospitalized for past 2 weeks and she had been sitting in the nonsupportive chair for several a day which she mentioned that might have worsened her upper back pain. Patient has been taking over the counter Motrin without any significant relief in addition to tramadol. Patient has a history of osteoporotic fracture without trauma in the past.  Patient denies any incontinence, tingling, numbness, saddle anesthesia. CT thoracic spine: New/acute T10 insufficiency C fracture with approximate 5% loss of height. CT lumbar spine progressive height loss at L1, 60% height of L2, 10% loss of L3. Moderate to severe L3-L4 canal stenosis related to degenerative changes with superimposed insufficiency fracture posterior cortical displacement    Neurosurgery consulted for above imaging finding  On evaluation patient alert oriented x3  Complaints of lower back pain, exacerbated by movement. Bilateral lower extremity 4+/5  Denies any tingling, numbness, saddle anesthesia, urinary incontinence.       PAST MEDICAL HISTORY :       Past Medical History:        Diagnosis Date    Acute hepatitis C virus infection without hepatic coma 7/5/2022    Anxiety and depression     Colitis     w/ inpatient colonscopy polyp removal    GERD (gastroesophageal reflux disease)     HTN (hypertension) 2014    Restless legs 2020       Past Surgical History:        Procedure Laterality Date    APPENDECTOMY      HYSTERECTOMY (CERVIX STATUS UNKNOWN)      SPINAL FUSION         Social History:   Social History     Socioeconomic History    Marital status:      Spouse name: Not on file    Number of children: Not on file    Years of education: Not on file    Highest education level: Not on file   Occupational History    Not on file   Tobacco Use    Smoking status: Former     Packs/day: 0.25     Years: 30.00     Pack years: 7.50     Types: Cigarettes     Quit date: 1980     Years since quittin.1    Smokeless tobacco: Never   Substance and Sexual Activity    Alcohol use: Yes     Comment: socially     Drug use: No    Sexual activity: Not on file   Other Topics Concern    Not on file   Social History Narrative    Not on file     Social Determinants of Health     Financial Resource Strain: Low Risk     Difficulty of Paying Living Expenses: Not hard at all   Food Insecurity: No Food Insecurity    Worried About Running Out of Food in the Last Year: Never true    Ran Out of Food in the Last Year: Never true   Transportation Needs: Not on file   Physical Activity: Not on file   Stress: Not on file   Social Connections: Not on file   Intimate Partner Violence: Not on file   Housing Stability: Not on file       Family History:       Problem Relation Age of Onset    Heart Disease Father        Allergies:  Patient has no known allergies. Home Medications:  Prior to Admission medications    Medication Sig Start Date End Date Taking? Authorizing Provider   amLODIPine (NORVASC) 5 MG tablet TAKE ONE TABLET BY MOUTH DAILY 1/3/23   PABLITO Garcia CNP   venlafaxine (EFFEXOR XR) 37.5 MG extended release capsule TAKE ONE CAPSULE BY MOUTH TWICE A DAY 10/31/22   PABLITO Perera CNP   traMADol (ULTRAM) 50 MG tablet as needed.  10/24/22 Historical Provider, MD   [facility]. Devices (STEP N REST WALKER) MISC 1 each by Does not apply route continuous Seated, wheeled walker 10/28/22   PABLITO Mae CNP   Baclofen (LIORESAL) 5 MG tablet Take 1 tablet by mouth 3 times daily as needed (muscle spasms) 10/6/22   PABLITO Senior CNP   lisinopril (PRINIVIL;ZESTRIL) 40 MG tablet Take 1 tablet by mouth in the morning.  7/19/22   PABLITO Mae CNP   mirtazapine (REMERON) 15 MG tablet Take 0.5 tablets by mouth nightly 7/8/22   PABLITO Mae CNP   naproxen (NAPROSYN) 500 MG tablet Take 1 tablet by mouth 2 times daily (with meals) for 14 days 7/8/22 7/22/22  PABLITO Mae CNP   omeprazole (PRILOSEC) 40 MG delayed release capsule Take 1 capsule by mouth Daily 7/5/22   PABLITO Mae CNP   diclofenac sodium (VOLTAREN) 1 % GEL Apply 2-4 g topically 4 times daily as needed for Pain 5/13/22   PABLITO Mae CNP   Multiple Vitamins-Minerals (MULTIVITAMIN ADULT) CHEW Take 1 tablet by mouth daily 5/13/22   PABLITO Mae CNP       Current Medications:   Current Facility-Administered Medications: traMADol (ULTRAM) tablet 50 mg, 50 mg, Oral, Q6H PRN  LORazepam (ATIVAN) tablet 0.5 mg, 0.5 mg, Oral, Q6H PRN  potassium chloride (KLOR-CON M) extended release tablet 40 mEq, 40 mEq, Oral, PRN **OR** potassium bicarb-citric acid (EFFER-K) effervescent tablet 40 mEq, 40 mEq, Oral, PRN **OR** potassium chloride 10 mEq/100 mL IVPB (Peripheral Line), 10 mEq, IntraVENous, PRN    REVIEW OF SYSTEMS:       CONSTITUTIONAL: negative for fatigue and malaise   EYES: negative for double vision and photophobia    HEENT: negative for tinnitus and sore throat   RESPIRATORY: negative for cough, shortness of breath   CARDIOVASCULAR: negative for chest pain, palpitations   GASTROINTESTINAL: negative for nausea, vomiting   GENITOURINARY: negative for incontinence   MUSCULOSKELETAL: negative for neck or back pain   NEUROLOGICAL: negative for seizures   PSYCHIATRIC: negative for agitated     Review of systems otherwise negative.     PHYSICAL EXAM:       /68   Pulse 85   Temp 98.4 °F (36.9 °C) (Oral)   Resp 21   Wt 120 lb (54.4 kg)   SpO2 91%   BMI 24.24 kg/m²        CONSTITUTIONAL: no apparent distress, appears stated age   HEAD: normocephalic, atraumatic   ENT: moist mucous membranes, uvula midline   NECK: supple, symmetric, no midline tenderness to palpation   BACK: without midline tenderness, step-offs or deformities   LUNGS: clear to auscultation bilaterally   CARDIOVASCULAR: regular rate and rhythm   ABDOMEN: Soft, non-tender, non-distended with normal active bowel sounds   NEUROLOGIC:  EYE OPENING     Spontaneous - 4 [x]       To voice - 3 []       To pain - 2 []       None - 1 []    VERBAL RESPONSE     Appropriate, oriented - 5 [x]       Dazed or confused - 4 []       Syllables, expletives - 3 []       Grunts - 2 []       None - 1 []    MOTOR RESPONSE     Spontaneous, command - 6 [x]       Localizes pain - 5 []       Withdraws pain - 4 []       Abnormal flexion - 3 []       Abnormal extension - 2 []       None - 1 []            Total GCS: 15    Mental Status: AOx3               Cranial Nerves:    cranial nerves II-XII are grossly intact    Motor Exam:    Drift:  absent  Tone:  normal    Motor exam is symmetrical 5 out of 5 upper extremities bilaterally  Bilateral lower extremity 4+/5    Sensory:    Right Upper Extremity:  normal  Left Upper Extremity:  normal  Right Lower Extremity:  normal  Left Lower Extremity:  normal  T tenderness  in thoracic spinal area  Deep Tendon Reflexes:    Right Bicep:  2+  Left Bicep:  2+  Right Knee:  2+  Left Knee:  2+    Plantar Response:    Right:  downgoing  Left:  downgoing    Clonus:  absent  Menjivar's:  absent    Gait:  unable to assess    SKIN: no rash       LABS AND IMAGING:     CBC with Differential:    Lab Results   Component Value Date/Time    WBC 4.1 02/18/2023 12:45 PM    RBC 3.30 02/18/2023 12:45 PM    HGB 11.0 02/18/2023 12:45 PM    HCT 31.8 02/18/2023 12:45 PM     02/18/2023 12:45 PM    MCV 96.4 02/18/2023 12:45 PM    MCH 33.3 02/18/2023 12:45 PM    MCHC 34.6 02/18/2023 12:45 PM    RDW 12.8 02/18/2023 12:45 PM    LYMPHOPCT 24 02/18/2023 12:45 PM    LYMPHOPCT 32.8 04/19/2022 12:00 AM    MONOPCT 9 02/18/2023 12:45 PM    MONOPCT 10.3 04/19/2022 12:00 AM    EOSPCT 11.7 04/19/2022 12:00 AM    BASOPCT 1 02/18/2023 12:45 PM    BASOPCT 1.1 04/19/2022 12:00 AM    MONOSABS 0.36 02/18/2023 12:45 PM    MONOSABS 0.45 04/19/2022 12:00 AM    LYMPHSABS 0.97 02/18/2023 12:45 PM    LYMPHSABS 1.43 04/19/2022 12:00 AM    EOSABS 0.49 02/18/2023 12:45 PM    EOSABS 0.51 04/19/2022 12:00 AM    BASOSABS 0.04 02/18/2023 12:45 PM    DIFFTYPE NOT REPORTED 12/18/2020 10:45 AM     BMP:    Lab Results   Component Value Date/Time     02/18/2023 12:45 PM    K 2.8 02/18/2023 12:45 PM     02/18/2023 12:45 PM    CO2 27 02/18/2023 12:45 PM    BUN 5 02/18/2023 12:45 PM    LABALBU 3.5 02/18/2023 12:45 PM    CREATININE 0.49 02/18/2023 12:45 PM    CALCIUM 8.6 02/18/2023 12:45 PM    GFRAA >60 07/01/2022 01:13 PM    LABGLOM >60 02/18/2023 12:45 PM    GLUCOSE 121 02/18/2023 12:45 PM       Radiology Review:     CT thoracic/ CT lumbar     1. Global decreased bone mineral density. 2. Normal thoracic spine alignment with new/acute T10 insufficiency fracture   with approximately 5% loss of height. 3. Normal lumbar spine alignment with stable diffuse degenerative changes. 4. Compared to the prior 2022, imaging there is progressive height loss at   L1, with approximately 60% loss of height, L2, with approximately 10% loss of   height, and L3, with approximately 15% loss of height. All of these are   concerning for new/acute insufficiency fractures.    5. Moderate-severe L3/L4, canal stenosis related to degenerative changes with   superimposed insufficiency fracture posterior cortical displacement      ASSESSMENT AND PLAN: Patient Active Problem List   Diagnosis    Anxiety and depression    Gastroesophageal reflux disease    HTN (hypertension)    Anemia, normocytic normochromic    Restless legs    Tardive dyskinesia    Elevated LFTs    Hepatitis C antibody positive in blood    Acute hepatitis C virus infection without hepatic coma    Compression fracture of L4 vertebra (HCC)    Inguinal hernia    Compression fracture of T12 vertebra (HCC)    Age-related osteoporosis with current pathological fracture    Compression fx, lumbar spine, closed, initial encounter ([facility] [address]    Closed wedge compression fracture of T10 vertebra ([facility] [address]    Spinal stenosis of lumbar region without neurogenic claudication    Hypokalemia    Troponin level elevated         A/P:  This is a 81 y.o. female with back pain previous history of possible kyphoplasty, presents with new onset back pain  Patient care will be discussed with attending, will reevaluated patient along with attending.      - No neurosurgical interventions planned for now   - CTLS recommendations: None  - HOB: 30 degrees    - Obtain MRI T and L-spine   - Neuro checks per protocol            Additional recommendations may follow    Please contact neurosurgery with any changes in patients neurologic status. Thank you for your consult.        Shilo Day MD   NS pager [phone]  2/18/2023  8:06 PM

## 2023-02-18 NOTE — ED NOTES
Pt requesting anxiety medication at this time. Dr. Rosalina Tee notified, awaiting orders.       Jazlyn Humphries RN  02/18/23 0919

## 2023-02-18 NOTE — ED NOTES
Dr. Wayne howe served regarding second EKG. Per , repeat is not needed.       Emily Victor RN  02/18/23 [id number]

## 2023-02-18 NOTE — ED NOTES
Pt to ER with family for c/o middle back pain. Per family, pt had non traumatic fx in her back that required surgery back in October due to Osteoporosis. Family states that the pain never went away completely but the pain subsided. Family reports that pt  is in the hospital and she spent the last few days in a wooden chair that has intensified the pain. Family is concerned for more fx in her spine. No acute distress noted, RR even and NL.    Dr. Dwayne Lawson at bedside to evaluate pt      Al England, JOE  02/18/23 [id number]

## 2023-02-18 NOTE — ED NOTES
The following labs were labeled with appropriate pt sticker and tubed to lab:     [] Blue     [] Lavender   [] on ice  [x] Green/yellow  [] Green/black [] on ice  [] Arian Crate  [] on ice  [] Yellow  [] Red  [] Type/ Screen  [] ABG  [] VBG    [] COVID-19 swab    [] Rapid  [] PCR  [] Flu swab  [] Peds Viral Panel     [] Urine Sample  [] Fecal Sample  [] Pelvic Cultures  [] Blood Cultures  [] X 2  [] STREP Cultures       Ray Huizar RN  02/18/23 1271

## 2023-02-18 NOTE — ED NOTES
Pt requesting more pain medication. Dr. Walker Simpler notified, awaiting orders.       Mp Wan RN  02/18/23 1257

## 2023-02-19 ENCOUNTER — APPOINTMENT (OUTPATIENT)
Dept: GENERAL RADIOLOGY | Age: 83
End: 2023-02-19
Payer: MEDICARE

## 2023-02-19 PROBLEM — S22.079A CLOSED T10 SPINAL FRACTURE (HCC): Status: ACTIVE | Noted: 2023-02-18

## 2023-02-19 LAB
ABSOLUTE EOS #: 0.45 K/UL (ref 0–0.44)
ABSOLUTE IMMATURE GRANULOCYTE: <0.03 K/UL (ref 0–0.3)
ABSOLUTE LYMPH #: 1.32 K/UL (ref 1.1–3.7)
ABSOLUTE MONO #: 0.51 K/UL (ref 0.1–1.2)
ANION GAP SERPL CALCULATED.3IONS-SCNC: 8 MMOL/L (ref 9–17)
BASOPHILS # BLD: 1 % (ref 0–2)
BASOPHILS ABSOLUTE: 0.04 K/UL (ref 0–0.2)
BUN SERPL-MCNC: 4 MG/DL (ref 8–23)
CALCIUM SERPL-MCNC: 8.5 MG/DL (ref 8.6–10.4)
CHLORIDE SERPL-SCNC: 104 MMOL/L (ref 98–107)
CO2 SERPL-SCNC: 27 MMOL/L (ref 20–31)
CREAT SERPL-MCNC: 0.46 MG/DL (ref 0.5–0.9)
EOSINOPHILS RELATIVE PERCENT: 8 % (ref 1–4)
GFR SERPL CREATININE-BSD FRML MDRD: >60 ML/MIN/1.73M2
GLUCOSE SERPL-MCNC: 101 MG/DL (ref 70–99)
HCT VFR BLD AUTO: 28.9 % (ref 36.3–47.1)
HGB BLD-MCNC: 9.8 G/DL (ref 11.9–15.1)
IMMATURE GRANULOCYTES: 0 %
LYMPHOCYTES # BLD: 23 % (ref 24–43)
MAGNESIUM SERPL-MCNC: 1.7 MG/DL (ref 1.6–2.6)
MCH RBC QN AUTO: 33.6 PG (ref 25.2–33.5)
MCHC RBC AUTO-ENTMCNC: 33.9 G/DL (ref 28.4–34.8)
MCV RBC AUTO: 99 FL (ref 82.6–102.9)
MONOCYTES # BLD: 9 % (ref 3–12)
NRBC AUTOMATED: 0.5 PER 100 WBC
PDW BLD-RTO: 12.7 % (ref 11.8–14.4)
PLATELET # BLD AUTO: 154 K/UL (ref 138–453)
PMV BLD AUTO: 9.6 FL (ref 8.1–13.5)
POTASSIUM SERPL-SCNC: 3.3 MMOL/L (ref 3.7–5.3)
RBC # BLD: 2.92 M/UL (ref 3.95–5.11)
SEG NEUTROPHILS: 59 % (ref 36–65)
SEGMENTED NEUTROPHILS ABSOLUTE COUNT: 3.36 K/UL (ref 1.5–8.1)
SODIUM SERPL-SCNC: 139 MMOL/L (ref 135–144)
TROPONIN I SERPL DL<=0.01 NG/ML-MCNC: 20 NG/L (ref 0–14)
TROPONIN I SERPL DL<=0.01 NG/ML-MCNC: 21 NG/L (ref 0–14)
TROPONIN I SERPL DL<=0.01 NG/ML-MCNC: 22 NG/L (ref 0–14)
WBC # BLD AUTO: 5.7 K/UL (ref 3.5–11.3)

## 2023-02-19 PROCEDURE — 97530 THERAPEUTIC ACTIVITIES: CPT

## 2023-02-19 PROCEDURE — 72020 X-RAY EXAM OF SPINE 1 VIEW: CPT

## 2023-02-19 PROCEDURE — 94761 N-INVAS EAR/PLS OXIMETRY MLT: CPT

## 2023-02-19 PROCEDURE — 6360000002 HC RX W HCPCS: Performed by: INTERNAL MEDICINE

## 2023-02-19 PROCEDURE — 99222 1ST HOSP IP/OBS MODERATE 55: CPT | Performed by: NEUROLOGICAL SURGERY

## 2023-02-19 PROCEDURE — 83735 ASSAY OF MAGNESIUM: CPT

## 2023-02-19 PROCEDURE — 99232 SBSQ HOSP IP/OBS MODERATE 35: CPT | Performed by: INTERNAL MEDICINE

## 2023-02-19 PROCEDURE — 2700000000 HC OXYGEN THERAPY PER DAY

## 2023-02-19 PROCEDURE — 2580000003 HC RX 258: Performed by: INTERNAL MEDICINE

## 2023-02-19 PROCEDURE — 85025 COMPLETE CBC W/AUTO DIFF WBC: CPT

## 2023-02-19 PROCEDURE — 6370000000 HC RX 637 (ALT 250 FOR IP): Performed by: INTERNAL MEDICINE

## 2023-02-19 PROCEDURE — 97162 PT EVAL MOD COMPLEX 30 MIN: CPT

## 2023-02-19 PROCEDURE — 36415 COLL VENOUS BLD VENIPUNCTURE: CPT

## 2023-02-19 PROCEDURE — 97110 THERAPEUTIC EXERCISES: CPT

## 2023-02-19 PROCEDURE — 1200000000 HC SEMI PRIVATE

## 2023-02-19 PROCEDURE — 84484 ASSAY OF TROPONIN QUANT: CPT

## 2023-02-19 PROCEDURE — 51798 US URINE CAPACITY MEASURE: CPT

## 2023-02-19 PROCEDURE — 80048 BASIC METABOLIC PNL TOTAL CA: CPT

## 2023-02-19 RX ORDER — DOCUSATE SODIUM 100 MG/1
100 CAPSULE, LIQUID FILLED ORAL DAILY
Status: DISCONTINUED | OUTPATIENT
Start: 2023-02-19 | End: 2023-02-25 | Stop reason: HOSPADM

## 2023-02-19 RX ORDER — OMEGA-3S/DHA/EPA/FISH OIL/D3 300MG-1000
400 CAPSULE ORAL DAILY
Status: DISCONTINUED | OUTPATIENT
Start: 2023-02-19 | End: 2023-02-25 | Stop reason: HOSPADM

## 2023-02-19 RX ORDER — CALCIUM CARBONATE 200(500)MG
1000 TABLET,CHEWABLE ORAL DAILY
Status: DISCONTINUED | OUTPATIENT
Start: 2023-02-19 | End: 2023-02-25 | Stop reason: HOSPADM

## 2023-02-19 RX ORDER — POLYETHYLENE GLYCOL 3350 17 G/17G
17 POWDER, FOR SOLUTION ORAL DAILY
Status: DISCONTINUED | OUTPATIENT
Start: 2023-02-20 | End: 2023-02-25 | Stop reason: HOSPADM

## 2023-02-19 RX ADMIN — BACLOFEN 5 MG: 5 TABLET ORAL at 09:30

## 2023-02-19 RX ADMIN — CHOLECALCIFEROL TAB 10 MCG (400 UNIT) 400 UNITS: 10 TAB at 13:20

## 2023-02-19 RX ADMIN — ENOXAPARIN SODIUM 40 MG: 100 INJECTION SUBCUTANEOUS at 09:29

## 2023-02-19 RX ADMIN — POTASSIUM CHLORIDE 40 MEQ: 1500 TABLET, EXTENDED RELEASE ORAL at 09:32

## 2023-02-19 RX ADMIN — VENLAFAXINE HYDROCHLORIDE 37.5 MG: 37.5 CAPSULE, EXTENDED RELEASE ORAL at 09:31

## 2023-02-19 RX ADMIN — DOCUSATE SODIUM 100 MG: 100 CAPSULE ORAL at 13:21

## 2023-02-19 RX ADMIN — VENLAFAXINE HYDROCHLORIDE 37.5 MG: 37.5 CAPSULE, EXTENDED RELEASE ORAL at 21:29

## 2023-02-19 RX ADMIN — BACLOFEN 5 MG: 5 TABLET ORAL at 01:56

## 2023-02-19 RX ADMIN — HYDROMORPHONE HYDROCHLORIDE 1 MG: 1 INJECTION, SOLUTION INTRAMUSCULAR; INTRAVENOUS; SUBCUTANEOUS at 09:46

## 2023-02-19 RX ADMIN — SODIUM CHLORIDE, PRESERVATIVE FREE 10 ML: 5 INJECTION INTRAVENOUS at 22:21

## 2023-02-19 RX ADMIN — Medication 1 TABLET: at 09:31

## 2023-02-19 RX ADMIN — TRAMADOL HYDROCHLORIDE 50 MG: 50 TABLET, COATED ORAL at 21:29

## 2023-02-19 RX ADMIN — MIRTAZAPINE 7.5 MG: 15 TABLET, FILM COATED ORAL at 21:30

## 2023-02-19 RX ADMIN — TRAMADOL HYDROCHLORIDE 50 MG: 50 TABLET, COATED ORAL at 00:33

## 2023-02-19 RX ADMIN — TRAMADOL HYDROCHLORIDE 50 MG: 50 TABLET, COATED ORAL at 06:31

## 2023-02-19 RX ADMIN — ANTACID TABLETS 1000 MG: 500 TABLET, CHEWABLE ORAL at 21:29

## 2023-02-19 RX ADMIN — LISINOPRIL 40 MG: 20 TABLET ORAL at 09:29

## 2023-02-19 RX ADMIN — SODIUM CHLORIDE, PRESERVATIVE FREE 10 ML: 5 INJECTION INTRAVENOUS at 09:45

## 2023-02-19 RX ADMIN — BACLOFEN 5 MG: 5 TABLET ORAL at 23:42

## 2023-02-19 RX ADMIN — PANTOPRAZOLE SODIUM 40 MG: 40 TABLET, DELAYED RELEASE ORAL at 09:30

## 2023-02-19 RX ADMIN — POLYETHYLENE GLYCOL 3350 17 G: 17 POWDER, FOR SOLUTION ORAL at 09:29

## 2023-02-19 RX ADMIN — AMLODIPINE BESYLATE 5 MG: 5 TABLET ORAL at 09:31

## 2023-02-19 ASSESSMENT — PAIN SCALES - GENERAL
PAINLEVEL_OUTOF10: 7
PAINLEVEL_OUTOF10: 7
PAINLEVEL_OUTOF10: 6
PAINLEVEL_OUTOF10: 10
PAINLEVEL_OUTOF10: 8

## 2023-02-19 ASSESSMENT — PAIN - FUNCTIONAL ASSESSMENT
PAIN_FUNCTIONAL_ASSESSMENT: PREVENTS OR INTERFERES SOME ACTIVE ACTIVITIES AND ADLS

## 2023-02-19 ASSESSMENT — ENCOUNTER SYMPTOMS
BACK PAIN: 1
NAUSEA: 0
CONSTIPATION: 1
COUGH: 0
SHORTNESS OF BREATH: 0
ABDOMINAL PAIN: 0
VOMITING: 0

## 2023-02-19 ASSESSMENT — PAIN DESCRIPTION - DESCRIPTORS
DESCRIPTORS: ACHING

## 2023-02-19 ASSESSMENT — PAIN SCALES - WONG BAKER
WONGBAKER_NUMERICALRESPONSE: 2

## 2023-02-19 ASSESSMENT — PAIN DESCRIPTION - LOCATION
LOCATION: BACK
LOCATION: RIB CAGE;BACK
LOCATION: BACK

## 2023-02-19 ASSESSMENT — PAIN DESCRIPTION - ORIENTATION: ORIENTATION: MID

## 2023-02-19 NOTE — PROGRESS NOTES
Physical Therapy  Facility/Department: [facility] NEURO  Physical Therapy Initial Assessment    Name: Kenji Goodson  : 1940  MRN: [id number]  Date of Service: 2023  History copied and pasted from H+P:  Patient was admitted thru ER with:  Cassy Parker is a 81 y.o. female who presents with lower back pain for the past 2 weeks. Patient's grandchildren and son are in the room and provide additional history as patient is hard of hearing. Patient and patient's children report that she had a spinal fusion with Dr. Mona Feliz at [facility] last October. Patient became involved with therapy and noticed an improvement in her back pain at that time. However for the past 2 weeks her  has been hospitalized and she had been sitting in a hard nonsupportive chair for several hours a day after which she experienced worsening upper back pain that has not been improved with physical therapy. Patient has been taking Motrin with minimal improvement in addition to tramadol which did not seem to help at all. Patient has a history of osteoporotic fracture without trauma in the past.  Patient denies any incontinence but her family reports that she has been avoiding using any laxatives for her normal constipation as it is painful for her to have to go to the restroom. Patient denies any numbness in the saddle region or in lower extremities. She reports that she feels like she is weak in her lower extremities because of the pain when she is walking. Her family reports that she is having difficulty transferring and getting around due to the pain. Patient normally uses a cane to ambulate. \" Pt found to have multiple compression fractures of the spine; no current plan from neurosurgery for surgery  Discharge Recommendations:  Further therapy recommended at discharge.                Patient Diagnosis(es): The primary encounter diagnosis was Closed fracture of tenth thoracic vertebra, unspecified fracture morphology, initial encounter [facility]). Diagnoses of Other closed fracture of third lumbar vertebra, initial encounter [facility]), Spinal stenosis of lumbar region, unspecified whether neurogenic claudication present, Hypokalemia, and Elevated troponin were also pertinent to this visit. Past Medical History:  has a past medical history of Acute hepatitis C virus infection without hepatic coma, Anxiety and depression, Colitis, GERD (gastroesophageal reflux disease), HTN (hypertension), and Restless legs. Past Surgical History:  has a past surgical history that includes Hysterectomy; Appendectomy; and Spinal fusion. Assessment   Pt cooperative, agreeable to get OOB but very limited d/t severe back pain with mobility (pt also having significant back pain at rest--RN aware). Pt unable to tolerate sitting up in chair this date. Only able to tolerate ~12' gait with rwalker and assistance to turn the walker. Body Structures, Functions, Activity Limitations Requiring Skilled Therapeutic Intervention: Decreased functional mobility ; Decreased ROM; Decreased body mechanics; Decreased strength;Decreased balance; Increased pain;Decreased posture  Therapy Prognosis: Good  Decision Making: Medium Complexity  Requires PT Follow-Up: Yes  Activity Tolerance  Activity Tolerance: Patient limited by pain (pt very motivated and agreeable to get OOB in spite of severe pain)     Plan   Physcial Therapy Plan  General Plan:  (5-6 visits weekly)  Current Treatment Recommendations: Strengthening, ROM, Balance training, Functional mobility training, Transfer training, Endurance training, Gait training, Stair training, Pain management, Home exercise program, Safety education & training, Patient/Caregiver education & training, Equipment evaluation, education, & procurement, Positioning, Therapeutic activities  Safety Devices  Type of Devices: Call light within reach, Gait belt, Patient at risk for falls, Left in bed, Nurse notified (pt unable to tolerate sitting in the chair; requested to return to bed; per granddtr, the pt has recently only been able to tolerate sitting in bed propped with pillows)  Restraints  Restraints Initially in Place: No     Restrictions  Restrictions/Precautions  Restrictions/Precautions: Fall Risk, General Precautions, Up as Tolerated  Required Braces or Orthoses?: No     Subjective   General  Patient assessed for rehabilitation services?: Yes  Response To Previous Treatment: Not applicable  Family / Caregiver Present: Yes (granddaughter who is a NP here with trauma)  Follows Commands: Within Functional Limits  Subjective  Subjective: 8/10 back         Social/Functional History  Social/Functional History  Lives With: Spouse (spouse is currently in an F d/t diabetic foot ulcer; grandson is here from [place] to stay with her)  Type of Home: House  Home Layout: One level, Laundry in basement ( did laundry, grandson is now doing it)  Home Access: Stairs to enter with rails  Entrance Stairs - Number of Steps: 4  Entrance Stairs - Rails: Both (the railings are too far apart for pt to reach both at the same time)  Home Equipment: "[employer] [address] Help From: Family  ADL Assistance: Independent  Ambulation Assistance: Independent  Transfer Assistance: Independent  Active : No ( normally drives, but granddaughter states he likely won't continue d/t neuropathy B feet; grandson is currently pt's )  Vision/Hearing  Vision  Vision: Within Functional Limits  Hearing  Hearing: Exceptions to [facility]  Hearing Exceptions: Hard of hearing/hearing concerns    Cognition   Orientation  Overall Orientation Status: Within Functional Limits  Cognition  Overall Cognitive Status: WFL     Objective   Heart Rate: 78  Heart Rate Source: Monitor  BP: 134/70  BP Location: Left upper arm  BP Method: Automatic  Patient Position: Supine  MAP (Calculated): 91  Resp: 10  SpO2: 96 %  O2 Device: None (Room air) (pt's O2 sats 92% and higher on room air during PT session; nasal canula replaced at end of PT session--O2 sats 97%)     Observation/Palpation  Posture: Fair        PROM RLE (degrees)  RLE PROM: Exceptions--supine only able to flex hip ~50 degrees d/t pain; knee distal WFL  PROM LLE (degrees)  LLE PROM: Exceptions--supine only able to flex hip ~50 degrees d/t pain; knee distal WFL  AROM RUE (degrees)  RUE AROM : WFL  AROM LUE (degrees)  LUE AROM : WFL  Strength RLE  Strength RLE: Exception--hip 2/5--limited by pain; knee distal WFL  Strength LLE  Strength LLE: Exception--hip 2/5--limited by pain; knee distal WFL  Strength RUE  Strength RUE: Exception--shoulder grossly 3/5--unable to tolerate MMT d/t increased back pain with resisted shoulder movement; elbow distal WFL  Strength LUE  Strength LUE: Exception--shoulder grossly 3/5--unable to tolerate MMT d/t increased back pain with resisted shoulder movement; elbow distal WFL           Bed mobility  Rolling to Left: Minimal assistance  Rolling to Right: Minimal assistance  Supine to Sit: Minimal assistance (HOB elevated, use of bed rails; pt moved very slowly d/t severe back pain, but agreeable to get OOB and ambulate)  Sit to Supine: Minimal assistance (assistance needed to get LEs back onto the bed)  Scooting: Dependent/Total;2 Person assistance (to scoot up in bed)  Transfers  Sit to Stand: Minimal Assistance  Stand to Sit: Minimal Assistance  Stand Pivot Transfers: Minimal Assistance (mod A to turn the walker)  Ambulation  Surface: Level tile  Device: Rolling Walker  Assistance: Minimal assistance  Quality of Gait: mildly flexed posture, tends to keep the rwalker too far out in front of her  Gait Deviations: Slow Kalyn;Decreased step length;Decreased step height;Decreased arm swing;Decreased head and trunk rotation; Shuffles  Distance: 12'x1  Stairs/Curb  Stairs?: No     Balance  Posture: Fair  Sitting - Static: Good  Sitting - Dynamic: Fair  Standing - Static: Fair  Standing - Dynamic: Fair  A/AROM Exercises: AROM x 4--limited d/t pain    AM-PAC Score  AM-PAC Inpatient Mobility Raw Score : 14 (02/19/23 0910)  AM-PAC Inpatient T-Scale Score : 38.1 (02/19/23 0910)  Mobility Inpatient CMS 0-100% Score: 61.29 (02/19/23 0910)  Mobility Inpatient CMS G-Code Modifier : CL (02/19/23 0910)          Goals  Short Term Goals  Time Frame for Short Term Goals: 14 visits  Short Term Goal 1: independent bed mobility  Short Term Goal 2: independent transfers  Short Term Goal 3: independent gait with rwalker x 50'  Short Term Goal 4: stair ambulation x 4 steps with 1 HR with min A+1  Patient Goals   Patient Goals : get rid of back pain       Education  Patient Education  Education Given To: Patient  Education Provided: Role of Therapy;Plan of Care;Transfer Training; Fall Prevention Strategies  Education Method: Verbal  Barriers to Learning: None  Education Outcome: Continued education needed      Therapy Time   Individual Concurrent Group Co-treatment   Time In 0802         Time Out 0904         Minutes 62         Timed Code Treatment Minutes: 2150 Dru Suggs, PT

## 2023-02-19 NOTE — CARE COORDINATION
Case Management Assessment  Initial Evaluation    Date/Time of Evaluation: 2/19/2023 9:13 AM  Assessment Completed by: Courtney Veras RN    If patient is discharged prior to next notation, then this note serves as note for discharge by case management. Patient Name: Flower Eugene                   YOB: 1940  Diagnosis: Hypokalemia [E87.6]  Elevated troponin [R77.8]  Compression fx, lumbar spine, closed, initial encounter ([facility] [S32.000A]  Other closed fracture of third lumbar vertebra, initial encounter ([facility] [S32.038A]  Closed fracture of tenth thoracic vertebra, unspecified fracture morphology, initial encounter ([facility] [facility] [S22.079A]  Spinal stenosis of lumbar region, unspecified whether neurogenic claudication present Justus Ching                   Date / Time: 2/18/2023 11:26 AM    Patient Admission Status: Inpatient   Readmission Risk (Low < 19, Mod (19-27), High > 27): Readmission Risk Score: 13    Current PCP: PABLITO Min CNP  PCP verified by CM? Yes    Chart Reviewed: Yes      History Provided by: Child/Family (cheri Amezquita)  Patient Orientation: Unable to Assess (Patient sleeping in room at time of assessment.)    Patient Cognition:      Hospitalization in the last 30 days (Readmission):  No    If yes, Readmission Assessment in  Navigator will be completed. Advance Directives:      Code Status: Full Code   Patient's Primary Decision Maker is: Legal Next of Kin    Primary Decision Maker: [place]ie [place] Child - [phone]    Discharge Planning:    Patient lives with: Family Members (lives with  but he is currently in [place] [id number] for woundcare) Type of Home: House  Primary Care Giver: Family  Patient Support Systems include: Children, Family Members   Current Financial resources: Medicare  Current community resources:  Other (Comment) (meals on wheels)  Current services prior to admission: Other (Comment) (outpatient PT at [address])            Current DME: Type of Home Care services:  PT, OT    ADLS  Prior functional level: Assistance with the following:, Housework, Cooking, Shopping  Current functional level:      PT AM-PAC: 14 /24  OT AM-PAC:   /24    Family can provide assistance at DC: Yes  Would you like Case Management to discuss the discharge plan with any other family members/significant others, and if so, who? No  Plans to Return to Present Housing: Unknown at present  Other Identified Issues/Barriers to RETURNING to current housing: impaired mobility  Potential Assistance needed at discharge: Home Care            Potential DME:    Patient expects to discharge to: [address] [place] [address] for transportation at discharge:      Financial    Payor: Donna Calzada / Plan: [address] [place] / Product Type: *No Product type* /     Does insurance require precert for SNF: Yes    Potential assistance Purchasing Medications: No  Meds-to-Beds request:        [facility] [id number] - [address] [address]  Phone: [phone] Fax: [phone]      Notes:    Factors facilitating achievement of predicted outcomes: Family support    Barriers to discharge: Decreased endurance and Lower extremity weakness    Additional Case Management Notes: CM spoke with patient's granddaughter William Hairston at bedside to discuss transitional planning. CM role explained and patient demographic information verified. Patient's grandson has been living with her since January when her  was admitted to [facility]. Goal is for patient to return home with family  care. Family is agreeable to [facility] if needed at discharge. Await PT/OT recommendations.     The Plan for Transition of Care is related to the following treatment goals of Hypokalemia [E87.6]  Elevated troponin [R77.8]  Compression fx, lumbar spine, closed, initial encounter ([facility] [facility] [S32.000A]  Other closed fracture of third lumbar vertebra, initial encounter ([facility] [address] [facility] [S32.038A]  Closed fracture of tenth thoracic vertebra, unspecified fracture morphology, initial encounter ([facility] [address] [S22.490C]  Spinal stenosis of lumbar region, unspecified whether neurogenic claudication present [N34.761]    IF APPLICABLE: The Patient and/or patient representative Isabelle Enriquez and her family were provided with a choice of provider and agrees with the discharge plan. Freedom of choice list with basic dialogue that supports the patient's individualized plan of care/goals and shares the quality data associated with the providers was provided to: Patient Representative   Patient Representative Name: Lazarus Melo     The Patient and/or Patient Representative Agree with the Discharge Plan?  Yes    Phill Giron RN  Case Management Department  Ph: [phone]

## 2023-02-19 NOTE — PROGRESS NOTES
Neurosurgery Resident  Daily Progress Note    2/19/2023  7:41 AM    Chart reviewed. No acute events overnight. Thoracic back pain present, unable to sleep or get comfortable. Otherwise, no new complaints. Denies numbness or tingling into extremities. Vitals reviewed, afebrile. Vitals:    02/19/23 0407 02/19/23 0631 02/19/23 0701 02/19/23 0724   BP: (!) 156/82   134/70   Pulse: 88   78   Resp: 12 18 18 10   Temp: 98.2 °F (36.8 °C)   98.5 °F (36.9 °C)   TempSrc: Axillary   Oral   SpO2: 97%   96%   Weight:           PE:   Gen: AOx3, NAD  Cardiovascular: Regular rate, no dependent edema, distal pulses 2+  Respiratory: Chest symmetric, no accessory muscle use, normal respirations   Neuro: Thoracic tenderness  5/5 BUE  4+/5 BLE  Sensation intact equal bilaterally  2+ patella reflex BLE    Lab Results   Component Value Date    WBC 5.7 02/19/2023    HGB 9.8 (L) 02/19/2023    HCT 28.9 (L) 02/19/2023     02/19/2023    CHOL 145 06/17/2019    TRIG 88 06/17/2019    HDL 78 (A) 06/17/2019    ALT 15 02/18/2023    AST 30 02/18/2023     02/19/2023    K 3.3 (L) 02/19/2023     02/19/2023    CREATININE 0.46 (L) 02/19/2023    BUN 4 (L) 02/19/2023    CO2 27 02/19/2023    TSH 0.928 05/26/2022    LABA1C 5.2 02/14/2022     EXAMINATION:   MRI OF THE THORACIC SPINE WITHOUT CONTRAST  2/18/2023 4:11 pm       TECHNIQUE:   Multiplanar multisequence MRI of the thoracic spine was performed without the   administration of intravenous contrast.       COMPARISON:   CT thoracic spine from today. HISTORY:   ORDERING SYSTEM PROVIDED HISTORY: fall   TECHNOLOGIST PROVIDED HISTORY:   fall   Decision Support Exception - unselect if not a suspected or confirmed   emergency medical condition->Emergency Medical Condition (MA)   Reason for Exam: fall       FINDINGS:   BONES/ALIGNMENT: Compression fractures T10, T12, L1 and L2. there also mild   compression fractures at L2, and L5.   Linear fluid signal is noted within the   T10 vertebral body. No significant retropulsion. Signal loss T12 vertebral   body consistent with methylmethacrylate. SPINAL CORD: No abnormal cord signal is seen. SOFT TISSUES: No paraspinal mass identified. DEGENERATIVE CHANGES: No significant spinal canal stenosis or neural   foraminal narrowing of the thoracic spine. Impression   Multiple compression fractures as above. Fluid signal within the T10   vertebral body suggests it as being relatively acute is compared to the   others. EXAMINATION:   MRI OF THE LUMBAR SPINE WITHOUT CONTRAST, 2/18/2023 4:11 pm       TECHNIQUE:   Multiplanar multisequence MRI of the lumbar spine was performed without the   administration of intravenous contrast.       COMPARISON:   CT lumbar spine 4 hours ago. HISTORY:   ORDERING SYSTEM PROVIDED HISTORY: fall   TECHNOLOGIST PROVIDED HISTORY:   fall   Decision Support Exception - unselect if not a suspected or confirmed   emergency medical condition->Emergency Medical Condition (MA)   Reason for Exam: fall       FINDINGS:   BONES/ALIGNMENT: Multiple compression fractures T10, T12, L1, L2, L3, L4. Fluid signal noted within the T10 vertebral body otherwise no acute edema or   significant retropulsion. SPINAL CORD: The conus terminates normally. SOFT TISSUES: No paraspinal mass identified. L1-L2: Moderate central posterior disc marginal osteophyte. Central canal   and neural foramina patent. L2-L3: There is no significant disc herniation, spinal canal stenosis or   neural foraminal narrowing. L3-L4: Moderate trefoil type narrowing of the thecal sac and severe narrowing   of the neural foramina bilaterally due to hypertrophic facet disease. L4-L5: There is no significant disc herniation, spinal canal stenosis. Hypertrophic facet disease and moderate bilateral neural foraminal narrowing.        L5-S1: There is no significant disc herniation, spinal canal stenosis. Moderate bilateral neural foraminal narrowing. Impression   Multiple compression fractures as above. Moderate disc marginal osteophyte   and spinal stenosis at L1-2. Multilevel bilateral neural foraminal narrowing.       81 y.o. female with back pain previous history of possible kyphoplasty, presents with new onset back pain, acute T10 fracture                   - Pending treatment plan   - F/u Lumbar/thoracic standing lateral radiographs   - MRI T/L to be reviewed  - CTLS recommendations: None  - HOB: 30 degrees               - Neuro checks per protocol     Please contact neurosurgery with any changes in patients neurologic status.       Indu Layton DO   7:41 AM

## 2023-02-19 NOTE — ED NOTES
Report given to [facility] on 1A     Killian Taylor RN  02/1940

## 2023-02-19 NOTE — PLAN OF CARE

## 2023-02-19 NOTE — PROGRESS NOTES
[facility]  Office: [address], DO, Andrea Chester, DO, Mindi Beets, DO, Jd Arellano Blood, DO, Mimi Antonio MD, Ezequiel Duff MD, Johnnie Claude, MD, Rahel Esteban MD,  Karmen Jaimes MD, Truett Burkitt, MD, Diana Chappell, DO, Andrew West MD,  Yahaira Oliveros MD, Lorne Akhtar MD, Kassandra Cohen, DO, Radha Sierra MD, Anastasiya Warner MD, Bianka Esquivel, DO, Lesly Srivastava MD, Sabra Chanel MD, Madeline Segovia MD, Shira Edwards MD, Trena Dorantes, DO, Navneet Velez MD, Mar Reynolds MD, Catarino Hanna, CNP,  Jaelyn Haney, CNP, Shannan Harrell, CNP, Seble Romano, CNP,  Serena Cervantes, DNP, Marylen Net, CNP, Malick Valle, CNP, Miryam Leyva, CNP, Nicki Mae, CNP, Shaggy Pimentel, CNP, KADEEM McgarryC, Gill Cross, CNS, Joseph Bailey, CNP, Radha Tran, CNP         [address]    Progress Note    2/19/2023    11:07 AM    Name:   Carlos Justice  MRN:     [id number]     Acct:      [de-identified]   Room:   [address] [place] [address] Day:  1  Admit Date:  2/18/2023 11:26 AM    PCP:   PABLITO Hernandez CNP  Code Status:  Full Code    Subjective:     C/C:   Chief Complaint   Patient presents with    Back Pain     Interval History Status:   Patient lying flat  Back pain not well controlled  (History of Tylenol intolerance)  Still feeling constipated    Data Base Updates:  MRI:  Multiple compression fractures as above. Moderate disc marginal osteophyte   and spinal stenosis at L1-2. Multilevel bilateral neural foraminal narrowing. Fluid signal within the T10 vertebral body suggests it as being relatively acute is compared to the others. EKG:  Normal sinus rhythm   Normal ECG   No previous ECGs available     Troponin, High Ovaxekwdnvd76 High      Mavqrgv973 High mg/dL     BUN4 Low mg/dL Creatinine0.46 Low      Calcium8. 5 Low mg/dL   Ctxdxq691zjqz/L   Potassium3. 3 Low      WBC5.7k/uL RBC2.92 Low m/uL Hemoglobin9.8 Low      UA:  Color, UAYellow Turbidity UAClear Glucose, UrNEGATIVE Bilirubin UrineNEGATIVE Ketones, UrineNEGATIVE Specific Gravity, UA1.008 Urine HgbNEGATIVE pH, UA7.5 Protein, UANEGATIVE Urobilinogen, UrineNormal Nitrite, UrineNEGATIVE Leukocyte Esterase, UrineNEGATIVE Urinalysis CommentsMicroscopic exam not performed based on chemical results unless requested in original order. Brief History:     Patient was admitted thru ER with:  Bharat Colunga is a 81 y.o. female who presents with lower back pain for the past 2 weeks. Patient's grandchildren and son are in the room and provide additional history as patient is hard of hearing. Patient and patient's children report that she had a spinal fusion with Dr. Sonya Rodriguez at [facility] last October. Patient became involved with therapy and noticed an improvement in her back pain at that time. However for the past 2 weeks her  has been hospitalized and she had been sitting in a hard nonsupportive chair for several hours a day after which she experienced worsening upper back pain that has not been improved with physical therapy. Patient has been taking Motrin with minimal improvement in addition to tramadol which did not seem to help at all. Patient has a history of osteoporotic fracture without trauma in the past.  Patient denies any incontinence but her family reports that she has been avoiding using any laxatives for her normal constipation as it is painful for her to have to go to the restroom. Patient denies any numbness in the saddle region or in lower extremities. She reports that she feels like she is weak in her lower extremities because of the pain when she is walking. Her family reports that she is having difficulty transferring and getting around due to the pain. Patient normally uses a cane to ambulate.  \"      Her back pain is better controlled at this time  She has had chronic urinary frequency  No dysuria or polyuria  No change in bowel habits  No paralysis or paresis     Initial database has included:  WBC4.1k/uL RBC3.30 Low m/uL Rxojcguflm20.0 Low       Troponin, High Lbsvsqwxaah22 High       Calcium8.6mg/dL   Viisui468phoh/L   Potassium 2.8 Low Panic  mmol/L   Tbezhgwh702dlfc/L   BH667yuep/L   Anion Ult0szqm/L      Alkaline Vgbndbkclbk959 High U/L   ALT15U/L   AST30U/L   Total Bilirubin0.7      Imaging:  CT scan:    1. Global decreased bone mineral density. 2. Normal thoracic spine alignment with new/acute T10 insufficiency fracture   with approximately 5% loss of height. 3. Normal lumbar spine alignment with stable diffuse degenerative changes. 4. Compared to the prior 2022, imaging there is progressive height loss at   L1, with approximately 60% loss of height, L2, with approximately 10% loss of   height, and L3, with approximately 15% loss of height. All of these are   concerning for new/acute insufficiency fractures. 5. Moderate-severe L3/L4, canal stenosis related to degenerative changes with   superimposed insufficiency fracture posterior cortical displacement. MRI:  Multiple compression fractures as above. Moderate disc marginal osteophyte   and spinal stenosis at L1-2. Multilevel bilateral neural foraminal narrowing. Fluid signal within the T10 vertebral body suggests it as being relatively acute is compared to the others. Troponins appear to be stable:     The initial plan included:  Admit  Neurosurgical consultation  Pain control  Correct electrolyte abnormalities  Potassium supplementation  Anxiolytics as needed  Trend troponins  Rule out UTI: Frequency  Osteoporosis eval & f/u as scheduled on outpatient basis  Anemia w/u on outpatient basis is suggested    Blood Pressure - Monitor and control  DVT prophylaxis   The patient's status and plan have been discussed with the patient and family at the bedside       Past Medical History:   has a past medical history of Acute hepatitis C virus infection without hepatic coma, Anxiety and depression, Colitis, GERD (gastroesophageal reflux disease), HTN (hypertension), and Restless legs. Social History:   reports that she quit smoking about 43 years ago. Her smoking use included cigarettes. She has a 7.50 pack-year smoking history. She has never used smokeless tobacco. She reports current alcohol use. She reports that she does not use drugs. Family History:   Family History   Problem Relation Age of Onset    Heart Disease Father        Review of Systems:     Review of Systems   Constitutional:  Positive for activity change (Decreased). Respiratory:  Negative for cough and shortness of breath. Cardiovascular:  Negative for chest pain and palpitations. Gastrointestinal:  Positive for constipation. Negative for abdominal pain, nausea and vomiting. Genitourinary:  Positive for frequency. Negative for difficulty urinating, flank pain and hematuria. Musculoskeletal:  Positive for back pain (Not well controlled, lumbothoracic pains) and gait problem (Ambulates with a cane). Neurological:  Positive for weakness. Physical Examination:        Vitals  /64   Pulse 79   Temp 98.5 °F (36.9 °C) (Oral)   Resp 14   Wt 120 lb (54.4 kg)   SpO2 96%   BMI 24.24 kg/m²   Temp (24hrs), Av.3 °F (36.8 °C), Min:98 °F (36.7 °C), Max:98.5 °F (36.9 °C)    No results for input(s): POCGLU in the last 72 hours. Physical Exam  Vitals reviewed. Constitutional:       General: She is not in acute distress. Appearance: She is not diaphoretic. HENT:      Head: Normocephalic. Nose: Nose normal.   Eyes:      General: No scleral icterus. Conjunctiva/sclera: Conjunctivae normal.   Neck:      Trachea: No tracheal deviation. Cardiovascular:      Rate and Rhythm: Normal rate and regular rhythm. Pulmonary:      Effort: Pulmonary effort is normal. No respiratory distress. Breath sounds: Normal breath sounds. No wheezing or rales.    Chest: Chest wall: No tenderness. Abdominal:      General: There is no distension. Palpations: Abdomen is soft. Tenderness: There is no abdominal tenderness. Musculoskeletal:         General: No tenderness. Cervical back: Neck supple. Skin:     General: Skin is warm and dry. Neurological:      General: No focal deficit present. Mental Status: She is alert. Medications: Allergies:  No Known Allergies    Current Meds:   Scheduled Meds:    docusate sodium  100 mg Oral Daily    [START ON 2/20/2023] polyethylene glycol  17 g Oral Daily    vitamin D3  400 Units Oral Daily    calcium carbonate  1,000 mg Oral Daily    amLODIPine  5 mg Oral Daily    mirtazapine  7.5 mg Oral Nightly    therapeutic multivitamin-minerals  1 tablet Oral Daily    lisinopril  40 mg Oral Daily    pantoprazole  40 mg Oral QAM AC    venlafaxine  37.5 mg Oral BID    sodium chloride flush  5-40 mL IntraVENous 2 times per day    enoxaparin  40 mg SubCUTAneous Daily     Continuous Infusions:    sodium chloride       PRN Meds: HYDROmorphone, Baclofen, traMADol, sodium chloride flush, sodium chloride, potassium chloride **OR** potassium alternative oral replacement **OR** potassium chloride, ondansetron **OR** ondansetron, bisacodyl, acetaminophen **OR** acetaminophen, LORazepam, potassium chloride **OR** potassium alternative oral replacement **OR** potassium chloride    Data:     I/O (24Hr):     Intake/Output Summary (Last 24 hours) at 2/19/2023 1107  Last data filed at 2/19/2023 0959  Gross per 24 hour   Intake 250 ml   Output 600 ml   Net -350 ml       Labs:  Hematology:  Recent Labs     02/18/23  1245 02/19/23  0105   WBC 4.1 5.7   RBC 3.30* 2.92*   HGB 11.0* 9.8*   HCT 31.8* 28.9*   MCV 96.4 99.0   MCH 33.3 33.6*   MCHC 34.6 33.9   RDW 12.8 12.7    154   MPV 9.9 9.6     Chemistry:  Recent Labs     02/18/23  1245 02/18/23  1419 02/19/23  0105 02/19/23  0929     --  139  --    K 2.8*  --  3.3*  --     --  104  --    CO2 27  --  27  --    GLUCOSE 121*  --  101*  --    BUN 5*  --  4*  --    CREATININE 0.49*  --  0.46*  --    MG  --  1.8 1.7  --    ANIONGAP 9  --  8*  --    LABGLOM >60  --  >60  --    CALCIUM 8.6  --  8.5*  --    TROPHS 19* 19* 20* 21*     Recent Labs     02/18/23  1245   PROT 6.1*   LABALBU 3.5   AST 30   ALT 15   ALKPHOS 151*   BILITOT 0.7     ABG:No results found for: POCPH, PHART, PH, POCPCO2, FIY0SGH, PCO2, POCPO2, PO2ART, PO2, POCHCO3, ZTY1LSZ, HCO3, NBEA, PBEA, BEART, BE, THGBART, THB, EZW9GGT, SQUQ7LHS, J1TOTZTM, O2SAT, FIO2  Lab Results   Component Value Date/Time    SPECIAL NOT REPORTED 02/08/2021 09:45 AM     Lab Results   Component Value Date/Time    CULTURE NO SIGNIFICANT GROWTH 07/07/2022 02:30 PM       Radiology:  CT THORACIC SPINE WO CONTRAST    Result Date: 2/18/2023  1. Global decreased bone mineral density. 2. Normal thoracic spine alignment with new/acute T10 insufficiency fracture with approximately 5% loss of height. 3. Normal lumbar spine alignment with stable diffuse degenerative changes. 4. Compared to the prior 2022, imaging there is progressive height loss at L1, with approximately 60% loss of height, L2, with approximately 10% loss of height, and L3, with approximately 15% loss of height. All of these are concerning for new/acute insufficiency fractures. 5. Moderate-severe L3/L4, canal stenosis related to degenerative changes with superimposed insufficiency fracture posterior cortical displacement. CT LUMBAR SPINE WO CONTRAST    Result Date: 2/18/2023  1. Global decreased bone mineral density. 2. Normal thoracic spine alignment with new/acute T10 insufficiency fracture with approximately 5% loss of height. 3. Normal lumbar spine alignment with stable diffuse degenerative changes.  4. Compared to the prior 2022, imaging there is progressive height loss at L1, with approximately 60% loss of height, L2, with approximately 10% loss of height, and L3, with approximately 15% loss of height. All of these are concerning for new/acute insufficiency fractures. 5. Moderate-severe L3/L4, canal stenosis related to degenerative changes with superimposed insufficiency fracture posterior cortical displacement. MRI THORACIC SPINE WO CONTRAST    Result Date: 2/18/2023  Multiple compression fractures as above. Fluid signal within the T10 vertebral body suggests it as being relatively acute is compared to the others. MRI LUMBAR SPINE WO CONTRAST    Result Date: 2/18/2023  Multiple compression fractures as above. Moderate disc marginal osteophyte and spinal stenosis at L1-2. Multilevel bilateral neural foraminal narrowing. XR CHEST PORTABLE    Result Date: 2/18/2023  1. COPD. No evidence of acute congestive heart failure. 2. Indeterminate bilateral peripheral pulmonary opacities which could represent nodules and possible cavitary lesion versus subsegmental consolidation. When clinically able, CT thorax without contrast can be performed to further evaluate the findings.        Assessment:        Primary Problem  Closed wedge compression fracture of T10 vertebra [facility])    Active Hospital Problems    Diagnosis Date Noted    Compression fx, lumbar spine, closed, initial encounter ([facility] [address] [S32.000A] 02/18/2023     Priority: Medium    Closed wedge compression fracture of T10 vertebra ([facility] [address] [S22.070A] 02/18/2023     Priority: Medium    Spinal stenosis of lumbar region without neurogenic claudication [M48.061] 02/18/2023     Priority: Medium    Hypokalemia [E87.6] 02/18/2023     Priority: Medium    Troponin level elevated [R77.8] 02/18/2023     Priority: Medium    Age-related osteoporosis with current pathological fracture [M80.00XA] 10/28/2022     Priority: Medium    Anemia, normocytic normochromic [D64.9] 11/17/2020    HTN (hypertension) [I10] 12/04/2014         Plan:        Neurosurgical consultation  Pain control  Correct electrolyte abnormalities  Potassium supplementation  Anxiolytics as needed  Rule out UTI: Frequency  Osteoporosis eval & f/u as scheduled on outpatient basis  Calcium  Vitamin D  Anemia w/u on outpatient basis is suggested    Blood Pressure - Monitor and control  PT/OT eval  DVT prophylaxis       IP CONSULT TO NEUROSURGERY  IP CONSULT TO INTERNAL MEDICINE    Elaine Mcgovern DO  2/19/2023  11:07 AM

## 2023-02-20 PROBLEM — M48.061 SPINAL STENOSIS OF LUMBAR REGION: Status: ACTIVE | Noted: 2023-02-20

## 2023-02-20 LAB
ANION GAP SERPL CALCULATED.3IONS-SCNC: 7 MMOL/L (ref 9–17)
BUN SERPL-MCNC: 6 MG/DL (ref 8–23)
CALCIUM SERPL-MCNC: 8.6 MG/DL (ref 8.6–10.4)
CHLORIDE SERPL-SCNC: 104 MMOL/L (ref 98–107)
CO2 SERPL-SCNC: 27 MMOL/L (ref 20–31)
CREAT SERPL-MCNC: 0.55 MG/DL (ref 0.5–0.9)
EKG ATRIAL RATE: 89 BPM
EKG P AXIS: 46 DEGREES
EKG P-R INTERVAL: 134 MS
EKG Q-T INTERVAL: 374 MS
EKG QRS DURATION: 76 MS
EKG QTC CALCULATION (BAZETT): 455 MS
EKG R AXIS: 46 DEGREES
EKG T AXIS: 9 DEGREES
EKG VENTRICULAR RATE: 89 BPM
GFR SERPL CREATININE-BSD FRML MDRD: >60 ML/MIN/1.73M2
GLUCOSE SERPL-MCNC: 96 MG/DL (ref 70–99)
POTASSIUM SERPL-SCNC: 3.9 MMOL/L (ref 3.7–5.3)
SODIUM SERPL-SCNC: 138 MMOL/L (ref 135–144)

## 2023-02-20 PROCEDURE — 6370000000 HC RX 637 (ALT 250 FOR IP): Performed by: INTERNAL MEDICINE

## 2023-02-20 PROCEDURE — 97166 OT EVAL MOD COMPLEX 45 MIN: CPT

## 2023-02-20 PROCEDURE — 97530 THERAPEUTIC ACTIVITIES: CPT

## 2023-02-20 PROCEDURE — 6360000002 HC RX W HCPCS: Performed by: INTERNAL MEDICINE

## 2023-02-20 PROCEDURE — 36415 COLL VENOUS BLD VENIPUNCTURE: CPT

## 2023-02-20 PROCEDURE — 2580000003 HC RX 258: Performed by: INTERNAL MEDICINE

## 2023-02-20 PROCEDURE — 80048 BASIC METABOLIC PNL TOTAL CA: CPT

## 2023-02-20 PROCEDURE — 97535 SELF CARE MNGMENT TRAINING: CPT

## 2023-02-20 PROCEDURE — 99232 SBSQ HOSP IP/OBS MODERATE 35: CPT | Performed by: INTERNAL MEDICINE

## 2023-02-20 PROCEDURE — 1200000000 HC SEMI PRIVATE

## 2023-02-20 RX ADMIN — SODIUM CHLORIDE, PRESERVATIVE FREE 10 ML: 5 INJECTION INTRAVENOUS at 09:32

## 2023-02-20 RX ADMIN — DOCUSATE SODIUM 100 MG: 100 CAPSULE ORAL at 08:19

## 2023-02-20 RX ADMIN — TRAMADOL HYDROCHLORIDE 50 MG: 50 TABLET, COATED ORAL at 14:42

## 2023-02-20 RX ADMIN — Medication 1 TABLET: at 08:19

## 2023-02-20 RX ADMIN — POLYETHYLENE GLYCOL 3350 17 G: 17 POWDER, FOR SOLUTION ORAL at 08:18

## 2023-02-20 RX ADMIN — ANTACID TABLETS 1000 MG: 500 TABLET, CHEWABLE ORAL at 20:40

## 2023-02-20 RX ADMIN — BACLOFEN 5 MG: 5 TABLET ORAL at 17:27

## 2023-02-20 RX ADMIN — LISINOPRIL 40 MG: 20 TABLET ORAL at 08:19

## 2023-02-20 RX ADMIN — MIRTAZAPINE 7.5 MG: 15 TABLET, FILM COATED ORAL at 20:40

## 2023-02-20 RX ADMIN — VENLAFAXINE HYDROCHLORIDE 37.5 MG: 37.5 CAPSULE, EXTENDED RELEASE ORAL at 08:19

## 2023-02-20 RX ADMIN — TRAMADOL HYDROCHLORIDE 50 MG: 50 TABLET, COATED ORAL at 02:39

## 2023-02-20 RX ADMIN — SODIUM CHLORIDE, PRESERVATIVE FREE 10 ML: 5 INJECTION INTRAVENOUS at 20:41

## 2023-02-20 RX ADMIN — PANTOPRAZOLE SODIUM 40 MG: 40 TABLET, DELAYED RELEASE ORAL at 08:18

## 2023-02-20 RX ADMIN — ENOXAPARIN SODIUM 40 MG: 100 INJECTION SUBCUTANEOUS at 08:19

## 2023-02-20 RX ADMIN — ACETAMINOPHEN 650 MG: 325 TABLET ORAL at 02:41

## 2023-02-20 RX ADMIN — ACETAMINOPHEN 650 MG: 325 TABLET ORAL at 17:27

## 2023-02-20 RX ADMIN — VENLAFAXINE HYDROCHLORIDE 37.5 MG: 37.5 CAPSULE, EXTENDED RELEASE ORAL at 20:41

## 2023-02-20 RX ADMIN — AMLODIPINE BESYLATE 5 MG: 5 TABLET ORAL at 08:18

## 2023-02-20 RX ADMIN — CHOLECALCIFEROL TAB 10 MCG (400 UNIT) 400 UNITS: 10 TAB at 08:18

## 2023-02-20 ASSESSMENT — PAIN SCALES - GENERAL
PAINLEVEL_OUTOF10: 8
PAINLEVEL_OUTOF10: 7
PAINLEVEL_OUTOF10: 7

## 2023-02-20 ASSESSMENT — PAIN - FUNCTIONAL ASSESSMENT: PAIN_FUNCTIONAL_ASSESSMENT: PREVENTS OR INTERFERES SOME ACTIVE ACTIVITIES AND ADLS

## 2023-02-20 ASSESSMENT — ENCOUNTER SYMPTOMS
SHORTNESS OF BREATH: 0
CONSTIPATION: 0
VOMITING: 0
ABDOMINAL PAIN: 0
COUGH: 0
NAUSEA: 0
BACK PAIN: 1

## 2023-02-20 ASSESSMENT — PAIN DESCRIPTION - ONSET: ONSET: ON-GOING

## 2023-02-20 ASSESSMENT — PAIN DESCRIPTION - DESCRIPTORS: DESCRIPTORS: THROBBING

## 2023-02-20 ASSESSMENT — PAIN DESCRIPTION - LOCATION
LOCATION: BACK
LOCATION: BACK

## 2023-02-20 ASSESSMENT — PAIN SCALES - WONG BAKER
WONGBAKER_NUMERICALRESPONSE: 2
WONGBAKER_NUMERICALRESPONSE: 2

## 2023-02-20 ASSESSMENT — PAIN DESCRIPTION - ORIENTATION
ORIENTATION: RIGHT;LEFT;MID
ORIENTATION: MID

## 2023-02-20 NOTE — CARE COORDINATION
Writer spoke to the patient regarding her DC plan. Patient is concerned that she cannot walk and my need further rehab prior to going home. Patient asks that I speak to her Markham Day ([phone]), to make SNF choices. Writer spoke to Carlyle ramos who will come to the hospital to obtain SNF list.   1250 patient Son and Granddaughter Kesha Henley is at the bedside and debating whether to take the patient home vs SNF placement. Patient and family are provided the SNF list and writer asked to be informed with a decision has been made. 1600 RN informed writer that patient is staying for Neuro Surg Re-eval tomorrow AM.  Writer asks that DC order be discontinued.

## 2023-02-20 NOTE — PROGRESS NOTES
[facility]  Office: [address], DO, Dada Plata, DO, Advid Vikikingston, DO, Mustapha Rosa Maria Blood, DO, Vaishali Becker MD, Tia Rosales MD, Arnav Schofield MD, Jim Baez MD,  Ines Wolf MD, Julius Griggs MD, Viviane Clifton, DO, Britney Griffith MD,  Ron Mendez MD, Alexis Gonsalves MD, Vitor Pedroza, DO, Glenda Washington MD, Lera Mcardle, MD, Stefania Reese, DO, Oskar Martinez MD, Dariel Mullisn MD, Keith Lombard, MD, Debbi Parmar MD, Yennifer Plaza, DO, Akua Avery MD, Delgado Bates MD, Milka Enriquez, CNP,  Minerva Nguyen, CNP, Asha Young, CNP, Shruthi Dumont, CNP,  Lisseth Gomez, [facility], Nakul Jones, CNP, Jeremiah Sorto, CNP, Federico Borrero, CNP, Morgan Ferrell, CNP, Sunni Bee, CNP, KADEEM GarzaC, Addison Tran, LAURA, Cornell Gram, CNP, Dana Lopez, CNP         [address]    Progress Note    2/20/2023    10:53 AM    Name:   Antonella Jaramillo  MRN:     [id number]     Acct:      [de-identified]   Room:   [address] [place] [address] Day:  2  Admit Date:  2/18/2023 11:26 AM    PCP:   PABLITO Garcia CNP  Code Status:  Full Code    Subjective:     C/C:   Chief Complaint   Patient presents with    Back Pain     Interval History Status:   Improved  Pain better controlled  Ambulated  Hoping to go home    Data Base Updates:  Potassium3.9   Hypokalemia corrected    Troponins have been flat  EKG normal  Doubt non-ST elevation MI    Brief History:     Patient was admitted thru ER with:  Laura Olea is a 81 y.o. female who presents with lower back pain for the past 2 weeks. Patient's grandchildren and son are in the room and provide additional history as patient is hard of hearing. Patient and patient's children report that she had a spinal fusion with Dr. Breann Trejo at [facility] last October. Patient became involved with therapy and noticed an improvement in her back pain at that time.   However for the past 2 weeks her  has been hospitalized and she had been sitting in a hard nonsupportive chair for several hours a day after which she experienced worsening upper back pain that has not been improved with physical therapy. Patient has been taking Motrin with minimal improvement in addition to tramadol which did not seem to help at all. Patient has a history of osteoporotic fracture without trauma in the past.  Patient denies any incontinence but her family reports that she has been avoiding using any laxatives for her normal constipation as it is painful for her to have to go to the restroom. Patient denies any numbness in the saddle region or in lower extremities. She reports that she feels like she is weak in her lower extremities because of the pain when she is walking. Her family reports that she is having difficulty transferring and getting around due to the pain. Patient normally uses a cane to ambulate. \"      Her back pain is better controlled at this time  She has had chronic urinary frequency  No dysuria or polyuria  No change in bowel habits  No paralysis or paresis     Initial database has included:  WBC4.1k/uL RBC3.30 Low m/uL Wnimisbhki42.0 Low       Troponin, High Qtfybjxdlvj29 High       Calcium8.6mg/dL   Ycsgja305ppyx/L   Potassium 2.8 Low Panic  mmol/L   Trszpytz921eogu/L   CR653rtwx/L   Anion Vgv3cfqs/L      Alkaline Hclkdnjdsod105 High U/L   ALT15U/L   AST30U/L   Total Bilirubin0.7      Imaging:  CT scan:    1. Global decreased bone mineral density. 2. Normal thoracic spine alignment with new/acute T10 insufficiency fracture   with approximately 5% loss of height. 3. Normal lumbar spine alignment with stable diffuse degenerative changes. 4. Compared to the prior 2022, imaging there is progressive height loss at   L1, with approximately 60% loss of height, L2, with approximately 10% loss of   height, and L3, with approximately 15% loss of height.   All of these are   concerning for new/acute insufficiency fractures. 5. Moderate-severe L3/L4, canal stenosis related to degenerative changes with   superimposed insufficiency fracture posterior cortical displacement. MRI:  Multiple compression fractures as above. Moderate disc marginal osteophyte   and spinal stenosis at L1-2. Multilevel bilateral neural foraminal narrowing. Fluid signal within the T10 vertebral body suggests it as being relatively acute is compared to the others. Troponins appear to be stable: The initial plan included:  Admit  Neurosurgical consultation  Pain control  Correct electrolyte abnormalities  Potassium supplementation  Anxiolytics as needed  Trend troponins  Rule out UTI: Frequency  Osteoporosis eval & f/u as scheduled on outpatient basis  Anemia w/u on outpatient basis is suggested    Blood Pressure - Monitor and control  DVT prophylaxis   The patient's status and plan have been discussed with the patient and family at the bedside     MRI:  Multiple compression fractures as above. Moderate disc marginal osteophyte   and spinal stenosis at L1-2. Multilevel bilateral neural foraminal narrowing. Fluid signal within the T10 vertebral body suggests it as being relatively acute is compared to the others. The patient's condition was stabilized  Discharge planning initiated    Past Medical History:   has a past medical history of Acute hepatitis C virus infection without hepatic coma, Anxiety and depression, Colitis, GERD (gastroesophageal reflux disease), HTN (hypertension), and Restless legs. Social History:   reports that she quit smoking about 43 years ago. Her smoking use included cigarettes. She has a 7.50 pack-year smoking history. She has never used smokeless tobacco. She reports current alcohol use. She reports that she does not use drugs.      Family History:   Family History   Problem Relation Age of Onset    Heart Disease Father        Review of Systems:     Review of Systems Constitutional:  Positive for activity change (Improved). Respiratory:  Negative for cough and shortness of breath. Cardiovascular:  Negative for chest pain and palpitations. Gastrointestinal:  Negative for abdominal pain, constipation (Resolved), nausea and vomiting. Genitourinary:  Negative for difficulty urinating, flank pain, frequency and hematuria. Musculoskeletal:  Positive for back pain (Better controlled) and gait problem (Ambulates with a cane). Neurological:  Positive for weakness. Psychiatric/Behavioral:  The patient is not nervous/anxious. Physical Examination:        Vitals  /71   Pulse 88   Temp 98.9 °F (37.2 °C) (Oral)   Resp 14   Wt 120 lb (54.4 kg)   SpO2 93%   BMI 24.24 kg/m²   Temp (24hrs), Av.9 °F (37.2 °C), Min:97.7 °F (36.5 °C), Max:99.3 °F (37.4 °C)    No results for input(s): POCGLU in the last 72 hours. Physical Exam  Vitals reviewed. Constitutional:       General: She is not in acute distress. Appearance: She is not diaphoretic. HENT:      Head: Normocephalic. Nose: Nose normal.   Eyes:      General: No scleral icterus. Conjunctiva/sclera: Conjunctivae normal.   Neck:      Trachea: No tracheal deviation. Cardiovascular:      Rate and Rhythm: Normal rate and regular rhythm. Pulmonary:      Effort: Pulmonary effort is normal. No respiratory distress. Breath sounds: Normal breath sounds. No wheezing or rales. Chest:      Chest wall: No tenderness. Abdominal:      General: There is no distension. Palpations: Abdomen is soft. Tenderness: There is no abdominal tenderness. Musculoskeletal:         General: No tenderness. Cervical back: Neck supple. Skin:     General: Skin is warm and dry. Neurological:      General: No focal deficit present. Mental Status: She is alert. Medications:      Allergies:  No Known Allergies    Current Meds:   Scheduled Meds:    docusate sodium  100 mg Oral Daily polyethylene glycol  17 g Oral Daily    vitamin D3  400 Units Oral Daily    calcium carbonate  1,000 mg Oral Daily    amLODIPine  5 mg Oral Daily    mirtazapine  7.5 mg Oral Nightly    therapeutic multivitamin-minerals  1 tablet Oral Daily    lisinopril  40 mg Oral Daily    pantoprazole  40 mg Oral QAM AC    venlafaxine  37.5 mg Oral BID    sodium chloride flush  5-40 mL IntraVENous 2 times per day    enoxaparin  40 mg SubCUTAneous Daily     Continuous Infusions:    sodium chloride       PRN Meds: HYDROmorphone, Baclofen, traMADol, sodium chloride flush, sodium chloride, potassium chloride **OR** potassium alternative oral replacement **OR** potassium chloride, ondansetron **OR** ondansetron, bisacodyl, acetaminophen **OR** acetaminophen, LORazepam, potassium chloride **OR** potassium alternative oral replacement **OR** potassium chloride    Data:     I/O (24Hr):     Intake/Output Summary (Last 24 hours) at 2/20/2023 1053  Last data filed at 2/19/2023 1845  Gross per 24 hour   Intake --   Output 650 ml   Net -650 ml       Labs:  Hematology:  Recent Labs     02/18/23  1245 02/19/23  0105   WBC 4.1 5.7   RBC 3.30* 2.92*   HGB 11.0* 9.8*   HCT 31.8* 28.9*   MCV 96.4 99.0   MCH 33.3 33.6*   MCHC 34.6 33.9   RDW 12.8 12.7    154   MPV 9.9 9.6     Chemistry:  Recent Labs     02/18/23  1245 02/18/23  1419 02/19/23  0105 02/19/23  0929 02/19/23  1624 02/20/23  0414     --  139  --   --  138   K 2.8*  --  3.3*  --   --  3.9     --  104  --   --  104   CO2 27  --  27  --   --  27   GLUCOSE 121*  --  101*  --   --  96   BUN 5*  --  4*  --   --  6*   CREATININE 0.49*  --  0.46*  --   --  0.55   MG  --  1.8 1.7  --   --   --    ANIONGAP 9  --  8*  --   --  7*   LABGLOM >60  --  >60  --   --  >60   CALCIUM 8.6  --  8.5*  --   --  8.6   TROPHS 19* 19* 20* 21* 22*  --      Recent Labs     02/18/23  1245   PROT 6.1*   LABALBU 3.5   AST 30   ALT 15   ALKPHOS 151*   BILITOT 0.7     ABG:No results found for: POCPH, PHART, PH, POCPCO2, EWO3UFL, PCO2, POCPO2, PO2ART, PO2, POCHCO3, ZMC5YIS, HCO3, NBEA, PBEA, BEART, BE, THGBART, THB, FWW2RUL, KUVN1FGQ, S5LGGGHU, O2SAT, FIO2  Lab Results   Component Value Date/Time    SPECIAL NOT REPORTED 02/08/2021 09:45 AM     Lab Results   Component Value Date/Time    CULTURE NO SIGNIFICANT GROWTH 07/07/2022 02:30 PM       Radiology:  CT THORACIC SPINE WO CONTRAST    Result Date: 2/18/2023  1. Global decreased bone mineral density. 2. Normal thoracic spine alignment with new/acute T10 insufficiency fracture with approximately 5% loss of height. 3. Normal lumbar spine alignment with stable diffuse degenerative changes. 4. Compared to the prior 2022, imaging there is progressive height loss at L1, with approximately 60% loss of height, L2, with approximately 10% loss of height, and L3, with approximately 15% loss of height. All of these are concerning for new/acute insufficiency fractures. 5. Moderate-severe L3/L4, canal stenosis related to degenerative changes with superimposed insufficiency fracture posterior cortical displacement. CT LUMBAR SPINE WO CONTRAST    Result Date: 2/18/2023  1. Global decreased bone mineral density. 2. Normal thoracic spine alignment with new/acute T10 insufficiency fracture with approximately 5% loss of height. 3. Normal lumbar spine alignment with stable diffuse degenerative changes. 4. Compared to the prior 2022, imaging there is progressive height loss at L1, with approximately 60% loss of height, L2, with approximately 10% loss of height, and L3, with approximately 15% loss of height. All of these are concerning for new/acute insufficiency fractures. 5. Moderate-severe L3/L4, canal stenosis related to degenerative changes with superimposed insufficiency fracture posterior cortical displacement. MRI THORACIC SPINE WO CONTRAST    Result Date: 2/18/2023  Multiple compression fractures as above.   Fluid signal within the T10 vertebral body suggests it as being relatively acute is compared to the others. MRI LUMBAR SPINE WO CONTRAST    Result Date: 2/18/2023  Multiple compression fractures as above. Moderate disc marginal osteophyte and spinal stenosis at L1-2. Multilevel bilateral neural foraminal narrowing. XR CHEST PORTABLE    Result Date: 2/18/2023  1. COPD. No evidence of acute congestive heart failure. 2. Indeterminate bilateral peripheral pulmonary opacities which could represent nodules and possible cavitary lesion versus subsegmental consolidation. When clinically able, CT thorax without contrast can be performed to further evaluate the findings. Assessment:        Primary Problem  Closed T10 spinal fracture [facility])    Active Hospital Problems    Diagnosis Date Noted    Compression fx, lumbar spine, closed, initial encounter ([facility] [address] [S32.000A] 02/18/2023     Priority: Medium    Closed T10 spinal fracture ([facility] [address] [S22.079A] 02/18/2023     Priority: Medium    Spinal stenosis of lumbar region without neurogenic claudication [M48.061] 02/18/2023     Priority: Medium    Hypokalemia [E87.6] 02/18/2023     Priority: Medium    Troponin level elevated [R77.8] 02/18/2023     Priority: Medium    Age-related osteoporosis with current pathological fracture [M80.00XA] 10/28/2022     Priority: Medium    Anemia, normocytic normochromic [D64.9] 11/17/2020    HTN (hypertension) [I10] 12/04/2014         Plan:        Neurosurgical eval & f/u as scheduled   Pain control  Correct electrolyte abnormalities  Potassium supplementation  Anxiolytics as needed  Rule out UTI: Frequency (culture pending)  Osteoporosis eval & f/u as scheduled on outpatient basis  Calcium  Vitamin D  Anemia w/u on outpatient basis is suggested    Blood Pressure - Monitor and control  Laxatives as needed for constipation  PT/OT eval  DVT prophylaxis   Discharge planning  Will discharge when arrangements complete and ok with other services.   Follow-up with PCP in one week, PABLITO Landrum - CNP  Notify PCP of discharge       IP CONSULT TO NEUROSURGERY  IP CONSULT TO INTERNAL MEDICINE    Tatyana Ortiz DO  2/20/2023  10:53 AM

## 2023-02-20 NOTE — PROGRESS NOTES
Occupational Therapy  Facility/Department: [address] NEURO  Occupational Therapy Initial Assessment    Name: Flower Eugene  : 1940  MRN: [id number]  Date of Service: 2023    Chief Complaint   Patient presents with    Back Pain         Discharge Recommendations:  Further therapy recommended at discharge. The patient should be able to tolerate at least 3 hours of therapy per day over 5 days or 15 hours over 7 days. This patient may benefit from a Physical Medicine and Rehab consult. Patient would benefit from continued therapy after discharge  OT Equipment Recommendations  Equipment Needed: Yes  Mobility Devices: ADL Assistive Devices  ADL Assistive Devices: Toileting - 3-in-1 Commode;Transfer Tub Bench;Reacher;Sock-Aid Hard;Long-handled Shoe Horn;Long-handled Sponge       Patient Diagnosis(es): The primary encounter diagnosis was Closed fracture of tenth thoracic vertebra, unspecified fracture morphology, initial encounter ([facility] [address] Diagnoses of Other closed fracture of third lumbar vertebra, initial encounter [facility]), Spinal stenosis of lumbar region, unspecified whether neurogenic claudication present, Hypokalemia, and Elevated troponin were also pertinent to this visit. Past Medical History:  has a past medical history of Acute hepatitis C virus infection without hepatic coma, Anxiety and depression, Colitis, GERD (gastroesophageal reflux disease), HTN (hypertension), and Restless legs. Past Surgical History:  has a past surgical history that includes Hysterectomy; Appendectomy; and Spinal fusion. Assessment   Performance deficits / Impairments: Decreased functional mobility ; Decreased ADL status; Decreased strength;Decreased safe awareness;Decreased balance;Decreased high-level IADLs  Assessment: Pt agreeable to OT eval this date. Pt donned LSO appropriately with Min A this date.  Pt engaged in bed mobility, functional transfers, and functional mobility with CGA-Min A, noting deficits in BLE strength and limitations secondary to pain. Pt will benefit from continued OT services to address deficits in strength, safety awareness, and balance through use of skilled therapeutic interventions to increase functional independence and safety with ADLs/IADLs and functional transfers/mobility. Prognosis: Good  Decision Making: Medium Complexity  REQUIRES OT FOLLOW-UP: Yes  Activity Tolerance  Activity Tolerance: Patient limited by pain        Plan   Occupational Therapy Plan  Times Per Week: 4-5 x/wk  Current Treatment Recommendations: Strengthening, Balance training, Functional mobility training, Endurance training, Pain management, Safety education & training, Patient/Caregiver education & training, Equipment evaluation, education, & procurement, Self-Care / ADL, Home management training     Restrictions  Restrictions/Precautions  Restrictions/Precautions: Fall Risk, Up as Tolerated  Required Braces or Orthoses?: Yes  Required Braces or Orthoses  Spinal Other: LSO present in room and pt reports wearing at all times  Position Activity Restriction  Other position/activity restrictions: T10 fx, CTLS rec: None; \"Recommend PT and OT   Brace was brought in from home \"    Subjective   General  Patient assessed for rehabilitation services?: Yes  Family / Caregiver Present: Yes (son)  General Comment  Comments: RN ok'd pt for OT session. Pt agreeable and very pleasant/cooperative throughout. Pt reports minimal pain at rest however with activity back pain increases to 9/10. RN was notified at end of session. Pt was repositioned for comfort at session's end.     Social/Functional History  Social/Functional History  Lives With: Spouse  Type of Home: House  Home Layout: One level, Laundry in basement  Home Access: Stairs to enter with rails  Entrance Stairs - Number of Steps: 4  Entrance Stairs - Rails: Both  Bathroom Shower/Tub: Tub/Shower unit  Bathroom Toilet: Handicap height  Bathroom Equipment: Grab bars around toilet  Home Equipment: Arminda Andrew, [address] Help From: Family  ADL Assistance: Independent  Homemaking Assistance: Independent  Homemaking Responsibilities: Yes  Ambulation Assistance: Independent  Transfer Assistance: Independent  Active : No  Patient's  Info: Family drives patient  Occupation: Retired  Type of Occupation: seamstress  Additional Comments: Pt reports being independent with all ADLs and homemaking tasks with exceptions of laundry prior to this admission. Per son, pt can have 24/7 assistance available upon discharge between family members       Objective   Safety Devices  Type of Devices: Call light within reach; Left in bed;Nurse notified; Bed alarm in place  Restraints  Restraints Initially in Place: No    Bed Mobility Training  Bed Mobility Training: Yes  Overall Level of Assistance: Minimum assistance; Additional time; Adaptive equipment (pt engaged in supine > sit utilizing log roll technique appropriately with CGA and increased time, relying heavily on bed rail. Pt required Min A for BLE progression when returning to supine this date. HOB elevated ~35 degrees throughout maneuvers)  Interventions: Safety awareness training;Verbal cues  Rolling: Contact-guard assistance; Additional time  Supine to Sit: Additional time;Contact-guard assistance; Adaptive equipment  Sit to Supine: Minimum assistance; Adaptive equipment; Additional time  Balance  Sitting: With support (grossly SBA with occasional CGA for support ~15 min)  Standing: With support (CGA static and Min A dynamic with RW utilized for support ~2 min)  Transfer Training  Transfer Training: Yes  Overall Level of Assistance: Contact-guard assistance; Additional time; Adaptive equipment (pt completed 2 sit <> stand transfers from EOB, initially completing prior to writer's readiness and with no DME. On second trial pt completed with CGA and use of RW for support. BLE tremoring noted throughout.  Pt reports increased pain with all movement)  Interventions: Safety awareness training;Verbal cues  Sit to Stand: Contact-guard assistance; Additional time; Adaptive equipment  Stand to Sit: Contact-guard assistance; Additional time; Adaptive equipment  Gait  Overall Level of Assistance: Minimum assistance; Additional time; Adaptive equipment (pt engaged in short functional mobility at bedside with Min A and use of RW; pt demonstrates significant BLE shaking with near buckling however no actual buckling occurred)  Interventions: Safety awareness training;Verbal cues    AROM: Within functional limits  Strength: Generally decreased, functional (B shoulders 3+/5, B bicep/tricep 4/5, B  4/5)  Coordination: Within functional limits  Tone: Normal  Sensation: Intact    ADL  Feeding: Modified independent ;Setup  Grooming: Modified independent ;Setup  UE Bathing: Minimal assistance;Setup  LE Bathing: Minimal assistance;Setup; Increased time to complete  UE Dressing: Minimal assistance;Setup; Increased time to complete  UE Dressing Skilled Clinical Factors: pt donned LSO while seated at EOB, standing to appropriately adjust with Min A this date d/t limited reach and pain  LE Dressing: Setup; Moderate assistance; Increased time to complete  Toileting: Moderate assistance;Setup; Increased time to complete             Vision  Vision: Impaired  Vision Exceptions: Wears glasses at all times  Hearing  Hearing: Exceptions to [facility]  Hearing Exceptions: Hard of hearing/hearing concerns (pt reports R hearing loss is worse than L)  Cognition  Overall Cognitive Status: Exceptions  Safety Judgement: Decreased awareness of need for assistance                 Education Provided Comments: Pt ed on OT role, OT POC, safety awareness, log roll tech, transfer training, DME use, pain , adaptive ADL tech, fall prevention, and importance of continued OT.  Good return    LUE AROM (degrees)  LUE AROM : WFL  Left Hand AROM (degrees)  Left Hand AROM: WFL  RUE AROM (degrees)  RUE AROM : WFL  Right Hand AROM (degrees)  Right Hand AROM: WFL       Hand Dominance  Hand Dominance: Right            AM-PAC Score        AM-PAC Inpatient Daily Activity Raw Score: 18 (02/20/23 1713)  AM-PAC Inpatient ADL T-Scale Score : 38.66 (02/20/23 1713)  ADL Inpatient CMS 0-100% Score: 46.65 (02/20/23 1713)  ADL Inpatient CMS G-Code Modifier : CK (02/20/23 1713)    Goals  Short Term Goals  Time Frame for Short Term Goals: By discharge, pt will:  Short Term Goal 1: Demo Mod I for functional transfers and functional mobility with use of LRD for engagement in ADLs/IADLs  Short Term Goal 2: Demo Mod I for UB ADLs, including donning/doffing/adjusting brace, with setup PRN  Short Term Goal 3: Demo CGA for LB ADLs and toileting tasks with setup and AE use PRN  Short Term Goal 4: Demo 6 min dynamic standing and reaching outside JA with unilateral hand release and SBA for improved balance during ADL/IADL participation  Short Term Goal 5: Demo good safety awareness throughout therapy session with 0 VCs       Therapy Time   Individual Concurrent Group Co-treatment   Time In 1411         Time Out 1440         Minutes 29         Timed Code Treatment Minutes: 25 Minutes       MERRY Romero/L

## 2023-02-20 NOTE — PROGRESS NOTES
Neurosurgery LEENA/Resident    Daily Progress Note   CC:  Chief Complaint   Patient presents with    Back Pain     2/20/2023  11:44 AM    Chart reviewed. No acute events overnight. No new complaints. Denies thoracic and lumbar back pain when palpated, reporting left flank pain, no active UTI noted     Vitals:    02/20/23 0309 02/20/23 0420 02/20/23 0800 02/20/23 1140   BP:  122/73 135/71 (!) 120/56   Pulse:  85 88 77   Resp: 16 10 14 13   Temp:  99.2 °F (37.3 °C) 98.9 °F (37.2 °C) 98.9 °F (37.2 °C)   TempSrc:  Oral Oral Oral   SpO2:  95% 93% 96%   Weight:           PE:   AOx3  Motor   L deltoid 5/5; R deltoid 5/5  L biceps 5/5; R biceps 5/5  L triceps 5/5; R triceps 5/5  L wrist extension 5/5; R wrist extension 5/5  L intrinsics 5/5; R intrinsics 5/5      L iliopsoas 5/5 , R iliopsoas 5/5  L quadriceps 5/5; R quadriceps 5/5  L Dorsiflexion 5/5; R dorsiflexion 5/5  L Plantarflexion 5/5; R plantarflexion 5/5  L EHL 5/5; R EHL 5/5    Sensation: intact       Lab Results   Component Value Date    WBC 5.7 02/19/2023    HGB 9.8 (L) 02/19/2023    HCT 28.9 (L) 02/19/2023     02/19/2023    CHOL 145 06/17/2019    TRIG 88 06/17/2019    HDL 78 (A) 06/17/2019    ALT 15 02/18/2023    AST 30 02/18/2023     02/20/2023    K 3.9 02/20/2023     02/20/2023    CREATININE 0.55 02/20/2023    BUN 6 (L) 02/20/2023    CO2 27 02/20/2023    TSH 0.928 05/26/2022    LABA1C 5.2 02/14/2022       A/P  81 y.o. female who presents with acute T10 fracture    Recommend PT and OT   Brace was brought in from home   Neuro checks per floor protocol  Encourage IS       Please contact neurosurgery with any changes in patients neurologic status.        Enriqueta Wells CNP  2/20/23  11:44 AM

## 2023-02-20 NOTE — PROGRESS NOTES
Physician Progress Note      Edelmira Rebolledo  CSN #:                  [id number]  :                       1940  ADMIT DATE:       2023 11:26 AM  DISCH DATE:  RESPONDING  PROVIDER #:        Tatyana Ortiz DO          QUERY TEXT:    Pt admitted with back pain/T10 compression fracture noted was sitting in hard   chair and had back pain  Pt noted to have a history of osteoporotic fracture   without trauma in the past. CT thoracic spine showed decreased bone mineral   density and a T10 insufficiency fracture. Calcium level 8.5 on   admission. Please clarify one of the following. Neurosurgery noting  a   hyperextension injury :    The medical record reflects the following:  Risk Factors: history of osteoporotic fracture  Clinical Indicators: admitted with back pain/T10 compression fracture noted   was sitting in hard chair and had back pain  Pt noted to have a history of   osteoporotic fracture without trauma in the past. CT thoracic spine showed   decreased bone mineral density and a T10 insufficiency fracture. Calcium level   8.5 on admission  Treatment: vitamins, neurosurgery consult    Thank Violeta Vieira RN BSN  CCDS  Email [email or web address]  Cell [phone]  office hours M-F 6am to 2:30pm  Options provided:  -- Pathological T10 fracture  -- Osteoporotic T10  Fracture  -- Osteoporotic T10  fracture following  a hyperextension injury l which would   not usually break a normal, healthy bone  -- Traumatic T10 fracture  -- Other - I will add my own diagnosis  -- Disagree - Not applicable / Not valid  -- Disagree - Clinically unable to determine / Unknown  -- Refer to Clinical Documentation Reviewer    PROVIDER RESPONSE TEXT:    This patient has an osteoporotic T10 fracture. Query created by:  Yee Granda on 2023 10:00 AM      Electronically signed by:  Tatyana Ortiz DO 2023 5:38 PM

## 2023-02-21 PROCEDURE — 6370000000 HC RX 637 (ALT 250 FOR IP): Performed by: INTERNAL MEDICINE

## 2023-02-21 PROCEDURE — 1200000000 HC SEMI PRIVATE

## 2023-02-21 PROCEDURE — 94761 N-INVAS EAR/PLS OXIMETRY MLT: CPT

## 2023-02-21 PROCEDURE — 36415 COLL VENOUS BLD VENIPUNCTURE: CPT

## 2023-02-21 PROCEDURE — 97116 GAIT TRAINING THERAPY: CPT

## 2023-02-21 PROCEDURE — 82306 VITAMIN D 25 HYDROXY: CPT

## 2023-02-21 PROCEDURE — 2700000000 HC OXYGEN THERAPY PER DAY

## 2023-02-21 PROCEDURE — 99233 SBSQ HOSP IP/OBS HIGH 50: CPT | Performed by: INTERNAL MEDICINE

## 2023-02-21 PROCEDURE — 2580000003 HC RX 258: Performed by: INTERNAL MEDICINE

## 2023-02-21 PROCEDURE — 99232 SBSQ HOSP IP/OBS MODERATE 35: CPT | Performed by: NEUROLOGICAL SURGERY

## 2023-02-21 PROCEDURE — 97530 THERAPEUTIC ACTIVITIES: CPT

## 2023-02-21 PROCEDURE — 6360000002 HC RX W HCPCS: Performed by: INTERNAL MEDICINE

## 2023-02-21 RX ORDER — TRAMADOL HYDROCHLORIDE 50 MG/1
50 TABLET ORAL EVERY 6 HOURS PRN
Status: DISCONTINUED | OUTPATIENT
Start: 2023-02-21 | End: 2023-02-25 | Stop reason: HOSPADM

## 2023-02-21 RX ADMIN — PANTOPRAZOLE SODIUM 40 MG: 40 TABLET, DELAYED RELEASE ORAL at 09:48

## 2023-02-21 RX ADMIN — SODIUM CHLORIDE, PRESERVATIVE FREE 10 ML: 5 INJECTION INTRAVENOUS at 21:42

## 2023-02-21 RX ADMIN — TRAMADOL HYDROCHLORIDE 50 MG: 50 TABLET, COATED ORAL at 03:42

## 2023-02-21 RX ADMIN — LISINOPRIL 40 MG: 20 TABLET ORAL at 09:48

## 2023-02-21 RX ADMIN — BACLOFEN 5 MG: 5 TABLET ORAL at 18:13

## 2023-02-21 RX ADMIN — TRAMADOL HYDROCHLORIDE 50 MG: 50 TABLET, COATED ORAL at 16:00

## 2023-02-21 RX ADMIN — ENOXAPARIN SODIUM 40 MG: 100 INJECTION SUBCUTANEOUS at 09:49

## 2023-02-21 RX ADMIN — ACETAMINOPHEN 650 MG: 325 TABLET ORAL at 18:13

## 2023-02-21 RX ADMIN — BACLOFEN 5 MG: 5 TABLET ORAL at 08:02

## 2023-02-21 RX ADMIN — AMLODIPINE BESYLATE 5 MG: 5 TABLET ORAL at 09:48

## 2023-02-21 RX ADMIN — CHOLECALCIFEROL TAB 10 MCG (400 UNIT) 400 UNITS: 10 TAB at 09:48

## 2023-02-21 RX ADMIN — VENLAFAXINE HYDROCHLORIDE 37.5 MG: 37.5 CAPSULE, EXTENDED RELEASE ORAL at 21:39

## 2023-02-21 RX ADMIN — MIRTAZAPINE 7.5 MG: 15 TABLET, FILM COATED ORAL at 21:39

## 2023-02-21 RX ADMIN — SODIUM CHLORIDE, PRESERVATIVE FREE 10 ML: 5 INJECTION INTRAVENOUS at 09:49

## 2023-02-21 RX ADMIN — BACLOFEN 5 MG: 5 TABLET ORAL at 21:39

## 2023-02-21 RX ADMIN — DOCUSATE SODIUM 100 MG: 100 CAPSULE ORAL at 09:48

## 2023-02-21 RX ADMIN — POLYETHYLENE GLYCOL 3350 17 G: 17 POWDER, FOR SOLUTION ORAL at 09:49

## 2023-02-21 RX ADMIN — ANTACID TABLETS 1000 MG: 500 TABLET, CHEWABLE ORAL at 21:39

## 2023-02-21 RX ADMIN — TRAMADOL HYDROCHLORIDE 50 MG: 50 TABLET, COATED ORAL at 09:49

## 2023-02-21 RX ADMIN — VENLAFAXINE HYDROCHLORIDE 37.5 MG: 37.5 CAPSULE, EXTENDED RELEASE ORAL at 09:49

## 2023-02-21 RX ADMIN — Medication 1 TABLET: at 09:48

## 2023-02-21 ASSESSMENT — ENCOUNTER SYMPTOMS
ABDOMINAL PAIN: 0
SHORTNESS OF BREATH: 0
BACK PAIN: 1
CONSTIPATION: 0
COUGH: 0
WHEEZING: 0

## 2023-02-21 ASSESSMENT — PAIN DESCRIPTION - ONSET: ONSET: ON-GOING

## 2023-02-21 ASSESSMENT — PAIN DESCRIPTION - LOCATION: LOCATION: BACK

## 2023-02-21 ASSESSMENT — PAIN SCALES - GENERAL
PAINLEVEL_OUTOF10: 0
PAINLEVEL_OUTOF10: 10

## 2023-02-21 ASSESSMENT — PAIN SCALES - WONG BAKER: WONGBAKER_NUMERICALRESPONSE: 2

## 2023-02-21 NOTE — PLAN OF CARE
Problem: Discharge Planning  Goal: Discharge to home or other facility with appropriate resources  2/20/2023 2209 by Freddie Rosales RN  Outcome: Progressing     Problem: Pain  Goal: Verbalizes/displays adequate comfort level or baseline comfort level  2/20/2023 2209 by Freddie Rosales RN  Outcome: Progressing     Problem: Skin/Tissue Integrity  Goal: Absence of new skin breakdown  Description: 1. Monitor for areas of redness and/or skin breakdown  2. Assess vascular access sites hourly  3. Every 4-6 hours minimum:  Change oxygen saturation probe site  4. Every 4-6 hours:  If on nasal continuous positive airway pressure, respiratory therapy assess nares and determine need for appliance change or resting period.   2/20/2023 2209 by Freddie Rosales RN  Outcome: Progressing     Problem: Safety - Adult  Goal: Free from fall injury  2/20/2023 2209 by Freddie Rosales RN  Outcome: Progressing     Problem: ABCDS Injury Assessment  Goal: Absence of physical injury  2/20/2023 2209 by Freddie Rosales RN  Outcome: Progressing

## 2023-02-21 NOTE — PROGRESS NOTES
Physical Therapy  Facility/Department: [address] NEURO  Daily Treatment Note    Name: Sindy Giron  : 1940  MRN: [id number]  Date of Service: 2023    Discharge Recommendations:  Patient would benefit from continued therapy after discharge   PT Equipment Recommendations  Equipment Needed: No (CTA; pt requires skilled assistance to ambulate)      Patient Diagnosis(es): The primary encounter diagnosis was Closed fracture of tenth thoracic vertebra, unspecified fracture morphology, initial encounter ([facility] [address] Diagnoses of Other closed fracture of third lumbar vertebra, initial encounter [facility]), Spinal stenosis of lumbar region, unspecified whether neurogenic claudication present, Hypokalemia, and Elevated troponin were also pertinent to this visit. Past Medical History:  has a past medical history of Acute hepatitis C virus infection without hepatic coma, Anxiety and depression, Colitis, GERD (gastroesophageal reflux disease), HTN (hypertension), and Restless legs. Past Surgical History:  has a past surgical history that includes Hysterectomy; Appendectomy; and Spinal fusion. Assessment   Body Structures, Functions, Activity Limitations Requiring Skilled Therapeutic Intervention: Decreased functional mobility ; Decreased ROM; Decreased body mechanics; Decreased strength;Decreased balance; Increased pain;Decreased posture  Assessment: Pt ambulated with RW x 20 ft with Jeff with LSO donned sitting at EOB. Pt required Jeff for supine<>sit and sit<>stand transfers. Pt requires increased time and effort to complete all movements d/t back pain. Pt is very motivated to progress. Pt would benefit from continued PT. Therapy Prognosis: Good  Activity Tolerance  Activity Tolerance: Patient limited by pain  Activity Tolerance Comments: pt motivated but limited by back pain.      Plan   Physcial Therapy Plan  General Plan:  (5-6x/wk)  Current Treatment Recommendations: Strengthening, ROM, Balance training, Functional mobility training, Transfer training, Endurance training, Gait training, Stair training, Pain management, Home exercise program, Safety education & training, Patient/Caregiver education & training, Equipment evaluation, education, & procurement, Positioning, Therapeutic activities  Safety Devices  Type of Devices: Call light within reach, Gait belt, Patient at risk for falls, Left in bed, Nurse notified, Bed alarm in place  Restraints  Restraints Initially in Place: No     Restrictions  Restrictions/Precautions  Restrictions/Precautions: Fall Risk, Up as Tolerated  Required Braces or Orthoses?: Yes  Required Braces or Orthoses  Spinal Other: LSO present in room and pt reports donning standing at OOB, but donned sitting at EOB and tightened in standing,  pt wore previous to admission  Position Activity Restriction  Other position/activity restrictions: T10 fx, CTLS rec: None; \"Recommend PT and OT   Brace was brought in from home \"     Subjective   General  Chart Reviewed: Yes  Response To Previous Treatment: Patient with no complaints from previous session. Family / Caregiver Present: No  General Comment  Comments: Pt retired to L sidelying with pillow between knees for comfort with call light and bed alarm set. RN notified  Subjective  Subjective: RN and pt agreeable to PT. Pt supine upon arrival.  Pt pleasant and cooperative. Pt having 9/10 pain; RN notified. Cognition   Orientation  Overall Orientation Status: Within Functional Limits  Cognition  Overall Cognitive Status: Exceptions  Safety Judgement: Decreased awareness of need for assistance     Objective   Bed mobility  Rolling to Left: Minimal assistance  Supine to Sit: Minimal assistance (HOB elevated, use of bed rails; pt moved very slowly d/t severe back pain, but agreeable to get OOB and ambulate.  Increased time and effort to complete)  Sit to Supine: Minimal assistance (assistance needed to get LEs back onto the bed)  Scooting: Stand by assistance (pt scooting back into bed with use of bed rail)  Bed Mobility Comments: Assessed with HOB slightly elevated. Log rolling technique used with use of bed rail. Pt state, \"I need to go very slowly. \"  Pt maintained sitting EOB with CGA. Donned LSO sitting at EOB. Transfers  Sit to Stand: Minimal Assistance  Stand to Sit: Minimal Assistance  Bed to Chair: Minimal assistance  Comment: Tightened LSO in standing to pt comfort. Assessed standing to RW x 3 trials. Increased time and effort to complete. Pt resting B UEs on RW for transfers as pt has increasing back pain when pushing from recliner. Ambulation  Device: Rolling Walker  Other Apparatus: Wheelchair follow  Assistance: Minimal assistance  Quality of Gait: mildly flexed posture, tends to keep the rwalker too far out in front of her  Gait Deviations: Slow Kalyn;Decreased step length;Decreased step height;Decreased arm swing;Decreased head and trunk rotation  Distance: 3 ft, seated rest period, 20 ft, seated rest period, 3 ft  Comments: Pt was unable to let go of RW to remain sitting in recliner to move recliner backwards. Pt maintained B UEs on RW t/o.      Balance  Posture: Fair  Sitting - Static: Good  Sitting - Dynamic: Fair  Standing - Static: Fair  Standing - Dynamic: Fair  Comments: assessed sitting at EOB and standing at RW  Exercise Treatment: deferred d/t back pain and pt positioned in sidelying for comfort        OutComes Score  AM-PAC Score  AM-PAC Inpatient Mobility Raw Score : 15 (02/21/23 1553)  AM-PAC Inpatient T-Scale Score : 39.45 (02/21/23 1553)  Mobility Inpatient CMS 0-100% Score: 57.7 (02/21/23 1553)  Mobility Inpatient CMS G-Code Modifier : CK (02/21/23 1553)     Goals  Short Term Goals  Time Frame for Short Term Goals: 14 visits  Short Term Goal 1: independent bed mobility  Short Term Goal 2: independent transfers  Short Term Goal 3: independent gait with rwalker x 50'  Short Term Goal 4: stair ambulation x 4 steps with 1 HR with min A+1  Patient Goals   Patient Goals : get rid of back pain       Education  Patient Education  Education Given To: Patient  Education Provided: Role of Therapy;Plan of Care;Transfer Training; Fall Prevention Strategies  Education Method: Verbal  Barriers to Learning: None  Education Outcome: Continued education needed      Therapy Time   Individual Concurrent Group Co-treatment   Time In 5732         Time Out 6180         Minutes 28         Timed Code Treatment Minutes: 2135 Misa Harp, Caitlin Perdomo, JASMYNE

## 2023-02-21 NOTE — PROGRESS NOTES
Neurosurgery LEENA/Resident    Daily Progress Note   CC:  Chief Complaint   Patient presents with    Back Pain     2/21/2023  11:16 AM    Chart reviewed. No acute events overnight. No new complaints. Resting in bed she does report this morning that she was having thoracic back pain, did not get out of bed yesterday    Vitals:    02/21/23 0000 02/21/23 0330 02/21/23 0412 02/21/23 0801   BP: 134/60 131/75  (!) 152/80   Pulse: 89 79  98   Resp: 21 17 20 23   Temp: 98.7 °F (37.1 °C) 98 °F (36.7 °C)  98 °F (36.7 °C)   TempSrc: Oral Oral  Oral   SpO2: 98% 100%  98%   Weight:           PE:   AOx3   Motor   L deltoid 5/5; R deltoid 5/5  L biceps 5/5; R biceps 5/5  L triceps 5/5; R triceps 5/5  L wrist extension 5/5; R wrist extension 5/5  L intrinsics 5/5; R intrinsics 5/5      L iliopsoas 5/5 , R iliopsoas 5/5  L quadriceps 5/5; R quadriceps 5/5  L Dorsiflexion 5/5; R dorsiflexion 5/5  L Plantarflexion 5/5; R plantarflexion 5/5  L EHL 5/5; R EHL 5/5      Sensation: intact       Lab Results   Component Value Date    WBC 5.7 02/19/2023    HGB 9.8 (L) 02/19/2023    HCT 28.9 (L) 02/19/2023     02/19/2023    CHOL 145 06/17/2019    TRIG 88 06/17/2019    HDL 78 (A) 06/17/2019    ALT 15 02/18/2023    AST 30 02/18/2023     02/20/2023    K 3.9 02/20/2023     02/20/2023    CREATININE 0.55 02/20/2023    BUN 6 (L) 02/20/2023    CO2 27 02/20/2023    TSH 0.928 05/26/2022    LABA1C 5.2 02/14/2022         A/P  81 y.o. female who presents with acute T10 fracture    Ok to work with PT and OT as needed to assess pain and mobility  Brace brought in from home ok to wear   Further recommendations after patient is seen by neurosurgeon     Please contact neurosurgery with any changes in patients neurologic status.        Lencho Emmanuel CNP  2/21/23  11:16 AM

## 2023-02-22 ENCOUNTER — APPOINTMENT (OUTPATIENT)
Dept: GENERAL RADIOLOGY | Age: 83
End: 2023-02-22
Payer: MEDICARE

## 2023-02-22 ENCOUNTER — ANESTHESIA EVENT (OUTPATIENT)
Dept: OPERATING ROOM | Age: 83
End: 2023-02-22
Payer: MEDICARE

## 2023-02-22 ENCOUNTER — ANESTHESIA (OUTPATIENT)
Dept: OPERATING ROOM | Age: 83
End: 2023-02-22
Payer: MEDICARE

## 2023-02-22 LAB
25(OH)D3 SERPL-MCNC: 50.1 NG/ML
ANION GAP SERPL CALCULATED.3IONS-SCNC: 7 MMOL/L (ref 9–17)
BUN SERPL-MCNC: 7 MG/DL (ref 8–23)
CALCIUM SERPL-MCNC: 8.6 MG/DL (ref 8.6–10.4)
CHLORIDE SERPL-SCNC: 103 MMOL/L (ref 98–107)
CO2 SERPL-SCNC: 30 MMOL/L (ref 20–31)
CREAT SERPL-MCNC: 0.59 MG/DL (ref 0.5–0.9)
GFR SERPL CREATININE-BSD FRML MDRD: >60 ML/MIN/1.73M2
GLUCOSE SERPL-MCNC: 93 MG/DL (ref 70–99)
INR PPP: 1.1
PARTIAL THROMBOPLASTIN TIME: 26.3 SEC (ref 20.5–30.5)
POTASSIUM SERPL-SCNC: 4.1 MMOL/L (ref 3.7–5.3)
PROTHROMBIN TIME: 11.7 SEC (ref 9.1–12.3)
SODIUM SERPL-SCNC: 140 MMOL/L (ref 135–144)

## 2023-02-22 PROCEDURE — 6370000000 HC RX 637 (ALT 250 FOR IP): Performed by: INTERNAL MEDICINE

## 2023-02-22 PROCEDURE — 2709999900 HC NON-CHARGEABLE SUPPLY: Performed by: ORTHOPAEDIC SURGERY

## 2023-02-22 PROCEDURE — 7100000000 HC PACU RECOVERY - FIRST 15 MIN: Performed by: ORTHOPAEDIC SURGERY

## 2023-02-22 PROCEDURE — 3600000003 HC SURGERY LEVEL 3 BASE: Performed by: ORTHOPAEDIC SURGERY

## 2023-02-22 PROCEDURE — 71045 X-RAY EXAM CHEST 1 VIEW: CPT

## 2023-02-22 PROCEDURE — 85610 PROTHROMBIN TIME: CPT

## 2023-02-22 PROCEDURE — 3700000001 HC ADD 15 MINUTES (ANESTHESIA): Performed by: ORTHOPAEDIC SURGERY

## 2023-02-22 PROCEDURE — C1713 ANCHOR/SCREW BN/BN,TIS/BN: HCPCS | Performed by: ORTHOPAEDIC SURGERY

## 2023-02-22 PROCEDURE — 2580000003 HC RX 258

## 2023-02-22 PROCEDURE — 0PU43JZ SUPPLEMENT THORACIC VERTEBRA WITH SYNTHETIC SUBSTITUTE, PERCUTANEOUS APPROACH: ICD-10-PCS | Performed by: ORTHOPAEDIC SURGERY

## 2023-02-22 PROCEDURE — 2500000003 HC RX 250 WO HCPCS

## 2023-02-22 PROCEDURE — 36415 COLL VENOUS BLD VENIPUNCTURE: CPT

## 2023-02-22 PROCEDURE — 99232 SBSQ HOSP IP/OBS MODERATE 35: CPT | Performed by: INTERNAL MEDICINE

## 2023-02-22 PROCEDURE — 2580000003 HC RX 258: Performed by: INTERNAL MEDICINE

## 2023-02-22 PROCEDURE — 0QU03JZ SUPPLEMENT LUMBAR VERTEBRA WITH SYNTHETIC SUBSTITUTE, PERCUTANEOUS APPROACH: ICD-10-PCS | Performed by: ORTHOPAEDIC SURGERY

## 2023-02-22 PROCEDURE — 2720000010 HC SURG SUPPLY STERILE: Performed by: ORTHOPAEDIC SURGERY

## 2023-02-22 PROCEDURE — 3600000013 HC SURGERY LEVEL 3 ADDTL 15MIN: Performed by: ORTHOPAEDIC SURGERY

## 2023-02-22 PROCEDURE — 2500000003 HC RX 250 WO HCPCS: Performed by: ORTHOPAEDIC SURGERY

## 2023-02-22 PROCEDURE — 2000000000 HC ICU R&B

## 2023-02-22 PROCEDURE — 2580000003 HC RX 258: Performed by: ORTHOPAEDIC SURGERY

## 2023-02-22 PROCEDURE — 0PS43ZZ REPOSITION THORACIC VERTEBRA, PERCUTANEOUS APPROACH: ICD-10-PCS | Performed by: ORTHOPAEDIC SURGERY

## 2023-02-22 PROCEDURE — 6360000004 HC RX CONTRAST MEDICATION: Performed by: ORTHOPAEDIC SURGERY

## 2023-02-22 PROCEDURE — 85730 THROMBOPLASTIN TIME PARTIAL: CPT

## 2023-02-22 PROCEDURE — 80048 BASIC METABOLIC PNL TOTAL CA: CPT

## 2023-02-22 PROCEDURE — 3209999900 FLUORO FOR SURGICAL PROCEDURES

## 2023-02-22 PROCEDURE — 6360000002 HC RX W HCPCS

## 2023-02-22 PROCEDURE — 1200000000 HC SEMI PRIVATE

## 2023-02-22 PROCEDURE — 0QS03ZZ REPOSITION LUMBAR VERTEBRA, PERCUTANEOUS APPROACH: ICD-10-PCS | Performed by: ORTHOPAEDIC SURGERY

## 2023-02-22 PROCEDURE — 7100000001 HC PACU RECOVERY - ADDTL 15 MIN: Performed by: ORTHOPAEDIC SURGERY

## 2023-02-22 PROCEDURE — 3700000000 HC ANESTHESIA ATTENDED CARE: Performed by: ORTHOPAEDIC SURGERY

## 2023-02-22 DEVICE — CEMENT C01A KYPHX HV-R BONE CEMENT EN
Type: IMPLANTABLE DEVICE | Site: SPINE LUMBAR | Status: FUNCTIONAL
Brand: KYPHON® HV-R® BONE CEMENT

## 2023-02-22 RX ORDER — LIDOCAINE HYDROCHLORIDE 10 MG/ML
INJECTION, SOLUTION EPIDURAL; INFILTRATION; INTRACAUDAL; PERINEURAL PRN
Status: DISCONTINUED | OUTPATIENT
Start: 2023-02-22 | End: 2023-02-22 | Stop reason: SDUPTHER

## 2023-02-22 RX ORDER — ROCURONIUM BROMIDE 10 MG/ML
INJECTION, SOLUTION INTRAVENOUS PRN
Status: DISCONTINUED | OUTPATIENT
Start: 2023-02-22 | End: 2023-02-22 | Stop reason: SDUPTHER

## 2023-02-22 RX ORDER — ONDANSETRON 2 MG/ML
INJECTION INTRAMUSCULAR; INTRAVENOUS PRN
Status: DISCONTINUED | OUTPATIENT
Start: 2023-02-22 | End: 2023-02-22 | Stop reason: SDUPTHER

## 2023-02-22 RX ORDER — BUPIVACAINE HYDROCHLORIDE AND EPINEPHRINE 5; 5 MG/ML; UG/ML
INJECTION, SOLUTION EPIDURAL; INTRACAUDAL; PERINEURAL PRN
Status: DISCONTINUED | OUTPATIENT
Start: 2023-02-22 | End: 2023-02-22 | Stop reason: ALTCHOICE

## 2023-02-22 RX ORDER — FENTANYL CITRATE 50 UG/ML
INJECTION, SOLUTION INTRAMUSCULAR; INTRAVENOUS PRN
Status: DISCONTINUED | OUTPATIENT
Start: 2023-02-22 | End: 2023-02-22 | Stop reason: SDUPTHER

## 2023-02-22 RX ORDER — ONDANSETRON 2 MG/ML
4 INJECTION INTRAMUSCULAR; INTRAVENOUS
Status: DISCONTINUED | OUTPATIENT
Start: 2023-02-22 | End: 2023-02-22 | Stop reason: HOSPADM

## 2023-02-22 RX ORDER — SODIUM CHLORIDE, SODIUM LACTATE, POTASSIUM CHLORIDE, CALCIUM CHLORIDE 600; 310; 30; 20 MG/100ML; MG/100ML; MG/100ML; MG/100ML
INJECTION, SOLUTION INTRAVENOUS CONTINUOUS PRN
Status: DISCONTINUED | OUTPATIENT
Start: 2023-02-22 | End: 2023-02-22 | Stop reason: SDUPTHER

## 2023-02-22 RX ORDER — SODIUM CHLORIDE 0.9 % (FLUSH) 0.9 %
5-40 SYRINGE (ML) INJECTION EVERY 12 HOURS SCHEDULED
Status: DISCONTINUED | OUTPATIENT
Start: 2023-02-22 | End: 2023-02-22 | Stop reason: HOSPADM

## 2023-02-22 RX ORDER — MORPHINE SULFATE 2 MG/ML
2 INJECTION, SOLUTION INTRAMUSCULAR; INTRAVENOUS EVERY 5 MIN PRN
Status: DISCONTINUED | OUTPATIENT
Start: 2023-02-22 | End: 2023-02-22 | Stop reason: HOSPADM

## 2023-02-22 RX ORDER — MAGNESIUM HYDROXIDE 1200 MG/15ML
LIQUID ORAL CONTINUOUS PRN
Status: COMPLETED | OUTPATIENT
Start: 2023-02-22 | End: 2023-02-22

## 2023-02-22 RX ORDER — FENTANYL CITRATE 50 UG/ML
25 INJECTION, SOLUTION INTRAMUSCULAR; INTRAVENOUS EVERY 5 MIN PRN
Status: DISCONTINUED | OUTPATIENT
Start: 2023-02-22 | End: 2023-02-22 | Stop reason: HOSPADM

## 2023-02-22 RX ORDER — DIPHENHYDRAMINE HYDROCHLORIDE 50 MG/ML
12.5 INJECTION INTRAMUSCULAR; INTRAVENOUS
Status: DISCONTINUED | OUTPATIENT
Start: 2023-02-22 | End: 2023-02-22 | Stop reason: HOSPADM

## 2023-02-22 RX ORDER — PHENYLEPHRINE HCL IN 0.9% NACL 0.5 MG/5ML
SYRINGE (ML) INTRAVENOUS PRN
Status: DISCONTINUED | OUTPATIENT
Start: 2023-02-22 | End: 2023-02-22 | Stop reason: SDUPTHER

## 2023-02-22 RX ORDER — SODIUM CHLORIDE 9 MG/ML
INJECTION, SOLUTION INTRAVENOUS PRN
Status: DISCONTINUED | OUTPATIENT
Start: 2023-02-22 | End: 2023-02-22 | Stop reason: HOSPADM

## 2023-02-22 RX ORDER — DEXAMETHASONE SODIUM PHOSPHATE 10 MG/ML
INJECTION, SOLUTION INTRAMUSCULAR; INTRAVENOUS PRN
Status: DISCONTINUED | OUTPATIENT
Start: 2023-02-22 | End: 2023-02-22 | Stop reason: SDUPTHER

## 2023-02-22 RX ORDER — SODIUM CHLORIDE 0.9 % (FLUSH) 0.9 %
5-40 SYRINGE (ML) INJECTION PRN
Status: DISCONTINUED | OUTPATIENT
Start: 2023-02-22 | End: 2023-02-22 | Stop reason: HOSPADM

## 2023-02-22 RX ORDER — PROPOFOL 10 MG/ML
INJECTION, EMULSION INTRAVENOUS PRN
Status: DISCONTINUED | OUTPATIENT
Start: 2023-02-22 | End: 2023-02-22 | Stop reason: SDUPTHER

## 2023-02-22 RX ADMIN — TRAMADOL HYDROCHLORIDE 50 MG: 50 TABLET, COATED ORAL at 08:56

## 2023-02-22 RX ADMIN — PROPOFOL 20 MG: 10 INJECTION, EMULSION INTRAVENOUS at 21:22

## 2023-02-22 RX ADMIN — ROCURONIUM BROMIDE 10 MG: 10 INJECTION, SOLUTION INTRAVENOUS at 20:34

## 2023-02-22 RX ADMIN — FENTANYL CITRATE 50 MCG: 50 INJECTION, SOLUTION INTRAMUSCULAR; INTRAVENOUS at 20:33

## 2023-02-22 RX ADMIN — CHOLECALCIFEROL TAB 10 MCG (400 UNIT) 400 UNITS: 10 TAB at 08:56

## 2023-02-22 RX ADMIN — LIDOCAINE HYDROCHLORIDE 50 MG: 10 INJECTION, SOLUTION EPIDURAL; INFILTRATION; INTRACAUDAL; PERINEURAL at 20:09

## 2023-02-22 RX ADMIN — ACETAMINOPHEN 650 MG: 325 TABLET ORAL at 16:55

## 2023-02-22 RX ADMIN — ONDANSETRON 4 MG: 2 INJECTION INTRAMUSCULAR; INTRAVENOUS at 21:12

## 2023-02-22 RX ADMIN — SUGAMMADEX 200 MG: 100 INJECTION, SOLUTION INTRAVENOUS at 21:14

## 2023-02-22 RX ADMIN — DOCUSATE SODIUM 100 MG: 100 CAPSULE ORAL at 08:56

## 2023-02-22 RX ADMIN — PROPOFOL 150 MG: 10 INJECTION, EMULSION INTRAVENOUS at 20:09

## 2023-02-22 RX ADMIN — SODIUM CHLORIDE, POTASSIUM CHLORIDE, SODIUM LACTATE AND CALCIUM CHLORIDE: 600; 310; 30; 20 INJECTION, SOLUTION INTRAVENOUS at 20:04

## 2023-02-22 RX ADMIN — DEXAMETHASONE SODIUM PHOSPHATE 10 MG: 10 INJECTION, SOLUTION INTRAMUSCULAR; INTRAVENOUS at 20:21

## 2023-02-22 RX ADMIN — AMLODIPINE BESYLATE 5 MG: 5 TABLET ORAL at 08:56

## 2023-02-22 RX ADMIN — Medication 100 MCG: at 20:44

## 2023-02-22 RX ADMIN — PANTOPRAZOLE SODIUM 40 MG: 40 TABLET, DELAYED RELEASE ORAL at 08:56

## 2023-02-22 RX ADMIN — ROCURONIUM BROMIDE 30 MG: 10 INJECTION, SOLUTION INTRAVENOUS at 20:09

## 2023-02-22 RX ADMIN — LISINOPRIL 40 MG: 20 TABLET ORAL at 08:56

## 2023-02-22 RX ADMIN — Medication 100 MCG: at 21:03

## 2023-02-22 RX ADMIN — BACLOFEN 5 MG: 5 TABLET ORAL at 16:55

## 2023-02-22 RX ADMIN — SODIUM CHLORIDE, PRESERVATIVE FREE 10 ML: 5 INJECTION INTRAVENOUS at 13:30

## 2023-02-22 RX ADMIN — Medication 1 TABLET: at 08:56

## 2023-02-22 RX ADMIN — Medication 2 G: at 20:21

## 2023-02-22 RX ADMIN — VENLAFAXINE HYDROCHLORIDE 37.5 MG: 37.5 CAPSULE, EXTENDED RELEASE ORAL at 08:58

## 2023-02-22 ASSESSMENT — ENCOUNTER SYMPTOMS
COUGH: 0
WHEEZING: 0
ABDOMINAL PAIN: 0
BACK PAIN: 1
SHORTNESS OF BREATH: 0
CONSTIPATION: 0

## 2023-02-22 ASSESSMENT — PAIN - FUNCTIONAL ASSESSMENT: PAIN_FUNCTIONAL_ASSESSMENT: 0-10

## 2023-02-22 NOTE — CONSULTS
Orthopaedic Surgery Consult  (Dr. David Cuellar)      CC/Reason for consult: Vertebral compression fractures T10, L1-L2    HPI:      The patient is a 81 y.o. right hand-dominant female who is admitted to the hospital due to severe thoracic and lumbar back pain. She was admitted on Saturday 2/18 after MRI obtained in the emergency department demonstrated T10 acute vertebral fractures, and L1/2 chronic versus acute vertebral fractures. Orthopedic surgery was consulted as a second opinion at family request.    Patient seen and examined at bedside with family present. Patient states that all of her pain is located in her thoracic and lumbar regions. She denies trauma or accident. She denies any initial event that started her pain. She does state that she was visiting her  4 to 5 days ago, who is in the hospital at this time, and sat in a very uncomfortable chair for an extended period of time. She believes this positioning ultimately led to her compression fractures. She has a previous history of kyphoplasty of T12, and L3. She has a prior medical history of hepatitis C infection and hypertension. She denies current tobacco use. She denies numbness or tingling in the bilateral lower extremities. She denies saddle anesthesia or loss of control of bowel or bladder function. Past Medical History:    Past Medical History:   Diagnosis Date    Acute hepatitis C virus infection without hepatic coma 7/5/2022    Anxiety and depression     Colitis     w/ inpatient colonscopy polyp removal    GERD (gastroesophageal reflux disease)     HTN (hypertension) 12/04/2014    Restless legs 11/17/2020     Past Surgical History:    Past Surgical History:   Procedure Laterality Date    APPENDECTOMY      HYSTERECTOMY (CERVIX STATUS UNKNOWN)      SPINAL FUSION       Medications Prior to Admission:   Prior to Admission medications    Medication Sig Start Date End Date Taking?  Authorizing Provider   amLODIPine (NORVASC) 5 MG tablet TAKE ONE TABLET BY MOUTH DAILY 1/3/23   PABLITO Mckenzie CNP   venlafaxine (EFFEXOR XR) 37.5 MG extended release capsule TAKE ONE CAPSULE BY MOUTH TWICE A DAY 10/31/22   PABLITO Ray CNP   traMADol (ULTRAM) 50 MG tablet as needed. 10/24/22   Historical Provider, MD   Misc. Devices (STEP N REST WALKER) MISC 1 each by Does not apply route continuous Seated, wheeled walker  Patient not taking: Reported on 2023 10/28/22   PABLITO Mckenzie CNP   Baclofen (LIORESAL) 5 MG tablet Take 1 tablet by mouth 3 times daily as needed (muscle spasms)  Patient not taking: Reported on 2023 10/6/22   PABLITO St CNP   lisinopril (PRINIVIL;ZESTRIL) 40 MG tablet Take 1 tablet by mouth in the morning. 22   PABLITO Mckenzie CNP   mirtazapine (REMERON) 15 MG tablet Take 0.5 tablets by mouth nightly 22   PABLITO Mckenzie CNP   naproxen (NAPROSYN) 500 MG tablet Take 1 tablet by mouth 2 times daily (with meals) for 14 days 22  PABLITO Mckenzie CNP   omeprazole (PRILOSEC) 40 MG delayed release capsule Take 1 capsule by mouth Daily 22   PABLITO Mckenzie CNP   diclofenac sodium (VOLTAREN) 1 % GEL Apply 2-4 g topically 4 times daily as needed for Pain  Patient not taking: Reported on 2023   PABLITO Mckenzie CNP   Multiple Vitamins-Minerals (MULTIVITAMIN ADULT) CHEW Take 1 tablet by mouth daily 22   PABLITO Mckenzie CNP     Allergies:    Patient has no known allergies. Social History:   Social History     Socioeconomic History    Marital status:      Spouse name: None    Number of children: None    Years of education: None    Highest education level: None   Tobacco Use    Smoking status: Former     Packs/day: 0.25     Years: 30.00     Pack years: 7.50     Types: Cigarettes     Quit date: 1980     Years since quittin.1    Smokeless tobacco: Never   Substance and Sexual Activity    Alcohol use:  Yes Comment: socially     Drug use: No     Social Determinants of Health     Financial Resource Strain: Low Risk     Difficulty of Paying Living Expenses: Not hard at all   Food Insecurity: No Food Insecurity    Worried About Running Out of Food in the Last Year: Never true    Ran Out of Food in the Last Year: Never true     Family History:  Family History   Problem Relation Age of Onset    Heart Disease Father        ROS:   Constitutional: Negative for fever and chills. Respiratory: Negative for cough. Cardiovascular: Negative for chest pain. Musculoskeletal: Positive for myalgias about the paraspinal region. Skin: Negative for itching and rash. Neurological: Negative for numbness, tingling, weakness. PE:  Blood pressure 114/61, pulse 82, temperature 98.4 °F (36.9 °C), temperature source Oral, resp. rate 13, weight 120 lb (54.4 kg), SpO2 96 %, not currently breastfeeding. Gen: Alert and oriented, cooperative however in pain. Head: Normocephalic, atraumatic. Cardiovascular: Regular rate. Respiratory: Chest symmetric, no accessory muscle use. Spine/bilateral lower extremity: Numerous nevi present to the posterior back. No evidence of lacerations, rashes, erythema or overt infection. Extremely tender to to light palpation about the lower thoracic and lumbar region. Significant pain with movement. Left lower extremity motor exam:  L3: 5/5 to manual resistance  L4: 5/5 to manual resistance  L5: 5/5 to manual resistance  S1: 5/5 to manual resistance  S2: 5/5 to manual resistance    Right lower extremity motor exam:  L3: 5/5 to manual resistance  L4: 5/5 to manual resistance  L5: 5/5 to manual resistance  S1: 5/5 to manual resistance  S2: 5/5 to manual resistance    Sensation intact to light touch L3-S1 in bilateral lower extremities. The feet are warm and well-perfused with BCR.     Labs:  Recent Labs     02/19/23  0105 02/20/23  0414   WBC 5.7  --    HGB 9.8*  --    HCT 28.9*  --    PLT 154  --     138   K 3.3* 3.9   BUN 4* 6*   CREATININE 0.46* 0.55   GLUCOSE 101* 96        Imaging:     MRI THORACIC SPINE WO CONTRAST    Result Date: 2/18/2023  EXAMINATION: MRI OF THE THORACIC SPINE WITHOUT CONTRAST  2/18/2023 4:11 pm TECHNIQUE: Multiplanar multisequence MRI of the thoracic spine was performed without the administration of intravenous contrast. COMPARISON: CT thoracic spine from today. HISTORY: ORDERING SYSTEM PROVIDED HISTORY: fall TECHNOLOGIST PROVIDED HISTORY: fall Decision Support Exception - unselect if not a suspected or confirmed emergency medical condition->Emergency Medical Condition (MA) Reason for Exam: fall FINDINGS: BONES/ALIGNMENT: Compression fractures T10, T12, L1 and L2. there also mild compression fractures at L2, and L5. Linear fluid signal is noted within the T10 vertebral body. No significant retropulsion. Signal loss T12 vertebral body consistent with methylmethacrylate. SPINAL CORD: No abnormal cord signal is seen. SOFT TISSUES: No paraspinal mass identified. DEGENERATIVE CHANGES: No significant spinal canal stenosis or neural foraminal narrowing of the thoracic spine. Multiple compression fractures as above. Fluid signal within the T10 vertebral body suggests it as being relatively acute is compared to the others. MRI LUMBAR SPINE WO CONTRAST    Result Date: 2/18/2023  EXAMINATION: MRI OF THE LUMBAR SPINE WITHOUT CONTRAST, 2/18/2023 4:11 pm TECHNIQUE: Multiplanar multisequence MRI of the lumbar spine was performed without the administration of intravenous contrast. COMPARISON: CT lumbar spine 4 hours ago. HISTORY: ORDERING SYSTEM PROVIDED HISTORY: fall TECHNOLOGIST PROVIDED HISTORY: fall Decision Support Exception - unselect if not a suspected or confirmed emergency medical condition->Emergency Medical Condition (MA) Reason for Exam: fall FINDINGS: BONES/ALIGNMENT: Multiple compression fractures T10, T12, L1, L2, L3, L4.  Fluid signal noted within the T10 vertebral body otherwise no acute edema or significant retropulsion. SPINAL CORD: The conus terminates normally. SOFT TISSUES: No paraspinal mass identified. L1-L2: Moderate central posterior disc marginal osteophyte. Central canal and neural foramina patent. L2-L3: There is no significant disc herniation, spinal canal stenosis or neural foraminal narrowing. L3-L4: Moderate trefoil type narrowing of the thecal sac and severe narrowing of the neural foramina bilaterally due to hypertrophic facet disease. L4-L5: There is no significant disc herniation, spinal canal stenosis. Hypertrophic facet disease and moderate bilateral neural foraminal narrowing. L5-S1: There is no significant disc herniation, spinal canal stenosis. Moderate bilateral neural foraminal narrowing. Multiple compression fractures as above. Moderate disc marginal osteophyte and spinal stenosis at L1-2. Multilevel bilateral neural foraminal narrowing. Assessment/Plan: 81 y.o. female being seen for:    -Vertebral compression fracture T10  -Vertebral compression fractures L1, L2      -Plan for OR 2/22/2023 with Dr. Kailyn Correa, for T11, L1, and L2 kyphoplasty pending clearance from ancillary teams  -WB status: Weightbearing as tolerated  -Please make n.p.o. at midnight  -Ancef on-call the OR  -Patient marked and consented for surgery. -DVT ppx: Please hold all chemical AC for OR tomorrow  -consent in chart  -F/u VitD level  -Pain control per primary team management  -Ice and heat for pain and swelling  -Please contact ortho with any questions      Miguelito Brown MD  Resident Physician, PGY-1   Orthopaedic Surgery  10:41 PM 2/21/2023    PGY-2 Addendum    Patient seen and examined. Agree with above assessment by Dr. Esperanza Gee. Patient is an 78-year-old female who was admitted to the hospital for severe thoracic and lumbar back pain. MRI obtained demonstrates compression fractures of the T10, L1, and L2. She is neurovascular intact.   She has 5 out of 5 motor strength from L3-S1 bilaterally. Sensation is intact to light touch from L3-S1 bilaterally. Plan is for the patient to go to the OR tomorrow 2/22/2023 with Dr. Joanna Jain for kyphoplasty of T10/L1/L2. Patient has been marked and is consented for surgery. Please make n.p.o. at midnight. Ancef 2 g on-call to the OR. Please hold chemical AC in anticipation for surgery. Patient is weightbearing as tolerated to bilateral lower extremities. Please message orthopedic on call with any questions.       Ana Carrasco,   Orthopedic Surgery Resident, PGY-2  [facility]

## 2023-02-22 NOTE — PROGRESS NOTES
[facility]  Office: [address], DO, Celinamontez Moreno, DO, Davida Nash, DO, Wei Malloynickie Blood, DO, Alek Smith MD, Momo Eckert MD, Nessa Rockwell MD, Shira Bates MD,  Alvaro Monique MD, Evelina Cummings MD, Janette Leiva, DO, July Alegre MD,  Jamie Turner MD, Yvonne Cronin MD, Brenda Hathaway DO, Kinga Flores MD, Mar Bhardwaj MD, Berry Villa DO, Drake Robbins MD, Emir Robertson MD, Jeana Macias MD, Thuy Barrera MD, Shane Reynoso DO, Kyler Gomes MD, Marciano Naylor MD, Camden Lawler, [facility],  Shayan Miles, CNP, Priya Doyle, CNP, Perley Gilford, CNP,  Yany Carlos, [facility], Dari Ji, CNP, Dayna Madsen, CNP, Barb Reardon, CNP, Misti Griggs, CNP, Lawyer Keller, CNP, Melinda Echeverria PA-C, Sergo Rodriguez, CNS, Cletis Hashimoto, CNP, Sofía Marques, CNP         [address]    Progress Note    2/22/2023    7:56 AM    Name:   Robert Cifuentes  MRN:     [id number]     Acct:      [de-identified]   Room:   [address] [place] [address] Day:  4  Admit Date:  2/18/2023 11:26 AM    PCP:   PABLITO Reddy CNP  Code Status:  Full Code    Subjective:     C/C:   Chief Complaint   Patient presents with    Back Pain     Interval History Status: not changed. Patient complains of back pain . She has been seen by Orthopedics and plan is for kyphoplasty today. She has been requiring 2 l of oxygen and is saturating 97% currently. She has no fever, cough or wheezing. She has h/o smoking 1/2 ppd but quit 30 years ago. She c/o pain with deep breathing. Labs and other vitals reviewed. CXR on admission shows hyperinflation and some peripheral nodules but no acute findings. Will order another CXR now. Lovenox put on hold for surgery. Patient's grand daughter at bedside. Encouraged patient to do IS and will order EPC cuffs.           Brief History:     Patient was admitted thru ER with:  Brian Villagran is a 81 y.o. female who presents with lower back pain for the past 2 weeks. Patient's grandchildren and son are in the room and provide additional history as patient is hard of hearing. Patient and patient's children report that she had a spinal fusion with Dr. Poncho Morrison at [facility] last October. Patient became involved with therapy and noticed an improvement in her back pain at that time. However for the past 2 weeks her  has been hospitalized and she had been sitting in a hard nonsupportive chair for several hours a day after which she experienced worsening upper back pain that has not been improved with physical therapy. Patient has been taking Motrin with minimal improvement in addition to tramadol which did not seem to help at all. Patient has a history of osteoporotic fracture without trauma in the past.  Patient denies any incontinence but her family reports that she has been avoiding using any laxatives for her normal constipation as it is painful for her to have to go to the restroom. Patient denies any numbness in the saddle region or in lower extremities. She reports that she feels like she is weak in her lower extremities because of the pain when she is walking. Her family reports that she is having difficulty transferring and getting around due to the pain. Patient normally uses a cane to ambulate. \"        Review of Systems:     Review of Systems   Constitutional:  Negative for chills and fever. Respiratory:  Negative for cough, shortness of breath and wheezing. Cardiovascular:  Negative for chest pain, palpitations and leg swelling. Gastrointestinal:  Negative for abdominal pain and constipation. Genitourinary:  Negative for decreased urine volume. Musculoskeletal:  Positive for back pain. Negative for arthralgias. Neurological:  Negative for dizziness, weakness, numbness and headaches. Psychiatric/Behavioral:  Positive for sleep disturbance.  The patient is nervous/anxious. Medications: Allergies:  No Known Allergies    Current Meds:   Scheduled Meds:    ceFAZolin  2,000 mg IntraVENous On Call to OR    docusate sodium  100 mg Oral Daily    polyethylene glycol  17 g Oral Daily    vitamin D3  400 Units Oral Daily    calcium carbonate  1,000 mg Oral Daily    amLODIPine  5 mg Oral Daily    mirtazapine  7.5 mg Oral Nightly    therapeutic multivitamin-minerals  1 tablet Oral Daily    lisinopril  40 mg Oral Daily    pantoprazole  40 mg Oral QAM AC    venlafaxine  37.5 mg Oral BID    sodium chloride flush  5-40 mL IntraVENous 2 times per day    enoxaparin  40 mg SubCUTAneous Daily     Continuous Infusions:    sodium chloride       PRN Meds: traMADol, Baclofen, sodium chloride flush, sodium chloride, potassium chloride **OR** potassium alternative oral replacement **OR** potassium chloride, ondansetron **OR** ondansetron, bisacodyl, acetaminophen **OR** acetaminophen, LORazepam    Data:     Past Medical History:   has a past medical history of Acute hepatitis C virus infection without hepatic coma, Anxiety and depression, Colitis, GERD (gastroesophageal reflux disease), HTN (hypertension), and Restless legs. Social History:   reports that she quit smoking about 43 years ago. Her smoking use included cigarettes. She has a 7.50 pack-year smoking history. She has never used smokeless tobacco. She reports current alcohol use. She reports that she does not use drugs. Family History:   Family History   Problem Relation Age of Onset    Heart Disease Father        Vitals:  /77   Pulse 74   Temp 97.8 °F (36.6 °C) (Oral)   Resp 15   Wt 120 lb (54.4 kg)   SpO2 98%   BMI 24.24 kg/m²   Temp (24hrs), Av.2 °F (36.8 °C), Min:97.8 °F (36.6 °C), Max:98.6 °F (37 °C)    No results for input(s): POCGLU in the last 72 hours. I/O (24Hr):     Intake/Output Summary (Last 24 hours) at 2023 0094  Last data filed at 2023 0684  Gross per 24 hour   Intake -- Output 700 ml   Net -700 ml         Labs:  Hematology:  No results for input(s): WBC, RBC, HGB, HCT, MCV, MCH, MCHC, RDW, PLT, MPV, SEDRATE, CRP, INR, DDIMER, AD4QFWRA, LABABSO in the last 72 hours. Invalid input(s): PT    Chemistry:  Recent Labs     02/19/23  0929 02/19/23  1624 02/20/23  0414   NA  --   --  138   K  --   --  3.9   CL  --   --  104   CO2  --   --  27   GLUCOSE  --   --  96   BUN  --   --  6*   CREATININE  --   --  0.55   ANIONGAP  --   --  7*   LABGLOM  --   --  >60   CALCIUM  --   --  8.6   TROPHS 21* 22*  --        No results for input(s): PROT, LABALBU, LABA1C, X8UEPXD, P6YPSYO, FT4, TSH, AST, ALT, LDH, GGT, ALKPHOS, LABGGT, BILITOT, BILIDIR, AMMONIA, AMYLASE, LIPASE, LACTATE, CHOL, HDL, LDLCHOLESTEROL, CHOLHDLRATIO, TRIG, VLDL, VJI52FM, PHENYTOIN, PHENYF, URICACID, POCGLU in the last 72 hours. ABG:No results found for: POCPH, PHART, PH, POCPCO2, DBF4YBS, PCO2, POCPO2, PO2ART, PO2, POCHCO3, XEI6LRZ, HCO3, NBEA, PBEA, BEART, BE, THGBART, THB, OJV6EXA, AYAA6XCL, N7CITORV, O2SAT, FIO2  Lab Results   Component Value Date/Time    SPECIAL NOT REPORTED 02/08/2021 09:45 AM     Lab Results   Component Value Date/Time    CULTURE NO SIGNIFICANT GROWTH 07/07/2022 02:30 PM       Radiology:  CT THORACIC SPINE WO CONTRAST    Result Date: 2/18/2023  1. Global decreased bone mineral density. 2. Normal thoracic spine alignment with new/acute T10 insufficiency fracture with approximately 5% loss of height. 3. Normal lumbar spine alignment with stable diffuse degenerative changes. 4. Compared to the prior 2022, imaging there is progressive height loss at L1, with approximately 60% loss of height, L2, with approximately 10% loss of height, and L3, with approximately 15% loss of height. All of these are concerning for new/acute insufficiency fractures. 5. Moderate-severe L3/L4, canal stenosis related to degenerative changes with superimposed insufficiency fracture posterior cortical displacement.      CT LUMBAR SPINE WO CONTRAST    Result Date: 2/18/2023  1. Global decreased bone mineral density. 2. Normal thoracic spine alignment with new/acute T10 insufficiency fracture with approximately 5% loss of height. 3. Normal lumbar spine alignment with stable diffuse degenerative changes. 4. Compared to the prior 2022, imaging there is progressive height loss at L1, with approximately 60% loss of height, L2, with approximately 10% loss of height, and L3, with approximately 15% loss of height. All of these are concerning for new/acute insufficiency fractures. 5. Moderate-severe L3/L4, canal stenosis related to degenerative changes with superimposed insufficiency fracture posterior cortical displacement. MRI THORACIC SPINE WO CONTRAST    Result Date: 2/18/2023  Multiple compression fractures as above. Fluid signal within the T10 vertebral body suggests it as being relatively acute is compared to the others. MRI LUMBAR SPINE WO CONTRAST    Result Date: 2/18/2023  Multiple compression fractures as above. Moderate disc marginal osteophyte and spinal stenosis at L1-2. Multilevel bilateral neural foraminal narrowing. XR LUMBAR SPINE 1 VW    Result Date: 2/19/2023  Thoracic spine: 1. Known compression deformity of T10, L1, L2, and L4. Vertebral plana at L1. Prior vertebroplasty at L3 and T12. 2. Mild degenerative changes within the remainder of the visualized thoracic spine. Upper thoracic vertebral bodies are obscured. 3. Kyphosis centered at T12. Lumbar spine: 1. Evidence of prior vertebroplasty at L3 and T12. Remote compression deformities of L4 and L2. Vertebral plana at L1. Known T10 compression deformity. 2. Underlying diffuse osteopenia. XR CHEST PORTABLE    Result Date: 2/18/2023  1. COPD. No evidence of acute congestive heart failure. 2. Indeterminate bilateral peripheral pulmonary opacities which could represent nodules and possible cavitary lesion versus subsegmental consolidation.   When clinically able, CT thorax without contrast can be performed to further evaluate the findings. XR THORACIC SPINE 1 VW    Result Date: 2/19/2023  Thoracic spine: 1. Known compression deformity of T10, L1, L2, and L4. Vertebral plana at L1. Prior vertebroplasty at L3 and T12. 2. Mild degenerative changes within the remainder of the visualized thoracic spine. Upper thoracic vertebral bodies are obscured. 3. Kyphosis centered at T12. Lumbar spine: 1. Evidence of prior vertebroplasty at L3 and T12. Remote compression deformities of L4 and L2. Vertebral plana at L1. Known T10 compression deformity. 2. Underlying diffuse osteopenia. Physical Examination:        Physical Exam  Vitals and nursing note reviewed. Constitutional:       Appearance: Normal appearance. HENT:      Head: Normocephalic and atraumatic. Eyes:      Extraocular Movements: Extraocular movements intact. Conjunctiva/sclera: Conjunctivae normal.      Pupils: Pupils are equal, round, and reactive to light. Cardiovascular:      Rate and Rhythm: Normal rate and regular rhythm. Pulmonary:      Effort: Pulmonary effort is normal. No respiratory distress. Breath sounds: Normal breath sounds. No wheezing or rales. Neurological:      General: No focal deficit present. Mental Status: She is alert and oriented to person, place, and time. Cranial Nerves: No cranial nerve deficit. Sensory: No sensory deficit. Motor: No weakness.        Assessment:        Hospital Problems             Last Modified POA    * (Principal) Closed T10 spinal fracture ([address] 2/19/2023 Yes    Age-related osteoporosis with current pathological fracture 2/18/2023 Yes    Compression fx, lumbar spine, closed, initial encounter ([address] 2/18/2023 Yes    Spinal stenosis of lumbar region without neurogenic claudication 2/18/2023 Yes    Hypokalemia 2/18/2023 Yes    Troponin level elevated 2/18/2023 Yes    HTN (hypertension) 2/18/2023 Yes    Anemia, normocytic normochromic 2/18/2023 Yes      Plan:        Acute T 10 fracture- Multiple fractures noted on MRI in thoracic and lumbar spine. Kyphoplasty planned today. IS. Repeat CXR. Encourage sitting up and deep breathing. Hold Lovenox. Brace from home obtained. PT. Continue Tramadol and Baclofen. Await NS recs. HTN- controlled on Norvasc, Lisinopril. Anxiety disorder- on Venlafaxine and Remeron for sleep. Osteoporosis- continue calcium/Vit d. Needs Dexa scan completed OP  Normocytic anemia- Follow up with GI op  Chronic hepatitis C- Normal LFT's. Liver US done last year. Follow up with GI    DVT prophylaxis- On Lovenox held today for OR. EPC cuffs    CXR reviewed. Trace blunting of edges. No significant atelectasis. Radiology reading as possible infiltrate. Patient is afebrile, no cough or expectoration. WBC count nl 2 days ago. I will hold off on antibiotics and monitor clinically. She will continue IS.      Miryam Magallanes MD  2/22/2023  7:56 AM

## 2023-02-22 NOTE — CARE COORDINATION
CM spoke with patient's granddaughter Kentrell Leblanc at bedside to discuss transitional planning. Kentrell Leblanc states that family would like IPR for rehab prior to patient returning home. Noemi requests referral be sent to [facility]. Referral sent. Await acceptance. 1600- Call received from Sham[facility]  with [facility] stating that they are able to accept patient. CM to initiate precert after post op PT/OT evaluations.

## 2023-02-22 NOTE — PLAN OF CARE
Problem: Respiratory - Adult  Goal: Achieves optimal ventilation and oxygenation  Flowsheets (Taken 2/22/2023 0504)  Achieves optimal ventilation and oxygenation:   Assess for changes in respiratory status   Position to facilitate oxygenation and minimize respiratory effort   Respiratory therapy support as indicated

## 2023-02-22 NOTE — ANESTHESIA PRE PROCEDURE
Department of Anesthesiology  Preprocedure Note       Name:  Meghan Azevedo   Age:  81 y.o.  :  1940                                          MRN:  [id number]         Date:  2023      Surgeon: Saba Ernandez):  Chip Ac DO    Procedure:     Department of Anesthesiology  Pre-Anesthesia Evaluation/Consultation         Name:  Meghan Azevedo                                         Age:  81 y.o.   MRN:  [id number]             Medications  Current Facility-Administered Medications   Medication Dose Route Frequency Provider Last Rate Last Admin    [MAR Hold] traMADol (ULTRAM) tablet 50 mg  50 mg Oral Q6H PRN Masood Reyes MD   50 mg at 23    [MAR Hold] docusate sodium (COLACE) capsule 100 mg  100 mg Oral Daily Starlette Deis, DO   100 mg at 23    [MAR Hold] polyethylene glycol (GLYCOLAX) packet 17 g  17 g Oral Daily Starlette Deis, DO   17 g at 23 0949    [MAR Hold] vitamin D3 (CHOLECALCIFEROL) tablet 400 Units  400 Units Oral Daily Starlette Deis, DO   400 Units at 23    [MAR Hold] calcium carbonate (TUMS) chewable tablet 1,000 mg  1,000 mg Oral Daily Starlette Deis, DO   1,000 mg at 23    [MAR Hold] amLODIPine (NORVASC) tablet 5 mg  5 mg Oral Daily Kip Jac Orlop, DO   5 mg at 23    [MAR Hold] Baclofen (LIORESAL) tablet 5 mg  5 mg Oral TID PRN Kip Jac Orlop, DO   5 mg at 23    [MAR Hold] mirtazapine (REMERON) tablet 7.5 mg  7.5 mg Oral Nightly Kip Jac Orlop, DO   7.5 mg at 23    [MAR Hold] therapeutic multivitamin-minerals 1 tablet  1 tablet Oral Daily Kip Jac Orlop, DO   1 tablet at 23    [MAR Hold] lisinopril (PRINIVIL;ZESTRIL) tablet 40 mg  40 mg Oral Daily Kip Jac Orlop, DO   40 mg at 23    [MAR Hold] pantoprazole (PROTONIX) tablet 40 mg  40 mg Oral QAM AC Christopher J Orlop, DO   40 mg at 23 0856    [MAR Hold] venlafaxine (EFFEXOR XR) extended release capsule 37.5 mg  37.5 mg Oral BID [facility] Orlop, DO   37.5 mg at 02/22/23 0858    [MAR Hold] sodium chloride flush 0.9 % injection 5-40 mL  5-40 mL IntraVENous 2 times per day [facility] Orlop, DO   10 mL at 02/22/23 1330    [MAR Hold] sodium chloride flush 0.9 % injection 5-40 mL  5-40 mL IntraVENous PRN [facility] Orlop, DO        [facility] Hold] 0.9 % sodium chloride infusion   IntraVENous PRN [facility] Orp, DO        [facility] Hold] potassium chloride (KLOR-CON M) extended release tablet 40 mEq  40 mEq Oral PRN [facility], DO        Or    [facility] Hold] potassium bicarb-citric acid (EFFER-K) effervescent tablet 40 mEq  40 mEq Oral PRN [facility], DO        Or    [facility] Hold] potassium chloride 10 mEq/100 mL IVPB (Peripheral Line)  10 mEq IntraVENous PRN Rufino Duffy, DO        [Held by provider] enoxaparin (LOVENOX) injection 40 mg  40 mg SubCUTAneous Daily [facility] Orp, DO   40 mg at 02/21/23 0949    [MAR Hold] ondansetron (ZOFRAN-ODT) disintegrating tablet 4 mg  4 mg Oral Q8H PRN [facility] Orlop, DO        Or    [MAR Hold] ondansetron (ZOFRAN) injection 4 mg  4 mg IntraVENous Q6H PRN [facility] Orlop, DO        [MAR Hold] bisacodyl (DULCOLAX) suppository 10 mg  10 mg Rectal Daily PRN [facility] Orlop, DO        [MAR Hold] acetaminophen (TYLENOL) tablet 650 mg  650 mg Oral Q6H PRN [facility] Orlop, DO   650 mg at 02/21/23 1813    Or    [MAR Hold] acetaminophen (TYLENOL) suppository 650 mg  650 mg Rectal Q6H PRN [facility] Orlop, DO        [MAR Hold] LORazepam (ATIVAN) tablet 0.5 mg  0.5 mg Oral Q6H PRN Kilai Chou DO   0.5 mg at 02/18/23 1539       No Known Allergies  Patient Active Problem List   Diagnosis    Anxiety and depression    Gastroesophageal reflux disease    HTN (hypertension)    Anemia, normocytic normochromic    Restless legs    Tardive dyskinesia    Elevated LFTs    Hepatitis C antibody positive in blood    Acute hepatitis C virus infection without hepatic coma    Compression fracture of L4 vertebra (HCC)    Inguinal hernia    Compression fracture of T12 vertebra (HCC)    Age-related osteoporosis with current pathological fracture    Compression fx, lumbar spine, closed, initial encounter [address] [address]    Closed T10 spinal fracture ([facility] [address]    Spinal stenosis of lumbar region without neurogenic claudication    Hypokalemia    Troponin level elevated     Past Medical History:   Diagnosis Date    Acute hepatitis C virus infection without hepatic coma 2022    Anxiety and depression     Colitis     w/ inpatient colonscopy polyp removal    GERD (gastroesophageal reflux disease)     HTN (hypertension) 2014    Restless legs 2020     Past Surgical History:   Procedure Laterality Date    APPENDECTOMY      HYSTERECTOMY (CERVIX STATUS UNKNOWN)      SPINAL FUSION       Social History     Tobacco Use    Smoking status: Former     Packs/day: 0.25     Years: 30.00     Pack years: 7.50     Types: Cigarettes     Quit date: 1980     Years since quittin.1    Smokeless tobacco: Never   Substance Use Topics    Alcohol use: Yes     Comment: socially     Drug use: No         Vital Signs (Current)   Vitals:    23 1422   BP: (!) 149/87   Pulse: 95   Resp: 16   Temp: 97.9 °F (36.6 °C)   SpO2: 95%     Vital Signs Statistics (for past 48 hrs)     Temp  Av.2 °F (36.8 °C)  Min: 97.8 °F (36.6 °C)   Min taken time: 23 0355  Max: 99 °F (37.2 °C)   Max taken time: 23 2030  Pulse  Av.3  Min: 76   Min taken time: 23 0355  Max: 98   Max taken time: 23 0801  Resp  Av.8  Min: 10   Min taken time: 23 0000  Max: 29   Max taken time: 23 1202  BP  Min: 108/57   Min taken time: 23 1600  Max: 152/80   Max taken time: 23 0801  MAP (mmHg)  Av.8  Min: 68   Min taken time: 23 1600  Max: 102   Max taken time: 23 0801  SpO2  Av %  Min: 94 %   Min taken time: 23 1641  Max: 100 %   Max taken time: 23 0739  BP Readings from Last 3 Encounters:   02/22/23 (!) 149/87   10/28/22 120/82   09/30/22 132/80       BMI  Body mass index is 24.24 kg/m². CBC   Lab Results   Component Value Date/Time    WBC 5.7 02/19/2023 01:05 AM    RBC 2.92 02/19/2023 01:05 AM    HGB 9.8 02/19/2023 01:05 AM    HCT 28.9 02/19/2023 01:05 AM    MCV 99.0 02/19/2023 01:05 AM    RDW 12.7 02/19/2023 01:05 AM     02/19/2023 01:05 AM       CMP    Lab Results   Component Value Date/Time     02/22/2023 08:59 AM    K 4.1 02/22/2023 08:59 AM     02/22/2023 08:59 AM    CO2 30 02/22/2023 08:59 AM    BUN 7 02/22/2023 08:59 AM    CREATININE 0.59 02/22/2023 08:59 AM    GFRAA >60 07/01/2022 01:13 PM    LABGLOM >60 02/22/2023 08:59 AM    GLUCOSE 93 02/22/2023 08:59 AM    PROT 6.1 02/18/2023 12:45 PM    PROT 6.9 05/26/2022 12:00 AM    CALCIUM 8.6 02/22/2023 08:59 AM    BILITOT 0.7 02/18/2023 12:45 PM    ALKPHOS 151 02/18/2023 12:45 PM    AST 30 02/18/2023 12:45 PM    ALT 15 02/18/2023 12:45 PM       BMP    Lab Results   Component Value Date/Time     02/22/2023 08:59 AM    K 4.1 02/22/2023 08:59 AM     02/22/2023 08:59 AM    CO2 30 02/22/2023 08:59 AM    BUN 7 02/22/2023 08:59 AM    CREATININE 0.59 02/22/2023 08:59 AM    CALCIUM 8.6 02/22/2023 08:59 AM    GFRAA >60 07/01/2022 01:13 PM    LABGLOM >60 02/22/2023 08:59 AM    GLUCOSE 93 02/22/2023 08:59 AM       POC Testing  No results for input(s): POCGLU, POCNA, POCK, POCCL, POCBUN, POCHEMO, POCHCT in the last 72 hours.     [facility]    Lab Results   Component Value Date/Time    PROTIME 11.7 02/22/2023 08:59 AM    INR 1.1 02/22/2023 08:59 AM    APTT 26.3 02/22/2023 08:59 AM       HCG (If Applicable) No results found for: PREGTESTUR, PREGSERUM, HCG, HCGQUANT     ABGs No results found for: PHART, PO2ART, SJT6ENH, XAY9EKL, BEART, S2ZGRJCX     Type & Screen (If Applicable)  No results found for: [facility]    Radiology (If Applicable)    Cardiac Testing (If Applicable)     EKG (If Applicable) nl          Medications prior to admission:   Prior to Admission medications    Medication Sig Start Date End Date Taking? Authorizing Provider   amLODIPine (NORVASC) 5 MG tablet TAKE ONE TABLET BY MOUTH DAILY 1/3/23   PABLITO Armenta CNP   venlafaxine (EFFEXOR XR) 37.5 MG extended release capsule TAKE ONE CAPSULE BY MOUTH TWICE A DAY 10/31/22   PABLITO Perera CNP   traMADol (ULTRAM) 50 MG tablet as needed. 10/24/22   Historical Provider, MD   Misc. Devices (STEP N REST WALKER) MISC 1 each by Does not apply route continuous Seated, wheeled walker  Patient not taking: Reported on 2/18/2023 10/28/22   PABLITO Armenta CNP   Baclofen (LIORESAL) 5 MG tablet Take 1 tablet by mouth 3 times daily as needed (muscle spasms)  Patient not taking: Reported on 2/18/2023 10/6/22   PABLITO Saenz CNP   lisinopril (PRINIVIL;ZESTRIL) 40 MG tablet Take 1 tablet by mouth in the morning.  7/19/22   PABLITO Armenta CNP   mirtazapine (REMERON) 15 MG tablet Take 0.5 tablets by mouth nightly 7/8/22   PABLITO Armenta CNP   naproxen (NAPROSYN) 500 MG tablet Take 1 tablet by mouth 2 times daily (with meals) for 14 days 7/8/22 7/22/22  PABLITO Armenta CNP   omeprazole (PRILOSEC) 40 MG delayed release capsule Take 1 capsule by mouth Daily 7/5/22   PABLITO Armenta CNP   diclofenac sodium (VOLTAREN) 1 % GEL Apply 2-4 g topically 4 times daily as needed for Pain  Patient not taking: Reported on 2/18/2023 5/13/22   PABLITO Armenta CNP   Multiple Vitamins-Minerals (MULTIVITAMIN ADULT) CHEW Take 1 tablet by mouth daily 5/13/22   PABLITO Armenta CNP       Current medications:    Current Facility-Administered Medications   Medication Dose Route Frequency Provider Last Rate Last Admin    [MAR Hold] traMADol (ULTRAM) tablet 50 mg  50 mg Oral Q6H PRN Mario Lao MD   50 mg at 02/22/23 0856    [MAR Hold] docusate sodium (COLACE) capsule 100 mg  100 mg Oral Daily Rashaun Padilla Doyle Konstantin, DO   100 mg at 02/22/23 0856    [MAR Hold] polyethylene glycol (GLYCOLAX) packet 17 g  17 g Oral Daily Rometta Buzzard, DO   17 g at 02/21/23 0949    [MAR Hold] vitamin D3 (CHOLECALCIFEROL) tablet 400 Units  400 Units Oral Daily Rometta Buzzard, DO   400 Units at 02/22/23 0856    [MAR Hold] calcium carbonate (TUMS) chewable tablet 1,000 mg  1,000 mg Oral Daily Rometta Buzzard, DO   1,000 mg at 02/21/23 2139    [MAR Hold] amLODIPine (NORVASC) tablet 5 mg  5 mg Oral Daily Elda Rupal Orlop, DO   5 mg at 02/22/23 0856    [MAR Hold] Baclofen (LIORESAL) tablet 5 mg  5 mg Oral TID PRN Elda Rupal Orlop, DO   5 mg at 02/21/23 2139    [MAR Hold] mirtazapine (REMERON) tablet 7.5 mg  7.5 mg Oral Nightly Elda Rupal Orlop, DO   7.5 mg at 02/21/23 2139    [MAR Hold] therapeutic multivitamin-minerals 1 tablet  1 tablet Oral Daily Elda Rupal Orlop, DO   1 tablet at 02/22/23 0856    [MAR Hold] lisinopril (PRINIVIL;ZESTRIL) tablet 40 mg  40 mg Oral Daily Elfida Rupal Orlop, DO   40 mg at 02/22/23 0856    [MAR Hold] pantoprazole (PROTONIX) tablet 40 mg  40 mg Oral [facility] Christopher SAUNDERS Orlop, DO   40 mg at 02/22/23 0856    [MAR Hold] venlafaxine (EFFEXOR XR) extended release capsule 37.5 mg  37.5 mg Oral BID Elda Rupal Orlop, DO   37.5 mg at 02/22/23 0858    [MAR Hold] sodium chloride flush 0.9 % injection 5-40 mL  5-40 mL IntraVENous 2 times per day Elfida Rupal Orlop, DO   10 mL at 02/22/23 1330    [MAR Hold] sodium chloride flush 0.9 % injection 5-40 mL  5-40 mL IntraVENous PRN Elfida Rupal Orlop, DO        [facility] Hold] 0.9 % sodium chloride infusion   IntraVENous PRN [facility] Hold] potassium chloride (KLOR-CON M) extended release tablet 40 mEq  40 mEq Oral PRN [facility] Hold] potassium bicarb-citric acid (EFFER-K) effervescent tablet 40 mEq  40 mEq Oral PRN [facility]        Or    [facility] Hold] potassium chloride 10 mEq/100 mL IVPB (Peripheral Line)  10 mEq IntraVENous PRN Nidhi Cozier, DO        [Held by provider] enoxaparin (LOVENOX) injection 40 mg  40 mg SubCUTAneous Daily Reino Fearing Orlop, DO   40 mg at 02/21/23 0949    [MAR Hold] ondansetron (ZOFRAN-ODT) disintegrating tablet 4 mg  4 mg Oral Q8H PRN Reino Fearing Orlop, DO        Or    [MAR Hold] ondansetron (ZOFRAN) injection 4 mg  4 mg IntraVENous Q6H PRN Reino Fearing Orlop, DO        [MAR Hold] bisacodyl (DULCOLAX) suppository 10 mg  10 mg Rectal Daily PRN Reino Fearing Orlop, DO        [MAR Hold] acetaminophen (TYLENOL) tablet 650 mg  650 mg Oral Q6H PRN Reino Fearing Orlop, DO   650 mg at 02/21/23 1813    Or    [MAR Hold] acetaminophen (TYLENOL) suppository 650 mg  650 mg Rectal Q6H PRN Reino Fearing Orlop, DO        [MAR Hold] LORazepam (ATIVAN) tablet 0.5 mg  0.5 mg Oral Q6H PRN Reino Fearing Orlop, DO   0.5 mg at 02/18/23 1539       Allergies:  No Known Allergies    Problem List:    Patient Active Problem List   Diagnosis Code    Anxiety and depression F41.9, F32. A    Gastroesophageal reflux disease K21.9    HTN (hypertension) I10    Anemia, normocytic normochromic D64.9    Restless legs G25.81    Tardive dyskinesia G24.01    Elevated LFTs R79.89    Hepatitis C antibody positive in blood R76.8    Acute hepatitis C virus infection without hepatic coma B17.10    Compression fracture of L4 vertebra (HCC) S32.040A    Inguinal hernia K40.90    Compression fracture of T12 vertebra (HCC) S22.080A    Age-related osteoporosis with current pathological fracture M80.00XA    Compression fx, lumbar spine, closed, initial encounter ([facility] [address] S32.000A    Closed T10 spinal fracture ([facility] [address] S22.079A    Spinal stenosis of lumbar region without neurogenic claudication M48.061    Hypokalemia E87.6    Troponin level elevated R77.8       Past Medical History:        Diagnosis Date    Acute hepatitis C virus infection without hepatic coma 7/5/2022    Anxiety and depression     Colitis     w/ inpatient colonscopy polyp removal  GERD (gastroesophageal reflux disease)     HTN (hypertension) 2014    Restless legs 2020       Past Surgical History:        Procedure Laterality Date    APPENDECTOMY      HYSTERECTOMY (CERVIX STATUS UNKNOWN)      SPINAL FUSION         Social History:    Social History     Tobacco Use    Smoking status: Former     Packs/day: 0.25     Years: 30.00     Pack years: 7.50     Types: Cigarettes     Quit date: 1980     Years since quittin.1    Smokeless tobacco: Never   Substance Use Topics    Alcohol use: Yes     Comment: socially                                 Counseling given: Not Answered      Vital Signs (Current):   Vitals:    23 0355 23 0739 23 1131 23 1422   BP: 126/77 132/62 126/68 (!) 149/87   Pulse: 74 82 89 95   Resp: 15 15 22 16   Temp: 97.8 °F (36.6 °C) 98.4 °F (36.9 °C) 97.9 °F (36.6 °C) 97.9 °F (36.6 °C)   TempSrc: Oral Oral Oral Temporal   SpO2: 98% 100% 95% 95%   Weight:                                                  BP Readings from Last 3 Encounters:   23 (!) 149/87   10/28/22 120/82   22 132/80       NPO Status: Time of last liquid consumption:                         Time of last solid consumption:                         Date of last liquid consumption: 23                        Date of last solid food consumption: 23    BMI:   Wt Readings from Last 3 Encounters:   23 120 lb (54.4 kg)   23 120 lb (54.4 kg)   10/28/22 120 lb (54.4 kg)     Body mass index is 24.24 kg/m².     CBC:   Lab Results   Component Value Date/Time    WBC 5.7 2023 01:05 AM    RBC 2.92 2023 01:05 AM    HGB 9.8 2023 01:05 AM    HCT 28.9 2023 01:05 AM    MCV 99.0 2023 01:05 AM    RDW 12.7 2023 01:05 AM     2023 01:05 AM       CMP:   Lab Results   Component Value Date/Time     2023 08:59 AM    K 4.1 2023 08:59 AM     2023 08:59 AM    CO2 30 2023 08:59 AM    BUN 7 02/22/2023 08:59 AM    CREATININE 0.59 02/22/2023 08:59 AM    GFRAA >60 07/01/2022 01:13 PM    LABGLOM >60 02/22/2023 08:59 AM    GLUCOSE 93 02/22/2023 08:59 AM    PROT 6.1 02/18/2023 12:45 PM    PROT 6.9 05/26/2022 12:00 AM    CALCIUM 8.6 02/22/2023 08:59 AM    BILITOT 0.7 02/18/2023 12:45 PM    ALKPHOS 151 02/18/2023 12:45 PM    AST 30 02/18/2023 12:45 PM    ALT 15 02/18/2023 12:45 PM       POC Tests: No results for input(s): POCGLU, POCNA, POCK, POCCL, POCBUN, POCHEMO, POCHCT in the last 72 hours. Coags:   Lab Results   Component Value Date/Time    PROTIME 11.7 02/22/2023 08:59 AM    INR 1.1 02/22/2023 08:59 AM    APTT 26.3 02/22/2023 08:59 AM       HCG (If Applicable): No results found for: PREGTESTUR, PREGSERUM, HCG, HCGQUANT     ABGs: No results found for: PHART, PO2ART, DKX1BHY, TKS4APC, BEART, J2UHZEIQ     Type & Screen (If Applicable):  No results found for: LABABO, LABRH    Drug/Infectious Status (If Applicable):  No results found for: HIV, HEPCAB    COVID-19 Screening (If Applicable): No results found for: COVID19        Anesthesia Evaluation   no history of anesthetic complications:   Airway: Mallampati: III     Neck ROM: full     Dental:          Pulmonary:       (-) COPD and recent URI                           Cardiovascular:  Exercise tolerance: poor (<4 METS),   (+) hypertension:,                ROS comment: Inc trop     Neuro/Psych:   (+) neuromuscular disease:, psychiatric history: stable with treatment   (-) seizures            ROS comment: Tardive dyskinesia GI/Hepatic/Renal:   (+) GERD:, hepatitis: C, liver disease:,           Endo/Other:    (+) blood dyscrasia: anemia:., .    (-) diabetes mellitus               Abdominal:             Vascular: Other Findings:           Anesthesia Plan      general     ASA 4       Induction: intravenous.                             Elyssa Brunner MD   2/22/2023

## 2023-02-22 NOTE — PROGRESS NOTES
Orthopedic Progress Note    Patient:  Anastasiya Ruelas  YOB: 1940     81 y.o. female    Subjective:  Patient seen and examined  No complaints or concerns  No issue overnight  Pain controlled  Denies fever, HA, CP, SOB, N/V    Vitals reviewed, afebrile    Objective:   Vitals:    02/22/23 0355   BP: 126/77   Pulse: 74   Resp: 15   Temp: 97.8 °F (36.6 °C)   SpO2: 98%     Gen: NAD, cooperative     Cardiovascular: regular rate, no dependent edema    Respiratory: No acute respiratory distress    Back: Skin is intact. Focal tenderness to palpation throughout the lumbar spine. BLE. Skin is intact. No focal TTP. 5/5 strength in L3-S2. Sensation diffusely intact to light touch to L3-S1. Babinski negative.      Recent Labs     02/20/23  0414      K 3.9   BUN 6*   CREATININE 0.55   GLUCOSE 96      See rec for complete list    Impression 81 y.o. female being seen for the following    -Vertebral compression fracture T10  -Vertebral compression fractures L1, L2    Plan    - Plan for OR 2/22 for kyphoplasty of the VCFs   - WB status: Activity as tolerated  - Maintain NPO   - Please hold chemical AC in anticipation of surgery  - Page ortho with any questions/concerns    Electronically signed by Kaleb Mathew DO 6:01 AM 2/22/2023

## 2023-02-22 NOTE — PROGRESS NOTES
Physical Therapy        Physical Therapy Cancel Note      DATE: 2023    NAME: Staci Crawley  MRN: [id number]   : 1940      Patient not seen this date for Physical Therapy due to:    Surgery/Procedure: KYPHOPLASTY T-10, L1, L2 (PRONE, DYLON SPINE,   Plan to check back tomorrow 2023.       Electronically signed by Maddie Cabrera PTA on 2023 at 3:05 PM

## 2023-02-23 ENCOUNTER — APPOINTMENT (OUTPATIENT)
Dept: GENERAL RADIOLOGY | Age: 83
End: 2023-02-23
Payer: MEDICARE

## 2023-02-23 LAB
ABSOLUTE EOS #: 0.06 K/UL (ref 0–0.44)
ABSOLUTE IMMATURE GRANULOCYTE: <0.03 K/UL (ref 0–0.3)
ABSOLUTE LYMPH #: 1.35 K/UL (ref 1.1–3.7)
ABSOLUTE MONO #: 0.81 K/UL (ref 0.1–1.2)
BASOPHILS # BLD: 0 % (ref 0–2)
BASOPHILS ABSOLUTE: <0.03 K/UL (ref 0–0.2)
EOSINOPHILS RELATIVE PERCENT: 1 % (ref 1–4)
HCT VFR BLD AUTO: 29.6 % (ref 36.3–47.1)
HGB BLD-MCNC: 9.9 G/DL (ref 11.9–15.1)
IMMATURE GRANULOCYTES: 0 %
LYMPHOCYTES # BLD: 15 % (ref 24–43)
MCH RBC QN AUTO: 33.8 PG (ref 25.2–33.5)
MCHC RBC AUTO-ENTMCNC: 33.4 G/DL (ref 28.4–34.8)
MCV RBC AUTO: 101 FL (ref 82.6–102.9)
MONOCYTES # BLD: 9 % (ref 3–12)
NRBC AUTOMATED: 0 PER 100 WBC
PDW BLD-RTO: 12.7 % (ref 11.8–14.4)
PLATELET # BLD AUTO: 167 K/UL (ref 138–453)
PMV BLD AUTO: 9.6 FL (ref 8.1–13.5)
PROCALCITONIN SERPL-MCNC: 0.16 NG/ML
RBC # BLD: 2.93 M/UL (ref 3.95–5.11)
SEG NEUTROPHILS: 75 % (ref 36–65)
SEGMENTED NEUTROPHILS ABSOLUTE COUNT: 6.62 K/UL (ref 1.5–8.1)
WBC # BLD AUTO: 8.9 K/UL (ref 3.5–11.3)

## 2023-02-23 PROCEDURE — 97535 SELF CARE MNGMENT TRAINING: CPT

## 2023-02-23 PROCEDURE — 1200000000 HC SEMI PRIVATE

## 2023-02-23 PROCEDURE — 99232 SBSQ HOSP IP/OBS MODERATE 35: CPT | Performed by: INTERNAL MEDICINE

## 2023-02-23 PROCEDURE — 6360000002 HC RX W HCPCS

## 2023-02-23 PROCEDURE — 6370000000 HC RX 637 (ALT 250 FOR IP)

## 2023-02-23 PROCEDURE — 2580000003 HC RX 258: Performed by: INTERNAL MEDICINE

## 2023-02-23 PROCEDURE — 84145 PROCALCITONIN (PCT): CPT

## 2023-02-23 PROCEDURE — 97116 GAIT TRAINING THERAPY: CPT

## 2023-02-23 PROCEDURE — 94761 N-INVAS EAR/PLS OXIMETRY MLT: CPT

## 2023-02-23 PROCEDURE — 97110 THERAPEUTIC EXERCISES: CPT

## 2023-02-23 PROCEDURE — 85025 COMPLETE CBC W/AUTO DIFF WBC: CPT

## 2023-02-23 PROCEDURE — 71045 X-RAY EXAM CHEST 1 VIEW: CPT

## 2023-02-23 PROCEDURE — 36415 COLL VENOUS BLD VENIPUNCTURE: CPT

## 2023-02-23 PROCEDURE — 97530 THERAPEUTIC ACTIVITIES: CPT

## 2023-02-23 PROCEDURE — 2580000003 HC RX 258

## 2023-02-23 PROCEDURE — 6360000002 HC RX W HCPCS: Performed by: INTERNAL MEDICINE

## 2023-02-23 RX ORDER — SODIUM CHLORIDE 9 MG/ML
INJECTION, SOLUTION INTRAVENOUS CONTINUOUS
Status: DISCONTINUED | OUTPATIENT
Start: 2023-02-23 | End: 2023-02-24

## 2023-02-23 RX ADMIN — CEFTRIAXONE SODIUM 1000 MG: 10 INJECTION, POWDER, FOR SOLUTION INTRAVENOUS at 20:39

## 2023-02-23 RX ADMIN — DOCUSATE SODIUM 100 MG: 100 CAPSULE ORAL at 11:02

## 2023-02-23 RX ADMIN — CHOLECALCIFEROL TAB 10 MCG (400 UNIT) 400 UNITS: 10 TAB at 11:03

## 2023-02-23 RX ADMIN — VENLAFAXINE HYDROCHLORIDE 37.5 MG: 37.5 CAPSULE, EXTENDED RELEASE ORAL at 20:40

## 2023-02-23 RX ADMIN — SODIUM CHLORIDE, PRESERVATIVE FREE 10 ML: 5 INJECTION INTRAVENOUS at 20:43

## 2023-02-23 RX ADMIN — ENOXAPARIN SODIUM 40 MG: 100 INJECTION SUBCUTANEOUS at 11:03

## 2023-02-23 RX ADMIN — SODIUM CHLORIDE, PRESERVATIVE FREE 10 ML: 5 INJECTION INTRAVENOUS at 11:03

## 2023-02-23 RX ADMIN — ANTACID TABLETS 1000 MG: 500 TABLET, CHEWABLE ORAL at 20:40

## 2023-02-23 RX ADMIN — Medication 1 TABLET: at 11:03

## 2023-02-23 RX ADMIN — LISINOPRIL 40 MG: 20 TABLET ORAL at 11:03

## 2023-02-23 RX ADMIN — VENLAFAXINE HYDROCHLORIDE 37.5 MG: 37.5 CAPSULE, EXTENDED RELEASE ORAL at 11:03

## 2023-02-23 RX ADMIN — MIRTAZAPINE 7.5 MG: 15 TABLET, FILM COATED ORAL at 20:40

## 2023-02-23 RX ADMIN — POLYETHYLENE GLYCOL 3350 17 G: 17 POWDER, FOR SOLUTION ORAL at 11:03

## 2023-02-23 RX ADMIN — AMLODIPINE BESYLATE 5 MG: 5 TABLET ORAL at 11:02

## 2023-02-23 RX ADMIN — SODIUM CHLORIDE: 9 INJECTION, SOLUTION INTRAVENOUS at 20:47

## 2023-02-23 RX ADMIN — PANTOPRAZOLE SODIUM 40 MG: 40 TABLET, DELAYED RELEASE ORAL at 08:30

## 2023-02-23 RX ADMIN — BACLOFEN 5 MG: 5 TABLET ORAL at 20:40

## 2023-02-23 RX ADMIN — BACLOFEN 5 MG: 5 TABLET ORAL at 11:03

## 2023-02-23 ASSESSMENT — ENCOUNTER SYMPTOMS
SHORTNESS OF BREATH: 0
BACK PAIN: 1
ABDOMINAL PAIN: 0
WHEEZING: 0
CONSTIPATION: 0
COUGH: 0

## 2023-02-23 NOTE — PLAN OF CARE
Problem: Discharge Planning  Goal: Discharge to home or other facility with appropriate resources  Outcome: Progressing     Problem: Pain  Goal: Verbalizes/displays adequate comfort level or baseline comfort level  Outcome: Progressing     Problem: Skin/Tissue Integrity  Goal: Absence of new skin breakdown  Description: 1. Monitor for areas of redness and/or skin breakdown  2. Assess vascular access sites hourly  3. Every 4-6 hours minimum:  Change oxygen saturation probe site  4. Every 4-6 hours:  If on nasal continuous positive airway pressure, respiratory therapy assess nares and determine need for appliance change or resting period.   Outcome: Progressing     Problem: Safety - Adult  Goal: Free from fall injury  Outcome: Progressing  Flowsheets (Taken 2/22/2023 2231)  Free From Fall Injury: Instruct family/caregiver on patient safety     Problem: ABCDS Injury Assessment  Goal: Absence of physical injury  Outcome: Progressing     Problem: Respiratory - Adult  Goal: Achieves optimal ventilation and oxygenation  Outcome: Progressing

## 2023-02-23 NOTE — PLAN OF CARE
Orthopedic post-op plan:    Sindy Giron is a 81 y.o. female who is s/p Vertebral kyphoplasty of T10, L1-2, POD0:    WBAT BLE, AAT  Leave dressings on and allow to fall off with showers  Diet OK from ortho perspective  Ok to restart chemical AC on POD1  Ice for pain and swelling  PT/OT eval and treat  Recommend multimodal pain regimen  OK for discharge from orthopedic perspective once evaluated by PT  F/u Dr. Halina Subramanian in 10-14 days in clinic    Electronically signed by Stu Parker DO on 2/22/2023 at 9:18 PM.

## 2023-02-23 NOTE — PROGRESS NOTES
Occupational Therapy  Facility/Department: [address] NEURO  Occupational Therapy Daily Treatment Note    Name: [place] Velez  : 1940  MRN: [id number]  Date of Service: 2023    Discharge Recommendations:  Patient would benefit from continued therapy after discharge    Patient Diagnosis(es): The primary encounter diagnosis was Closed fracture of tenth thoracic vertebra, unspecified fracture morphology, initial encounter ([facility] Diagnoses of Other closed fracture of third lumbar vertebra, initial encounter [facility]), Spinal stenosis of lumbar region, unspecified whether neurogenic claudication present, Hypokalemia, and Elevated troponin were also pertinent to this visit. Past Medical History:  has a past medical history of Acute hepatitis C virus infection without hepatic coma, Anxiety and depression, Colitis, GERD (gastroesophageal reflux disease), HTN (hypertension), and Restless legs. Past Surgical History:  has a past surgical history that includes Hysterectomy; Appendectomy; Spinal fusion; and Kyphosis surgery (2023). Assessment   Performance deficits / Impairments: Decreased functional mobility ; Decreased ADL status; Decreased strength;Decreased safe awareness;Decreased balance;Decreased high-level IADLs  Assessment: Pt participated in OT session focused on ADL tasks this session. Pt demonstrated good progress towards goals this session and is pleasant, cooperative and eager to get well.   Prognosis: Good  Activity Tolerance  Activity Tolerance: Patient Tolerated treatment well        Plan   Occupational Therapy Plan  Times Per Week: 4-5 x/wk  Current Treatment Recommendations: Strengthening, Balance training, Functional mobility training, Endurance training, Pain management, Safety education & training, Patient/Caregiver education & training, Equipment evaluation, education, & procurement, Self-Care / ADL, Home management training     Restrictions  Restrictions/Precautions  Restrictions/Precautions: Fall Risk, Up as Tolerated, General Precautions  Required Braces or Orthoses?: Yes   Required Braces or Orthoses  Spinal Other: LSO for comfort. Pt wearing upon STERLING arrival and requested to leave off at end of session while sitting in recliner  Position Activity Restriction  Other position/activity restrictions: T10 fx, CTLS rec: None. LSO for comfort per RN and family. RN to perfect serve and get updated orders for LSO. S/p kyphoplasty T10, L1, L2    Subjective   General  Patient assessed for rehabilitation services?: Yes  Response to previous treatment: Patient with no complaints from previous session  Family / Caregiver Present: No  General Comment  Comments: RN ok'd pt for OT session. Pt seated in recliner and agreeable to session. Pt reports 5/10 right side of back pain and able to continue with session. Pt positioned for comfort at end of session       Objective   Safety Devices  Type of Devices: Call light within reach;Gait belt;Patient at risk for falls;Nurse notified; Left in chair; All fall risk precautions in place  Restraints  Restraints Initially in Place: No  Balance  Sitting: Without support (SBA>Supervision upright in recliner ~25 minutes)  Standing: With support (One UE support standing 5 minutes for ADL tasks CGA>SBA)  Transfer Training  Overall Level of Assistance: Contact-guard assistance  Interventions: Verbal cues (body mechanics, hand placement and safety)  Sit to Stand: Contact-guard assistance  Stand to Sit: Contact-guard assistance  Gait  Overall Level of Assistance: Contact-guard assistance  Interventions: Verbal cues        ADL  Grooming: Modified independent   Grooming Skilled Clinical Factors: Pt completed oral care, wash face, neck and hands, brush hair while seated upright in recliner  UE Bathing: Setup;Stand by assistance;Supervision  LE Bathing: Contact guard assistance;Setup  LE Bathing Skilled Clinical Factors: Pt completed standing for lore hygiene  UE Dressing: Stand by assistance  UE D[facility]illed Clinical Factors[facility] Pt was able to doff LSO independently and requested to leave LSO off at end of session while seated in recliner. LSO is for comfort only  LE Dressing: Stand by assistance  LE Dressing Skilled Clinical Factors: dominick slipper socks  Toileting: Minimal assistance  Toileting Skilled Clinical Factors: Pt required MIN A with brief change but does report that she wears her own underwear at home  Additional Comments: Pt completed tasks seated upright in recliner except for standing for all lore hygiene and brief change. Pt able to follow back precautions throughout     Activity Tolerance  Activity Tolerance: Patient limited by pain; Patient limited by fatigue  Bed mobility  Supine to Sit: Unable to assess  Sit to Supine: Unable to assess  Scooting: Supervision  Bed Mobility Comments: Pt began and concluded session seated in recliner. Pt demonstrated ability to scoot in and out of recliner with supervision        Cognition  Overall Cognitive Status: [facility]  Orientation  Overall Orientation Status: Within Functional Limits         Education Given To: Patient  Education Provided: Role of Therapy;Precautions; ADL Adaptive Strategies;Transfer Training  Education Provided Comments: Precautions, safety and body mechanics  Education Method: Demonstration;Verbal  Barriers to Learning: None  Education Outcome: Verbalized understanding;Demonstrated understanding    AM-PAC Score  AM-PAC Inpatient Daily Activity Raw Score: 21 (02/23/23 1132)  AM-PAC Inpatient ADL T-Scale Score : 44.27 (02/23/23 1132)  ADL Inpatient CMS 0-100% Score: 32.79 (02/23/23 1132)  ADL Inpatient CMS G-Code Modifier : CJ (02/23/23 1132)         Goals  Short Term Goals  Time Frame for Short Term Goals: By discharge, pt will:  Short Term Goal 1: Demo Mod I for functional transfers and functional mobility with use of LRD for engagement in ADLs/IADLs  Short Term Goal 2: Demo Mod I for UB ADLs, including donning/doffing/adjusting brace, with setup PRN  Short Term Goal 3: Demo CGA for LB ADLs and toileting tasks with setup and AE use PRN  Short Term Goal 4: Demo 6 min dynamic standing and reaching outside JA with unilateral hand release and SBA for improved balance during ADL/IADL participation  Short Term Goal 5: Demo good safety awareness throughout therapy session with 0 VCs       Therapy Time   Individual Concurrent Group Co-treatment   Time In 1025         Time Out 1113         Minutes 48         Timed Code Treatment Minutes: Buddy 26, STERLING/L

## 2023-02-23 NOTE — PROGRESS NOTES
Orthopedic Progress Note    Patient:  Carlos Justice  YOB: 1940     81 y.o. female    Subjective:  Patient seen and examined  No complaints or concerns  No issue overnight  Pain controlled, reports significant improvement in pain after surgery  Denies fever, HA, CP, SOB    Vitals reviewed, afebrile    Objective:   Vitals:    02/23/23 0400   BP: 116/64   Pulse: 74   Resp: 14   Temp: 98.5 °F (36.9 °C)   SpO2: 96%     Gen: NAD, cooperative     Cardiovascular: regular rate, no dependent edema    Respiratory: No acute respiratory distress    Back: Band-aids c/d/I. Improved TTP to back. BLE. Skin is intact. No focal TTP. 5/5 strength in L3-S2. Sensation diffusely intact to light touch to L3-S1. Babinski negative.        Recent Labs     02/22/23  0859   INR 1.1      K 4.1   BUN 7*   CREATININE 0.59   GLUCOSE 93       See rec for complete list    Impression 81 y.o. female being seen for    - Vertebral augmentation, T10, L1 and L2, POD1    Plan    - WBAT BLE, AAT  - Leave dressings on back, OK to change as needed  - Diet OK from ortho perspective  - Ok to restart chemical AC on POD1  - Ice for pain and swelling  - PT/OT eval and treat  - Recommend multimodal pain regimen  - OK for discharge from orthopedic perspective once evaluated by PT  - F/u Dr. Maria Victoria Fitzgerald in 10-14 days in clinic    Electronically signed by Joselito Carlin DO 6:10 AM 2/23/2023

## 2023-02-23 NOTE — OP NOTE
[address] [address]                                OPERATIVE REPORT    PATIENT NAME: Jesse Weiner                      :        1940  MED REC NO:   [id number]                             ROOM:       0103  ACCOUNT NO:   [de-identified]                           ADMIT DATE: 2023  PROVIDER:     Ranjan Schulz MD    DATE OF PROCEDURE:  2023    PREOPERATIVE DIAGNOSES:  1. Pathologic vertebral compression fracture, T10, L1 and L2.  2.  Senile osteoporosis. POSTOPERATIVE DIAGNOSES:  1. Pathologic vertebral compression fracture, T10, L1 and L2.  2.  Senile osteoporosis. PROCEDURE:  Vertebral augmentation, T10, L1 and L2.    SURGEON:  Ranjan Schulz MD    ASSISTANT:  Sagar Law DO    INDICATIONS:  The patient is an 82-year lady, significant pain and  tenderness, with pain despite a nonoperative treatment program due to  her symptoms. She was counseled on surgical treatment. The surgical  procedure, risks, benefits and complications were discussed with good  comprehension and informed consent obtained. NARRATIVE PROCEDURE:  The patient was brought in the operating room,  placed under appropriate general anesthesia, and transferred to the  operating table in the prone position on a Jonnathan frame. Bony  prominences, axillae and eyes were protected. Perioperative antibiotics  given prior to incision time, and VTE prophylaxis done intraoperatively  through SCD cuffs. The back was prepped and draped in the usual  fashion. Time-out performed. I identified the level, and marked it. Skin infiltrated with Marcaine. Incision carried down across the  segment. We then marked that segment for the transpedicular approach. At T10, we then went and marked L1 and L2 and then infiltrated the skin.   Once the stab incision made, the One-Step needle was passed down to the  transverse process articular facet junction. We passed through the  costovertebral junction to the base of the pedicle. Easily crossed the  midpoint, drilled our bony channel, placed our balloon tamp. Sequential  inflation prior to good bone void creation. We elevated the inferior  endplate and got a good reduction and parallel endplates at J83. At L1,  it was severely collapsed, and there was a fissure along the endplate. We cannulate in extrapedicular approach down to that segment, able to  pass down through that segment, but did not get that through all the  way. We could not elevate the endplate but did create a small bone  cleft that we could then cement. At L2, again in like fashion from the right side, did a perpendicular  approach. Again passed down to the transverse process and facet  junction. Passed through the base of the pedicle of the body. Drilled  our bony channel, placed our balloon tamp, and created a nice bone void,  but did not elevate the endplate. We mixed our cement, allowed it to  achieve a doughy consistency. At a low-pressure fill, from  anterior-posterior, we filled the void and got a good fill with good  interdigitation at T10. At L1, we did fill the cleft, and at L2, we  again filled the void. We got a nice flow and a good support. Once the  cement fully cured, we then removed each cannula, closed the incision  with Steri-Strips, and Band-Aid placed. The patient was then  transferred to the bed, awoken from anesthesia, and brought to recovery  room in satisfactory condition.         Nimo Verduzco MD    D: 02/22/2023 21:47:08       T: 02/22/2023 21:50:33     [id number]  Job#: [id number]     Doc#: [id number]    CC:  Amira Mead MD

## 2023-02-23 NOTE — BRIEF OP NOTE
Brief Postoperative Note      Patient: Dayanna Foss  YOB: 1940  MRN: [id number]    Date of Procedure: 2/22/2023    Pre-Op Diagnosis: Compression fracture of lumbar vertebra, unspecified lumbar vertebral level, [facility] [S32.000A]    Post-Op Diagnosis: Compression fractures of vertebra T10, L1-2       Procedure(s):  KYPHOPLASTY T-10, L1, L2    Surgeon(s):  Kan Beasley MD    Assistant:  Resident: Augusto Rodrigues DO    Anesthesia: General    Estimated Blood Loss (mL): 5 cc    Fluids: 700 cc crystalloids    Complications: None    Specimens:   * No specimens in log *    Implants:  Implant Name Type Inv.  Item Serial No.  Lot No. LRB No. Used Action   CEMENT BNE HI VISC RADIOPAQUE KYPHON HV-R - [id number]  CEMENT BNE HI VISC RADIOPAQUE KYPHON HV-R  [employer] [facility]- [id number] N/A 1 Implanted         Drains:   [REMOVED] External Urinary Catheter (Removed)   Site Assessment Clean,dry & intact 02/21/23 0412   Placement Replaced 02/21/23 0412   Securement Method Other (Comment) 02/20/23 1050   Catheter Care Catheter/Wick replaced 02/20/23 1050   Perineal Care Yes 02/21/23 0412   Suction 40 mmgHg continuous 02/21/23 0412   Urine Color Yellow 02/21/23 0412   Urine Appearance Clear 02/21/23 0412   Urine Odor Malodorous 02/21/23 0412   Output (mL) 600 mL 02/19/23 1055       Findings: Compression fractures of vertebra T10, L1-2    Electronically signed by Keny Galeano DO on 2/22/2023 at 9:16 PM

## 2023-02-23 NOTE — PROGRESS NOTES
Patient noted to have hypotension on vitals since this afternoon. She is off oxygen and currently saturating between 92-95% on RA. She is not febrile. She is breathing shallow and states splinting due to pain. She has increased crepitations b/l bases on exam. She otherwise looks comfortable and conversing. Will order CXR,CBC, Procalcitonin. Start Rocephin though seems more atelectasis. Will monitor closely.   Alexia Rasmussen MD

## 2023-02-23 NOTE — PROGRESS NOTES
Physical Therapy  Facility/Department: [facility]  Physical Therapy    Name: Joseph Luis  : 1940  MRN: [id number]  Date of Service: 2023    Discharge Recommendations:  Patient would benefit from continued therapy after discharge   PT Equipment Recommendations  Equipment Needed: No  Other: Has walker at home per evaluation      Patient Diagnosis(es): The primary encounter diagnosis was Closed fracture of tenth thoracic vertebra, unspecified fracture morphology, initial encounter ([facility] Diagnoses of Other closed fracture of third lumbar vertebra, initial encounter [facility]), Spinal stenosis of lumbar region, unspecified whether neurogenic claudication present, Hypokalemia, and Elevated troponin were also pertinent to this visit. Past Medical History:  has a past medical history of Acute hepatitis C virus infection without hepatic coma, Anxiety and depression, Colitis, GERD (gastroesophageal reflux disease), HTN (hypertension), and Restless legs. Past Surgical History:  has a past surgical history that includes Hysterectomy; Appendectomy; Spinal fusion; and Kyphosis surgery (2023). Assessment   Body Structures, Functions, Activity Limitations Requiring Skilled Therapeutic Intervention: Decreased functional mobility ; Decreased ROM; Decreased body mechanics; Decreased strength;Decreased balance; Increased pain;Decreased posture  Assessment: According to nurse, left knee appears more shaky than on previous ambulation attempts. There was no overt buckling requiring therapist's lift on gait belt, however, she has a higher fall risk with this instability. Pain is under control at a \"3\"/10 in right side. She moves easily in bed, against gravity, but moves very slowly in standing, becoming fatigued prior to ambulating a functional distance. Recommend further PT to regain safe mobility. Requires PT Follow-Up: Yes  Activity Tolerance  Activity Tolerance: Patient limited by pain; Patient limited by fatigue Plan   Physcial Therapy Plan  General Plan:  (5-6x/wk)  Current Treatment Recommendations: Strengthening, ROM, Balance training, Functional mobility training, Transfer training, Endurance training, Gait training, Stair training, Pain management, Home exercise program, Safety education & training, Patient/Caregiver education & training, Equipment evaluation, education, & procurement, Positioning, Therapeutic activities  Safety Devices  Type of Devices: Call light within reach, Gait belt, Patient at risk for falls, Nurse notified, Left in chair, All fall risk precautions in place  Restraints  Restraints Initially in Place: No     Restrictions  Restrictions/Precautions  Restrictions/Precautions: Fall Risk, Up as Tolerated, General Precautions  Required Braces or Orthoses?: Yes  Required Braces or Orthoses  Spinal Other: LSO present in room and  donned sitting at EOB  Position Activity Restriction  Other position/activity restrictions: T10 fx, CTLS rec: None. LSO for comfort. S/p kyphoplasty T10, L1, L2     Subjective   Pain: \"3\"/10 \"not much pain. Werner Potters Werner Potters I was scared to fall on steps. \"  General  Chart Reviewed: Yes  Patient assessed for rehabilitation services?: Yes  Family / Caregiver Present: Yes (Granddtr present at beginning of tx.)  Follows Commands: Within Functional Limits       Cognition   Orientation  Overall Orientation Status: Within Functional Limits  Cognition  Overall Cognitive Status: WFL     Objective   Heart Rate: 71  Heart Rate Source: Monitor  BP: 113/71  BP Location: Left upper arm  BP Method: Automatic  Patient Position: Lying left side  MAP (Calculated): 85  Resp: 14  SpO2: 93 % (90 on room air immediately after gait.)  O2 Device: None (Room air)      Bed mobility  Rolling to Left: Contact guard assistance  Supine to Sit: Stand by assistance  Bed Mobility Comments: Appears unaware of how to logroll by flexing opposite knee to push. Tactile cues to flex left knee then CGA to rise to sitting.  LSO put on her at EOB. Transfers  Sit to Stand: Contact guard assistance  Stand to Sit: Contact guard assistance  Comment: Able to rise from bed or chair with her own power. Not unsteady with walker. Ambulation  Surface: Level tile  Device: Rolling Walker  Assistance: Contact guard assistance  Quality of Gait: Left knee shaking/buckling. She makes initial contact with flexion in the left knee which appears to be either a leg length discrepancy or a functional leg length discrepancy due to pelvis dropping unilaterally. Gait Deviations: Slow Kalyn;Decreased step length;Decreased step height  Distance: 20'  Comments: Left thigh occasionally blocked due to shaking. No overt buckling. Patient's pain is on right LE, but left LE is much weaker. She was shown how to fully extend knee and lock it to prevent the feeling of buckling. Stairs/Curb  Stairs?: Yes  Stairs  # Steps : 4  Stairs Height: 6\"  Rails: Left ascending  Device: Hand Held Assist  Assistance: Minimal assistance  Comment: Patient able to rise with frequent min A, otherwise CGA for ascent. Min A for descent. One-on-one education with her grandson would be recommended if family pursues DC home over further rehab, in order to train him on blocking left knee on descent. Balance  Standing - Static: Fair;-  Standing - Dynamic: +;Poor  A/AROM Exercises: Ankle pumps, heel slides, SAQ x15 reps in supine    AM-PAC Score  AM-PAC Inpatient Mobility Raw Score : 18 (02/23/23 0938)  AM-PAC Inpatient T-Scale Score : 43.63 (02/23/23 0938)  Mobility Inpatient CMS 0-100% Score: 46.58 (02/23/23 1352)  Mobility Inpatient CMS G-Code Modifier : CK (02/23/23 5884)      Goals  Short Term Goals  Time Frame for Short Term Goals: 14 visits  Short Term Goal 1: independent bed mobility  Short Term Goal 2: independent transfers  Short Term Goal 3: independent gait with rwalker x 50'  Short Term Goal 4: stair ambulation x 4 steps with 1 HR with min A+1. STG 4 met.  Updated STG 4: Stair ambulation x4 steps with 1 HR with CGA of her grandson. Patient Goals   Patient Goals : get rid of back pain       Education  Patient Education  Education Given To: Patient  Education Provided: Role of Therapy;Plan of Care;Transfer Training; Fall Prevention Strategies  Education Method: Verbal  Education Outcome: Continued education needed      Therapy Time   Individual Concurrent Group Co-treatment   Time In 0951         Time Out 4499         Minutes 39         Timed Code Treatment Minutes: [address], PT

## 2023-02-23 NOTE — ANESTHESIA POSTPROCEDURE EVALUATION
Department of Anesthesiology  Postprocedure Note    Patient: Edson Araujo  MRN: [id number]  YOB: 1940  Date of evaluation: 2/23/2023      Procedure Summary     Date: 02/22/23 Room / Location: [address]    Anesthesia Start: 2004 Anesthesia Stop: 2138    Procedure: KYPHOPLASTY T-10, L1, L2 Diagnosis:       Compression fracture of lumbar vertebra, unspecified lumbar vertebral level, initial encounter ([facility]      (Compression fracture of lumbar vertebra, unspecified lumbar vertebral level, initial encounter ([facility] Derek Reyna)    Surgeons: Angel De La Cruz MD Responsible Provider: Michael Davis MD    Anesthesia Type: general ASA Status: 4          Anesthesia Type: No value filed.     Cristal Phase I: Cristal Score: 8    Cristal Phase II:        Anesthesia Post Evaluation    Patient location during evaluation: PACU  Level of consciousness: awake  Pain score: 4  Nausea & Vomiting: no vomiting  Cardiovascular status: hemodynamically stable  Respiratory status: nasal cannula

## 2023-02-23 NOTE — DISCHARGE INSTRUCTIONS
Orthopedic Spine Discharge Instructions:  -Mobilize as tolerated. Avoid excessive Bending, Lifting, and Twisting (BLT). -Maintain surgical dressing in place until they fall off on their own. If dressing comes off with activity and hygiene, may leave uncovered if no drainage. Otherwise may replace with bandaid.  -Ice (20 minutes on and off 1 hour) as needed for swelling/pain.  -Drink plenty of fluids.  -Call the office or come to Emergency Room if signs of infection appear (hot, swollen, red, draining pus, fever). -Take medications as prescribed.  -Wean off narcotics (Percocet/Norco) as soon as possible. Do not take Tylenol if still taking narcotics. -No alcoholic beverages or driving/operating machinery while on narcotics  -Follow up with Dr. Lucinda Jones in his office in 10-14 days after surgery/discharge. Call [phone] to schedule.

## 2023-02-23 NOTE — PROGRESS NOTES
[facility]  Office: [address], DO, Leojuandilma Uriarte, DO, Lisa Foreman, DO, Gracy Molina, DO, Krys Conte MD, Luz Brito MD, Poli Shaffer MD, Sangeetha Johnson MD,  Nasima Carter MD, Jayant Gutierrez MD, Rannie Dubin, DO, Dominga Alas MD,  Jaime Ho MD, Gabriele Husain MD, Jose Harris DO, Kajal Shaw MD, Areli Francis MD, Eileen Mcgee DO, Nan Yoo MD, Karen Urban MD, Mana Lemus MD, Dennis Mckinley MD, Jonathan Howell DO, Vallorie Mcardle, MD, Jet Anthony MD, Alejandra Cr, BERENICE,  Lukasz Samuel, CNP, Janett Clements, CNP, Guru Valenzuela, CNP,  Charlene Yeh, [facility], Sofia Dumont, CNP, Sammie Landau, CNP, Dannielle Gauthier, CNP, Maria Esther Jay, CNP, Ambar Hernandez, CNP, Estee Manzo PA-C, Cooper Villagomez, CNS, Dorothy Zavala, CNP, Juliette Orozco, CNP         Carlo[address] Rowell [address]    Progress Note    2023    8:51 AM    Name:   Efraín Fournier  MRN:     [id number]     Acct:      [de-identified]   Room:   [address] [place] [address] Day:  5  Admit Date:  2023 11:26 AM    PCP:   PABLITO Fisher CNP  Code Status:  Full Code    Subjective:     C/C:   Chief Complaint   Patient presents with    Back Pain     Interval History Status: not changed. Patient feels much better after Khyphoplasty. Pain much improved. She is on 1 l/oxygen via NC now. No fever. Vitals stable. She has no fever, cough or wheezing. She has h/o smoking 1/2 ppd but quit 30 years ago. She c/o pain with deep breathing. Labs and other vitals reviewed. Patient's grand daughter at bedside. Encouraged patient to do IS and sit up more through the day. Brief History:     Patient was admitted thru ER with:  Tiffany Palafox is a 81 y.o. female who presents with lower back pain for the past 2 weeks. Patient's grandchildren and son are in the room and provide additional history as patient is hard of hearing.   Patient and patient's children report that she had a spinal fusion with Dr. Jonathan Clements at [facility] last October. Patient became involved with therapy and noticed an improvement in her back pain at that time. However for the past 2 weeks her  has been hospitalized and she had been sitting in a hard nonsupportive chair for several hours a day after which she experienced worsening upper back pain that has not been improved with physical therapy. Patient has been taking Motrin with minimal improvement in addition to tramadol which did not seem to help at all. Patient has a history of osteoporotic fracture without trauma in the past.  Patient denies any incontinence but her family reports that she has been avoiding using any laxatives for her normal constipation as it is painful for her to have to go to the restroom. Patient denies any numbness in the saddle region or in lower extremities. She reports that she feels like she is weak in her lower extremities because of the pain when she is walking. Her family reports that she is having difficulty transferring and getting around due to the pain. Patient normally uses a cane to ambulate. \"        Review of Systems:     Review of Systems   Constitutional:  Negative for chills and fever. Respiratory:  Negative for cough, shortness of breath and wheezing. Cardiovascular:  Negative for chest pain, palpitations and leg swelling. Gastrointestinal:  Negative for abdominal pain and constipation. Genitourinary:  Negative for decreased urine volume. Musculoskeletal:  Positive for back pain. Negative for arthralgias. Neurological:  Negative for dizziness, weakness, numbness and headaches. Psychiatric/Behavioral:  Positive for sleep disturbance. The patient is nervous/anxious. Medications:      Allergies:  No Known Allergies    Current Meds:   Scheduled Meds:    docusate sodium  100 mg Oral Daily    polyethylene glycol  17 g Oral Daily    vitamin D3 400 Units Oral Daily    calcium carbonate  1,000 mg Oral Daily    amLODIPine  5 mg Oral Daily    mirtazapine  7.5 mg Oral Nightly    therapeutic multivitamin-minerals  1 tablet Oral Daily    lisinopril  40 mg Oral Daily    pantoprazole  40 mg Oral QAM AC    venlafaxine  37.5 mg Oral BID    sodium chloride flush  5-40 mL IntraVENous 2 times per day    enoxaparin  40 mg SubCUTAneous Daily     Continuous Infusions:    sodium chloride       PRN Meds: traMADol, Baclofen, sodium chloride flush, sodium chloride, potassium chloride **OR** potassium alternative oral replacement **OR** potassium chloride, ondansetron **OR** ondansetron, bisacodyl, acetaminophen **OR** acetaminophen, LORazepam    Data:     Past Medical History:   has a past medical history of Acute hepatitis C virus infection without hepatic coma, Anxiety and depression, Colitis, GERD (gastroesophageal reflux disease), HTN (hypertension), and Restless legs. Social History:   reports that she quit smoking about 43 years ago. Her smoking use included cigarettes. She has a 7.50 pack-year smoking history. She has never used smokeless tobacco. She reports current alcohol use. She reports that she does not use drugs. Family History:   Family History   Problem Relation Age of Onset    Heart Disease Father        Vitals:  /71   Pulse 71   Temp 98 °F (36.7 °C) (Axillary)   Resp 14   Wt 120 lb (54.4 kg)   SpO2 96%   BMI 24.24 kg/m²   Temp (24hrs), Av °F (36.7 °C), Min:97 °F (36.1 °C), Max:98.7 °F (37.1 °C)    No results for input(s): POCGLU in the last 72 hours. I/O (24Hr):     Intake/Output Summary (Last 24 hours) at 2023 0851  Last data filed at 2023 2113  Gross per 24 hour   Intake 700 ml   Output 405 ml   Net 295 ml         Labs:  Hematology:  Recent Labs     23  0859   INR 1.1       Chemistry:  Recent Labs     23  0859      K 4.1      CO2 30   GLUCOSE 93   BUN 7*   CREATININE 0.59   ANIONGAP 7*   LABGLOM >60   CALCIUM 8.6       No results for input(s): PROT, LABALBU, LABA1C, M0VAVRW, H4EFLRT, FT4, TSH, AST, ALT, LDH, GGT, ALKPHOS, LABGGT, BILITOT, BILIDIR, AMMONIA, AMYLASE, LIPASE, LACTATE, CHOL, HDL, LDLCHOLESTEROL, CHOLHDLRATIO, TRIG, VLDL, QIU85GD, PHENYTOIN, PHENYF, URICACID, POCGLU in the last 72 hours. ABG:No results found for: POCPH, PHART, PH, POCPCO2, ABB1ATW, PCO2, POCPO2, PO2ART, PO2, POCHCO3, RNW4XQU, HCO3, NBEA, PBEA, BEART, BE, THGBART, THB, WIC8LOK, FODU0ZGE, N9DYLRDP, O2SAT, FIO2  Lab Results   Component Value Date/Time    SPECIAL NOT REPORTED 02/08/2021 09:45 AM     Lab Results   Component Value Date/Time    CULTURE NO SIGNIFICANT GROWTH 07/07/2022 02:30 PM       Radiology:  CT THORACIC SPINE WO CONTRAST    Result Date: 2/18/2023  1. Global decreased bone mineral density. 2. Normal thoracic spine alignment with new/acute T10 insufficiency fracture with approximately 5% loss of height. 3. Normal lumbar spine alignment with stable diffuse degenerative changes. 4. Compared to the prior 2022, imaging there is progressive height loss at L1, with approximately 60% loss of height, L2, with approximately 10% loss of height, and L3, with approximately 15% loss of height. All of these are concerning for new/acute insufficiency fractures. 5. Moderate-severe L3/L4, canal stenosis related to degenerative changes with superimposed insufficiency fracture posterior cortical displacement. CT LUMBAR SPINE WO CONTRAST    Result Date: 2/18/2023  1. Global decreased bone mineral density. 2. Normal thoracic spine alignment with new/acute T10 insufficiency fracture with approximately 5% loss of height. 3. Normal lumbar spine alignment with stable diffuse degenerative changes. 4. Compared to the prior 2022, imaging there is progressive height loss at L1, with approximately 60% loss of height, L2, with approximately 10% loss of height, and L3, with approximately 15% loss of height.   All of these are concerning for new/acute insufficiency fractures. 5. Moderate-severe L3/L4, canal stenosis related to degenerative changes with superimposed insufficiency fracture posterior cortical displacement. MRI THORACIC SPINE WO CONTRAST    Result Date: 2/18/2023  Multiple compression fractures as above. Fluid signal within the T10 vertebral body suggests it as being relatively acute is compared to the others. MRI LUMBAR SPINE WO CONTRAST    Result Date: 2/18/2023  Multiple compression fractures as above. Moderate disc marginal osteophyte and spinal stenosis at L1-2. Multilevel bilateral neural foraminal narrowing. XR LUMBAR SPINE 1 VW    Result Date: 2/19/2023  Thoracic spine: 1. Known compression deformity of T10, L1, L2, and L4. Vertebral plana at L1. Prior vertebroplasty at L3 and T12. 2. Mild degenerative changes within the remainder of the visualized thoracic spine. Upper thoracic vertebral bodies are obscured. 3. Kyphosis centered at T12. Lumbar spine: 1. Evidence of prior vertebroplasty at L3 and T12. Remote compression deformities of L4 and L2. Vertebral plana at L1. Known T10 compression deformity. 2. Underlying diffuse osteopenia. XR CHEST PORTABLE    Result Date: 2/18/2023  1. COPD. No evidence of acute congestive heart failure. 2. Indeterminate bilateral peripheral pulmonary opacities which could represent nodules and possible cavitary lesion versus subsegmental consolidation. When clinically able, CT thorax without contrast can be performed to further evaluate the findings. XR THORACIC SPINE 1 VW    Result Date: 2/19/2023  Thoracic spine: 1. Known compression deformity of T10, L1, L2, and L4. Vertebral plana at L1. Prior vertebroplasty at L3 and T12. 2. Mild degenerative changes within the remainder of the visualized thoracic spine. Upper thoracic vertebral bodies are obscured. 3. Kyphosis centered at T12. Lumbar spine: 1. Evidence of prior vertebroplasty at L3 and T12.   Remote compression deformities of L4 and L2. Vertebral plana at L1. Known T10 compression deformity. 2. Underlying diffuse osteopenia. Physical Examination:        Physical Exam  Vitals and nursing note reviewed. Constitutional:       Appearance: Normal appearance. HENT:      Head: Normocephalic and atraumatic. Eyes:      Extraocular Movements: Extraocular movements intact. Conjunctiva/sclera: Conjunctivae normal.      Pupils: Pupils are equal, round, and reactive to light. Cardiovascular:      Rate and Rhythm: Normal rate and regular rhythm. Pulmonary:      Effort: Pulmonary effort is normal. No respiratory distress. Breath sounds: Normal breath sounds. No wheezing or rales. Neurological:      General: No focal deficit present. Mental Status: She is alert and oriented to person, place, and time. Cranial Nerves: No cranial nerve deficit. Sensory: No sensory deficit. Motor: No weakness. Assessment:        Hospital Problems             Last Modified POA    * (Principal) Closed T10 spinal fracture ([facility] [address] 2/19/2023 Yes    Age-related osteoporosis with current pathological fracture 2/18/2023 Yes    Compression fx, lumbar spine, closed, initial encounter ([address] 2/18/2023 Yes    Spinal stenosis of lumbar region without neurogenic claudication 2/18/2023 Yes    Hypokalemia 2/18/2023 Yes    Troponin level elevated 2/18/2023 Yes    HTN (hypertension) 2/18/2023 Yes    Anemia, normocytic normochromic 2/18/2023 Yes      Plan:        Acute T 10 fracture- Multiple fractures noted on MRI in thoracic and lumbar spine. Kyphoplasty done yesterday. Patient will do PT today. Encourage sitting up and deep breathing. Hold Lovenox. Brace from home obtained. PT. Continue Tramadol and Baclofen. Await NS recs. Acute respiratory failure - on 1 liter oxygen currently via NC. Advised to continue IS. Sit up more. Discussed CXR findings with Grand daughter and RN. no signs of pneumonia.  Get CXR 2 views when patient more comfortable and able to ambulate. HTN- controlled on Norvasc, Lisinopril. Anxiety disorder- on Venlafaxine and Remeron for sleep. Osteoporosis- continue calcium/Vit d. Needs Dexa scan completed OP  Normocytic anemia- Follow up with GI op  Chronic hepatitis C- Normal LFT's. Liver US done last year.  Follow up with GI    DVT prophylaxis- Restarted Lovenox per ortho notes        Drake Robbins MD  2/23/2023  8:51 AM

## 2023-02-23 NOTE — CARE COORDINATION
ADRIANA called and updated Stephanie with PT and Abad with OT that patient needs STAT therapy notes this morning for insurance precert. 1257- CM unable to initiate [facility] for IRF with [employer]. ADRIANA called and updated Yehuda Zhang with [facility] and she states that she will initate precert.

## 2023-02-23 NOTE — PLAN OF CARE
Problem: Discharge Planning  Goal: Discharge to home or other facility with appropriate resources  2/23/2023 1545 by Isac Orellana RN  Outcome: Progressing  2/23/2023 0627 by Ghanshyam Parnell  Outcome: Progressing     Problem: Pain  Goal: Verbalizes/displays adequate comfort level or baseline comfort level  2/23/2023 1545 by Isac Orellana RN  Outcome: Tessie Pratt  2/23/2023 0627 by Ghanshyam Parnell  Outcome: Progressing     Problem: Skin/Tissue Integrity  Goal: Absence of new skin breakdown  Description: 1. Monitor for areas of redness and/or skin breakdown  2. Assess vascular access sites hourly  3. Every 4-6 hours minimum:  Change oxygen saturation probe site  4. Every 4-6 hours:  If on nasal continuous positive airway pressure, respiratory therapy assess nares and determine need for appliance change or resting period.   2/23/2023 1545 by Isac Orellana RN  Outcome: Progressing  2/23/2023 0627 by Ghanshyam Parnell  Outcome: Progressing     Problem: Safety - Adult  Goal: Free from fall injury  2/23/2023 1545 by Isac Orellana RN  Outcome: Progressing  2/23/2023 0627 by Ghanshyam Parnell  Outcome: Progressing  Flowsheets (Taken 2/22/2023 2231)  Free From Fall Injury: Instruct family/caregiver on patient safety     Problem: ABCDS Injury Assessment  Goal: Absence of physical injury  2/23/2023 1545 by Isac Orellana RN  Outcome: Tessie Pratt  2/23/2023 0627 by Ghanshyam Parnell  Outcome: Progressing     Problem: Respiratory - Adult  Goal: Achieves optimal ventilation and oxygenation  2/23/2023 1545 by Isac Orellana RN  Outcome: Progressing  2/23/2023 0627 by Ghanshyam Parnell  Outcome: Progressing

## 2023-02-24 LAB
ABSOLUTE EOS #: 0.23 K/UL (ref 0–0.44)
ABSOLUTE IMMATURE GRANULOCYTE: <0.03 K/UL (ref 0–0.3)
ABSOLUTE LYMPH #: 1.94 K/UL (ref 1.1–3.7)
ABSOLUTE MONO #: 0.56 K/UL (ref 0.1–1.2)
ANION GAP SERPL CALCULATED.3IONS-SCNC: 5 MMOL/L (ref 9–17)
BASOPHILS # BLD: 0 % (ref 0–2)
BASOPHILS ABSOLUTE: 0.03 K/UL (ref 0–0.2)
BUN SERPL-MCNC: 18 MG/DL (ref 8–23)
CALCIUM SERPL-MCNC: 8.5 MG/DL (ref 8.6–10.4)
CHLORIDE SERPL-SCNC: 106 MMOL/L (ref 98–107)
CO2 SERPL-SCNC: 31 MMOL/L (ref 20–31)
CREAT SERPL-MCNC: 0.79 MG/DL (ref 0.5–0.9)
EOSINOPHILS RELATIVE PERCENT: 3 % (ref 1–4)
GFR SERPL CREATININE-BSD FRML MDRD: >60 ML/MIN/1.73M2
GLUCOSE SERPL-MCNC: 99 MG/DL (ref 70–99)
HCT VFR BLD AUTO: 29.1 % (ref 36.3–47.1)
HGB BLD-MCNC: 9.7 G/DL (ref 11.9–15.1)
IMMATURE GRANULOCYTES: 0 %
LYMPHOCYTES # BLD: 26 % (ref 24–43)
MCH RBC QN AUTO: 33.8 PG (ref 25.2–33.5)
MCHC RBC AUTO-ENTMCNC: 33.3 G/DL (ref 28.4–34.8)
MCV RBC AUTO: 101.4 FL (ref 82.6–102.9)
MONOCYTES # BLD: 8 % (ref 3–12)
NRBC AUTOMATED: 0 PER 100 WBC
PDW BLD-RTO: 12.8 % (ref 11.8–14.4)
PLATELET # BLD AUTO: 146 K/UL (ref 138–453)
PMV BLD AUTO: 9.6 FL (ref 8.1–13.5)
POTASSIUM SERPL-SCNC: 4.2 MMOL/L (ref 3.7–5.3)
RBC # BLD: 2.87 M/UL (ref 3.95–5.11)
SEG NEUTROPHILS: 63 % (ref 36–65)
SEGMENTED NEUTROPHILS ABSOLUTE COUNT: 4.68 K/UL (ref 1.5–8.1)
SODIUM SERPL-SCNC: 142 MMOL/L (ref 135–144)
WBC # BLD AUTO: 7.5 K/UL (ref 3.5–11.3)

## 2023-02-24 PROCEDURE — 36415 COLL VENOUS BLD VENIPUNCTURE: CPT

## 2023-02-24 PROCEDURE — 6370000000 HC RX 637 (ALT 250 FOR IP)

## 2023-02-24 PROCEDURE — 97110 THERAPEUTIC EXERCISES: CPT

## 2023-02-24 PROCEDURE — 6360000002 HC RX W HCPCS

## 2023-02-24 PROCEDURE — 80048 BASIC METABOLIC PNL TOTAL CA: CPT

## 2023-02-24 PROCEDURE — 99232 SBSQ HOSP IP/OBS MODERATE 35: CPT | Performed by: INTERNAL MEDICINE

## 2023-02-24 PROCEDURE — 2580000003 HC RX 258

## 2023-02-24 PROCEDURE — 85025 COMPLETE CBC W/AUTO DIFF WBC: CPT

## 2023-02-24 PROCEDURE — 1200000000 HC SEMI PRIVATE

## 2023-02-24 PROCEDURE — 97530 THERAPEUTIC ACTIVITIES: CPT

## 2023-02-24 RX ADMIN — VENLAFAXINE HYDROCHLORIDE 37.5 MG: 37.5 CAPSULE, EXTENDED RELEASE ORAL at 08:09

## 2023-02-24 RX ADMIN — SODIUM CHLORIDE, PRESERVATIVE FREE 5 ML: 5 INJECTION INTRAVENOUS at 08:10

## 2023-02-24 RX ADMIN — POLYETHYLENE GLYCOL 3350 17 G: 17 POWDER, FOR SOLUTION ORAL at 08:09

## 2023-02-24 RX ADMIN — PANTOPRAZOLE SODIUM 40 MG: 40 TABLET, DELAYED RELEASE ORAL at 08:09

## 2023-02-24 RX ADMIN — CHOLECALCIFEROL TAB 10 MCG (400 UNIT) 400 UNITS: 10 TAB at 08:09

## 2023-02-24 RX ADMIN — ACETAMINOPHEN 650 MG: 325 TABLET ORAL at 22:04

## 2023-02-24 RX ADMIN — DOCUSATE SODIUM 100 MG: 100 CAPSULE ORAL at 08:09

## 2023-02-24 RX ADMIN — ENOXAPARIN SODIUM 40 MG: 100 INJECTION SUBCUTANEOUS at 08:09

## 2023-02-24 RX ADMIN — BACLOFEN 5 MG: 5 TABLET ORAL at 16:26

## 2023-02-24 RX ADMIN — Medication 1 TABLET: at 08:09

## 2023-02-24 RX ADMIN — ANTACID TABLETS 1000 MG: 500 TABLET, CHEWABLE ORAL at 21:47

## 2023-02-24 RX ADMIN — ACETAMINOPHEN 650 MG: 325 TABLET ORAL at 06:02

## 2023-02-24 RX ADMIN — ACETAMINOPHEN 650 MG: 325 TABLET ORAL at 15:35

## 2023-02-24 RX ADMIN — VENLAFAXINE HYDROCHLORIDE 37.5 MG: 37.5 CAPSULE, EXTENDED RELEASE ORAL at 21:47

## 2023-02-24 RX ADMIN — MIRTAZAPINE 7.5 MG: 15 TABLET, FILM COATED ORAL at 21:47

## 2023-02-24 RX ADMIN — SODIUM CHLORIDE, PRESERVATIVE FREE 5 ML: 5 INJECTION INTRAVENOUS at 22:10

## 2023-02-24 RX ADMIN — TRAMADOL HYDROCHLORIDE 50 MG: 50 TABLET, COATED ORAL at 10:35

## 2023-02-24 ASSESSMENT — PAIN DESCRIPTION - ORIENTATION
ORIENTATION: MID
ORIENTATION: UPPER

## 2023-02-24 ASSESSMENT — ENCOUNTER SYMPTOMS
ABDOMINAL PAIN: 0
CONSTIPATION: 0
BACK PAIN: 1
SHORTNESS OF BREATH: 0
COUGH: 0
WHEEZING: 0

## 2023-02-24 ASSESSMENT — PAIN SCALES - GENERAL
PAINLEVEL_OUTOF10: 5
PAINLEVEL_OUTOF10: 8
PAINLEVEL_OUTOF10: 8
PAINLEVEL_OUTOF10: 5
PAINLEVEL_OUTOF10: 8
PAINLEVEL_OUTOF10: 3
PAINLEVEL_OUTOF10: 8
PAINLEVEL_OUTOF10: 5

## 2023-02-24 ASSESSMENT — PAIN DESCRIPTION - DESCRIPTORS
DESCRIPTORS: STABBING
DESCRIPTORS: ACHING
DESCRIPTORS: STABBING

## 2023-02-24 ASSESSMENT — PAIN DESCRIPTION - LOCATION
LOCATION: BACK

## 2023-02-24 NOTE — DISCHARGE INSTR - COC
Continuity of Care Form    Patient Name: Zuleyma Avila   :  1940  MRN:  [id number]    Admit date:  2023  Discharge date:  ***    Code Status Order: Full Code   Advance Directives:   Advance Care Flowsheet Documentation       Date/Time Healthcare Directive Type of Healthcare Directive Copy in [address] Agent's Name Healthcare Agent's Phone Number    23 1419 No, patient does not have an advance directive for healthcare treatment -- -- -- -- --            Admitting Physician:  Mario Lao MD  PCP: PABLITO Armenta CNP    Discharging Nurse: [facility] Unit/Room#: 0103/0103-01  Discharging Unit Phone Number: ***    Emergency Contact:   Extended Emergency Contact Information  Primary Emergency Contact: Caleb Rasmussen  Address: [place] [address]  Home Phone: [phone]  Relation: Child  Secondary Emergency Contact: andreroosevelt harmon  Home Phone: [phone]  Relation: Grandchild  Preferred language: English   needed? No    Past Surgical History:  Past Surgical History:   Procedure Laterality Date    APPENDECTOMY      HYSTERECTOMY (CERVIX STATUS UNKNOWN)      KYPHOSIS SURGERY  2023    KYPHOPLASTY T-10, L1, L2    SPINAL FUSION      SPINE SURGERY N/A 2023    KYPHOPLASTY T-10, L1, L2 performed by Preston Willis MD at [address] [place] [address] History:   Immunization History   Administered Date(s) Administered    COVID-19, PFIZER PURPLE top, DILUTE for use, (age 15 y+), 30mcg/0.3mL 2021, 2021, 2021    Influenza, FLUAD, (age 72 y+), Adjuvanted, 0.5mL 10/02/2020, 2022    Pneumococcal Conjugate 13-valent (Ovjpwjc51) 06/15/2017    Pneumococcal Polysaccharide (Pcwsxwzxh65) 05/10/2016    Tdap (Boostrix, Adacel) 2020       Active Problems:  Patient Active Problem List   Diagnosis Code    Anxiety and depression F41.9, F32. A    Gastroesophageal reflux disease K21.9    HTN (hypertension) I10    Anemia, normocytic normochromic D64.9    Restless legs G25.81    Tardive dyskinesia G24.01    Elevated LFTs R79.89    Hepatitis C antibody positive in blood R76.8    Acute hepatitis C virus infection without hepatic coma B17.10    Compression fracture of L4 vertebra (HCC) S32.040A    Inguinal hernia K40.90    Compression fracture of T12 vertebra (HCC) S22.080A    Age-related osteoporosis with current pathological fracture M80.00XA    Compression fx, lumbar spine, closed, initial encounter ([facility] [address] S32.000A    Closed T10 spinal fracture ([facility] [address] S22.079A    Spinal stenosis of lumbar region without neurogenic claudication M48.061    Hypokalemia E87.6    Troponin level elevated R77.8       Isolation/Infection:   Isolation            No Isolation          Patient Infection Status       None to display            Nurse Assessment:  Last Vital Signs: /64   Pulse 69   Temp 97.3 °F (36.3 °C) (Oral)   Resp 20   Wt 120 lb (54.4 kg)   SpO2 99%   BMI 24.24 kg/m²     Last documented pain score (0-10 scale): Pain Level: 5  Last Weight:   Wt Readings from Last 1 Encounters:   02/18/23 120 lb (54.4 kg)     Mental Status:  {IP PT MENTAL STATUS:53247}    IV Access:  { DENNIS IV ACCESS:472259776}    Nursing Mobility/ADLs:  Walking   {CHP DME FFFL:079065657}  Transfer  {CHP DME INDW:886691289}  Bathing  {CHP DME YSPB:313438450}  Dressing  {CHP DME QDWR:715924618}  Toileting  {CHP DME OFQF:105460597}  Feeding  {CHP DME KHLT:335261147}  Med Admin  {CHP DME RQXZ:839000829}  Med Delivery   {[facility] MED Delivery:432367954}    Wound Care Documentation and Therapy:  Incision 02/22/23 Back Medial (Active)   Dressing Status Clean;Dry; Intact 02/24/23 0800   Dressing/Treatment Steri-strips;Band-Aid/adhesive bandage 02/22/23 2203   Closure Adhesive bandage 02/24/23 0800   Margins Approximated 02/22/23 2203   Drainage Amount None 02/22/23 2203   Odor None 02/22/23 2203   Cecilia-incision Assessment Intact 02/22/23 2203 Number of days: 1        Elimination:  Continence: Bowel: {YES / ZH:49783}  Bladder: {YES / BM:26072}  Urinary Catheter: {Urinary Catheter:984065849}   Colostomy/Ileostomy/Ileal Conduit: {YES / ZP:87747}       Date of Last BM: ***    Intake/Output Summary (Last 24 hours) at 2023 1429  Last data filed at 2023 0917  Gross per 24 hour   Intake 855.57 ml   Output 975 ml   Net -119.43 ml     I/O last 3 completed shifts:   In: 1176.6 [I.V.:1176.6]  Out: 780 [Urine:775; Blood:5]    Safety Concerns:     [address] [employer] Safety Concerns:442277259}    Impairments/Disabilities:      [address] [employer] Impairments/Disabilities:616941110}    Nutrition Therapy:  Current Nutrition Therapy:   [address] [employer] Diet List:750098277}    Routes of Feeding: {CHP DME Other Feedings:319734301}  Liquids: {Slp liquid thickness:41114}  Daily Fluid Restriction: {CHP DME Yes amt example:404927499}  Last Modified Barium Swallow with Video (Video Swallowing Test): {Done Not Done ZRBA:609949082}    Treatments at the Time of Hospital Discharge:   Respiratory Treatments: ***  Oxygen Therapy:  {Therapy; copd oxygen:04460}  Ventilator:    {MH CC Vent KTZN:912732119}    Rehab Therapies: {THERAPEUTIC INTERVENTION:3790834065}  Weight Bearing Status/Restrictions: [address]  Weight Bearin}  Other Medical Equipment (for information only, NOT a DME order):  {EQUIPMENT:467256191}  Other Treatments: ***    Patient's personal belongings (please select all that are sent with patient):  {CHP DME Belongings:548781242}    RN SIGNATURE:  {Esignature:199340127}    CASE MANAGEMENT/SOCIAL WORK SECTION    Inpatient Status Date: ***    Readmission Risk Assessment Score:  Readmission Risk              Risk of Unplanned Readmission:  17           Discharging to Facility/ Agency   Name:   Address:  Phone:  Fax:    Dialysis Facility (if applicable)   Name:  Address:  Dialysis Schedule:  Phone:  Fax:    / signature: {Esignature:163676620}    PHYSICIAN SECTION    Prognosis: Fair    Condition at Discharge: Stable    Rehab Potential (if transferring to Rehab): Fair    Recommended Labs or Other Treatments After Discharge: CXR    Physician Certification: I certify the above information and transfer of Sindy Giron  is necessary for the continuing treatment of the diagnosis listed and that she requires Acute Rehab for less 30 days.      Update Admission H&P: No change in H&P    PHYSICIAN SIGNATURE:  Electronically signed by Salvador Pantoja MD on 2/24/23 at 2:29 PM EST

## 2023-02-24 NOTE — PROGRESS NOTES
Contacted number for peer to peer and talked to Emelia Aviles. They need lot of information including NPI before they can even schedule a meeting. Will have case management call them back with all needed information and schedule appointment for peer to peer with me. Perfect serve sent to Courtney Veras to update me .

## 2023-02-24 NOTE — PLAN OF CARE
Problem: Discharge Planning  Goal: Discharge to home or other facility with appropriate resources  2/24/2023 1828 by Austin Bermeo RN  Outcome: Progressing  Flowsheets (Taken 2/24/2023 0800)  Discharge to home or other facility with appropriate resources:   Identify barriers to discharge with patient and caregiver   Arrange for needed discharge resources and transportation as appropriate   Identify discharge learning needs (meds, wound care, etc)   Arrange for interpreters to assist at discharge as needed   Refer to discharge planning if patient needs post-hospital services based on physician order or complex needs related to functional status, cognitive ability or social support system  2/24/2023 0615 by WILLIAM Sandy  Outcome: Progressing     Problem: Pain  Goal: Verbalizes/displays adequate comfort level or baseline comfort level  2/24/2023 1828 by Austin Bermeo RN  Outcome: Progressing  Flowsheets (Taken 2/24/2023 0800)  Verbalizes/displays adequate comfort level or baseline comfort level:   Encourage patient to monitor pain and request assistance   Assess pain using appropriate pain scale   Administer analgesics based on type and severity of pain and evaluate response   Implement non-pharmacological measures as appropriate and evaluate response   Consider cultural and social influences on pain and pain management   Notify Licensed Independent Practitioner if interventions unsuccessful or patient reports new pain  2/24/2023 0615 by Jeffery Justin  Outcome: Progressing     Problem: Skin/Tissue Integrity  Goal: Absence of new skin breakdown  Description: 1. Monitor for areas of redness and/or skin breakdown  2. Assess vascular access sites hourly  3. Every 4-6 hours minimum:  Change oxygen saturation probe site  4. Every 4-6 hours:  If on nasal continuous positive airway pressure, respiratory therapy assess nares and determine need for appliance change or resting period.   2/24/2023 1828 by Austin Bermeo RN  Outcome: Progressing  2/24/2023 0615 by Tima Garza  Outcome: Progressing     Problem: Safety - Adult  Goal: Free from fall injury  2/24/2023 1828 by Jose Aguilar RN  Outcome: Progressing  2/24/2023 0615 by Tima Garza  Outcome: Progressing     Problem: ABCDS Injury Assessment  Goal: Absence of physical injury  2/24/2023 1828 by Jose Aguilar RN  Outcome: Progressing  2/24/2023 0615 by Tima Garza  Outcome: Progressing     Problem: Respiratory - Adult  Goal: Achieves optimal ventilation and oxygenation  2/24/2023 1828 by Jose Aguilar RN  Outcome: Progressing  Flowsheets (Taken 2/24/2023 0800)  Achieves optimal ventilation and oxygenation:   Assess for changes in respiratory status   Assess for changes in mentation and behavior   Position to facilitate oxygenation and minimize respiratory effort   Oxygen supplementation based on oxygen saturation or arterial blood gases   Respiratory therapy support as indicated   Encourage broncho-pulmonary hygiene including cough, deep breathe, incentive spirometry   Assess and instruct to report shortness of breath or any respiratory difficulty  2/24/2023 0615 by Tima Garza  Outcome: Progressing

## 2023-02-24 NOTE — PROGRESS NOTES
SPIRITUAL CARE DEPARTMENT - [facility] 84  PROGRESS NOTE    Shift date: 2/23/2023  Shift day: Thursday   Shift # 3    Room # 0103/0103-01   Name: Ed Finn                Nondenominational: Unknown   Place of [employer]: Unknown    Referral: Routine Visit    Admit Date & Time: 2/18/2023 11:26 AM    Assessment:  Ed Finn is an 81 y.o. female in the hospital because of a \"closed T10 spinal fracture. \" Per family, patient underwent surgery yesterday. Upon entering the room writer observes patient resting in hospital bed, with family present in recliner, at bedside. Intervention:  Writer introduced self and title as . Patient shared that she was \"feeling much better today. \" Patient and family indicated no needs at time of visit. Patient and family were open and receptive to 's visit. Per chart, patient's emergency contacts are her child, Jovanny Schumacher ([phone]) and grandchild, Edson Plascencia ([phone]). Outcome:  Patient and family thanked  for visit and care. Plan:  Chaplains will remain available to offer spiritual and emotional support as needed. 02/23/23 2202   Encounter Summary   Service Provided For: Patient and family together   Referral/Consult From: [address] [place] [address] Children;Family members   Last Encounter  02/23/23   Complexity of Encounter Low   Begin Time 2203   End Time  2219   Total Time Calculated 16 min   Encounter    Type Initial Screen/Assessment   Spiritual/Emotional needs   Type Spiritual Support   Assessment/Intervention/Outcome   Assessment Calm;Coping; Hopeful   Intervention Active listening;Discussed illness injury and its impact; Explored/Affirmed feelings, thoughts, concerns;Explored Coping Skills/Resources;Nurtured Hope;Sustaining Presence/Ministry of presence   Outcome Comfort;Coping;Receptive   Plan and Referrals   Plan/Referrals Continue to visit, (comment)  (as needed)     Electronically signed by Zay Snyder on 2/23/2023 at 10:56 PM.  [facility]  [phone]

## 2023-02-24 NOTE — CARE COORDINATION
ADRIANA spoke with Pippa Espinoza from [employer] and she states that they still have not received a response on patient's precert. [phone] [id number] Message received from Pippa Espinoza with [employer] stating insurance is requesting peer to peer. Peer to Peer to be completed by calling [phone] using reference number [id number].  PS message sent to Dr. Marianna Perez with request.

## 2023-02-24 NOTE — PROGRESS NOTES
Physical Therapy  Facility/Department: [address] [facility]  Physical Therapy Daily Treatment Note    Name: Lilia Arrieta  : 1940  MRN: [id number]  Date of Service: 2023    Discharge Recommendations:  Patient would benefit from continued therapy after discharge   PT Equipment Recommendations  Equipment Needed: No (pt reports owning a RW)      Patient Diagnosis(es): The primary encounter diagnosis was Closed fracture of tenth thoracic vertebra, unspecified fracture morphology, initial encounter ([facility] [address] Diagnoses of Other closed fracture of third lumbar vertebra, initial encounter [facility]), Spinal stenosis of lumbar region, unspecified whether neurogenic claudication present, Hypokalemia, and Elevated troponin were also pertinent to this visit. Past Medical History:  has a past medical history of Acute hepatitis C virus infection without hepatic coma, Anxiety and depression, Colitis, GERD (gastroesophageal reflux disease), HTN (hypertension), and Restless legs. Past Surgical History:  has a past surgical history that includes Hysterectomy; Appendectomy; Spinal fusion; Kyphosis surgery (2023); and Spine surgery (N/A, 2023). Assessment   Body Structures, Functions, Activity Limitations Requiring Skilled Therapeutic Intervention: Decreased functional mobility ; Decreased ROM; Decreased body mechanics; Decreased strength;Decreased balance; Increased pain;Decreased posture  Assessment: Pt demonstrates increased ambulation distance limited date, pt ambulating 80ft w/ RW CGA. Pt continues to demonstrate persistant LLE weakness. Pt is expected to require continued skilled physical therapy to address persistant weakenss, decreasing fall risk and continuing to maximize patient to prior level of independence. Therapy Prognosis: Good  Decision Making: Medium Complexity  Requires PT Follow-Up: Yes  Activity Tolerance  Activity Tolerance: Patient limited by pain; Patient limited by fatigue     Plan   Physcial Therapy Plan  General Plan:  (5-6x/wk)  Current Treatment Recommendations: Strengthening, ROM, Balance training, Functional mobility training, Transfer training, Endurance training, Gait training, Stair training, Pain management, Home exercise program, Safety education & training, Patient/Caregiver education & training, Equipment evaluation, education, & procurement, Positioning, Therapeutic activities  Safety Devices  Type of Devices: Call light within reach, Gait belt, Patient at risk for falls, Nurse notified, Left in chair, All fall risk precautions in place  Restraints  Restraints Initially in Place: No     Restrictions  Restrictions/Precautions  Restrictions/Precautions: Fall Risk, Up as Tolerated, General Precautions  Required Braces or Orthoses?: Yes (LSO for comfort)  Required Braces or Orthoses  Spinal Other: LSO for comfort. Pt wearing upon STERLING arrival and requested to leave off at end of session while sitting in recliner  Position Activity Restriction  Other position/activity restrictions: S/p kyphoplasty T10, L1, L2 (2/22/23)     Subjective   General  Patient assessed for rehabilitation services?: Yes  Response To Previous Treatment: Patient with no complaints from previous session. Family / Caregiver Present: No  Follows Commands: Within Functional Limits  Subjective  Subjective: RN and pt in agreement for PT treatment. Pt seated in bedside chair upon writer's entrance, pt pleasant and cooperative throughout session. Pt reporting 5/10 pain, RN notified and aware of current pain level. LSO donned prior to mobility.   Vision/Hearing  Vision  Vision: Impaired  Vision Exceptions: Wears glasses at all times  Hearing  Hearing: Exceptions to [facility]  Hearing Exceptions: Hard of hearing/hearing concerns (pt reports R hearing loss is worse than L)    Cognition   Orientation  Overall Orientation Status: Within Functional Limits  Cognition  Overall Cognitive Status: WFL     Objective   Gross Assessment  Sensation: Intact Bed mobility  Bed Mobility Comments: Did not assess- pt seated in bedside chair upon writer's arrival, retired in bedside chair upon writer's exit  Transfers  Sit to Stand: Contact guard assistance  Stand to Sit: Contact guard assistance  Comment: STS performed x 2 from bedside chair; verbal cueing required for proper hand placement with fair return demo  Ambulation  Surface: Level tile  Device: Rolling Walker  Assistance: Contact guard assistance  Gait Deviations: Slow Kalyn;Decreased step length;Decreased step height  Distance: 80ft x 2  Comments: LLE weakeness noted with ambulation with verbal cueing required to maintain knee extension throughout stance phase to decrease buckling. More Ambulation?: No  Stairs/Curb  Stairs?: No     Balance  Posture: Fair  Sitting - Static: Good;-  Sitting - Dynamic: Fair;+  Standing - Static: Fair;+  Standing - Dynamic: Poor;-  Comments: standing balance assessed with RW; pt able to sit unsupported independently     Standing marches, standing heel raises x10; seated LAQs, seated hip abduction x10   Comments: Standing exercises with bilateral UE on RW    AM-PAC Score  AM-PAC Inpatient Mobility Raw Score : 19 (02/24/23 1519)  AM-PAC Inpatient T-Scale Score : 45.44 (02/24/23 1519)  Mobility Inpatient CMS 0-100% Score: 41.77 (02/24/23 1519)  Mobility Inpatient CMS G-Code Modifier : CK (02/24/23 1519)    Goals  Short Term Goals  Time Frame for Short Term Goals: 14 visits  Short Term Goal 1: independent bed mobility  Short Term Goal 2: independent transfers  Short Term Goal 3: independent gait with rwalker x 50'  Short Term Goal 4: stair ambulation x 4 steps with 1 HR with min A+1. STG 4 met. Updated STG 4: Stair ambulation x4 steps with 1 HR with CGA of her grandson.   Patient Goals   Patient Goals : get rid of back pain       Education  Patient Education  Education Given To: Patient  Education Provided: Fall Prevention Strategies  Education Provided Comments: Proper fit of RW; standing exercises  Education Method: Verbal  Barriers to Learning: None  Education Outcome: Continued education needed;Demonstrated understanding      Therapy Time   Individual Concurrent Group Co-treatment   Time In 1340         Time Out 1406         Minutes 26         Timed Code Treatment Minutes: 23 Minutes       Lonnie Goyal PT

## 2023-02-24 NOTE — PROGRESS NOTES
Caller: Narciso Romano    Relationship: Self    Best call back number:  [phone] (M)    Medication needed:   Requested Prescriptions     Pending Prescriptions Disp Refills   • metoprolol succinate XL (Toprol XL) 25 MG 24 hr tablet        Sig: Take 1 tablet by mouth 2 (two) times a day.       When do you need the refill by: 12/28/20    What details did the patient provide when requesting the medication: PATIENT HAS 2 PILLS LEFT    Does the patient have less than a 3 day supply:  [x] Yes  [] No    What is the patient's preferred pharmacy: [id number] - [place], [place] - [address] [place] [address] RD. AT [facility] [phone] [facility] [phone]               Comprehensive Nutrition Assessment    Type and Reason for Visit:  Initial, RD Nutrition Re-Screen/LOS    Nutrition Recommendations/Plan:   Continue current diet. Encourage/monitor PO intakes as tolerated. Will monitor plan of care. Malnutrition Assessment:  Malnutrition Status:  No malnutrition (02/24/23 0707)    Context:  Chronic Illness       Nutrition Assessment:    Chart reviewed/pt seen for length of stay. Admitted with c/o lower back pain x 2 weeks. PMH: h/o osteoporotic fracture and h/o spinal fusion; GERD, HTN. S/p kyphoplasty on 2/22. Pt reports eating well and having a good appetite. Pt ate % of breakfast today. Per chart review, recorded PO intakes of % noted. Last BM 2/24. Labs reviewed. Meds include: Colace, Remeron, Glycolax, MVI. Nutrition Related Findings:    Labs/Meds reviewed. Last BM 2/24. Wound Type: Surgical Incision (to back)       Current Nutrition Intake & Therapies:    Average Meal Intake: %  Average Supplements Intake: None Ordered  ADULT DIET; Regular    Anthropometric Measures:  Height: 4' 11\" (149.9 cm)  Ideal Body Weight (IBW): 95 lbs (43 kg)    Admission Body Weight: 120 lb (54.4 kg)  Current Body Weight: 120 lb (54.4 kg), 126.3 % IBW. Weight Source: Stated  Current BMI (kg/m2): 24.2  Usual Body Weight: 121 lb 6.4 oz (55.1 kg) (9/30/22 bed scale per chart review)  % Weight Change (Calculated): -1.2                    BMI Categories: Normal Weight (BMI 18.5-24. 9)    Estimated Daily Nutrient Needs:  Energy Requirements Based On: Kcal/kg  Weight Used for Energy Requirements: Admission  Energy (kcal/day): 0032-7717 kcals/day  Weight Used for Protein Requirements: Admission  Protein (g/day): 80 gm pro/day  Method Used for Fluid Requirements: 1 ml/kcal  Fluid (ml/day): 4197-7354 mL/day or per MD    Nutrition Diagnosis:   No nutrition diagnosis at this time    Nutrition Interventions:   Food and/or Nutrient Delivery: Continue Current Diet  Nutrition Education/Counseling: No recommendation at this time  Coordination of Nutrition Care: Continue to monitor while inpatient  Plan of Care discussed with: Patient; RN    Nutrition Monitoring and Evaluation:   Behavioral-Environmental Outcomes: None Identified  Food/Nutrient Intake Outcomes: Food and Nutrient Intake  Physical Signs/Symptoms Outcomes: Biochemical Data, GI Status, Fluid Status or Edema, Weight, Skin    Discharge Planning:    No discharge needs at this time     Nasim Contreras, [address] [place] [address],   Contact: [phone]

## 2023-02-24 NOTE — PROGRESS NOTES
[facility]  Office: [address], DO, Edwina Justen, DO, Tavares Phlegm, DO, Joann Muñoz Blood, DO, Subha Wyatt MD, Anette Quiles MD, Swapnil Holt MD, Kelly Brooks MD,  Melissa Shaw MD, Sarah Champagne MD, Kirsten Barreto, DO, Herbie Flowers MD,  Sharda Medrano MD, Sai Guzman MD, Pricilla Jo, DO, Valente Casper MD, Joanna Bolton MD, Cas Santiago, DO, Sathish Srinivasan MD, Amina Faith MD, Rita Fuentes MD, Kelli Mills MD, Marianne Romero, DO, Izabella Augustin MD, Roseline Oneal MD, Aguila Perry, Misael Pitts, CNP, Estee Santacruz, CNP, Salvatore Georges, CNP,  Sergio Valle, HEYDI, Gaetano Tovar, CNP, Jennifer Saleh, CNP, Kishore Bell, CNP, Leilani Walker, CNP, Antony Libman, CNP, Bree Meneses PA-C, Yarelis Del Cid, CNS, Kwame Hayward, CNP, Adan Noonan, [address] [facility]    Progress Note    2/24/2023    11:45 AM    Name:   Heidi Jamil  MRN:     [id number]     Acct:      [de-identified]   Room:   [address] [place] [address] Day:  6  Admit Date:  2/18/2023 11:26 AM    PCP:   PABLITO Chacon CNP  Code Status:  Full Code    Subjective:     C/C:   Chief Complaint   Patient presents with    Back Pain     Interval History Status: not changed. Patient states her pain is not changed since surgery but looks comfortable. Afebrile. Saturations normal on RA when she is sitting and doing IS. Needs oxygen when she lays in bed. Encouraged to continue IS. CXr shows atelectasis. Clinically no signs of pneumonia. Will stop antibiotics. BP better today. Norvasc held due to hypotension. She is on 1 l/oxygen via NC now. No fever. Vitals stable. She has no fever, cough or wheezing. She has h/o smoking 1/2 ppd but quit 30 years ago. She c/o pain with deep breathing. Labs and other vitals reviewed.           Brief History:     Patient was admitted thru ER with:  Bharat Colunga is a 81 y.o. female who presents with lower back pain for the past 2 weeks. Patient's grandchildren and son are in the room and provide additional history as patient is hard of hearing. Patient and patient's children report that she had a spinal fusion with Dr. Agustin Isabel at [facility] last October. Patient became involved with therapy and noticed an improvement in her back pain at that time. However for the past 2 weeks her  has been hospitalized and she had been sitting in a hard nonsupportive chair for several hours a day after which she experienced worsening upper back pain that has not been improved with physical therapy. Patient has been taking Motrin with minimal improvement in addition to tramadol which did not seem to help at all. Patient has a history of osteoporotic fracture without trauma in the past.  Patient denies any incontinence but her family reports that she has been avoiding using any laxatives for her normal constipation as it is painful for her to have to go to the restroom. Patient denies any numbness in the saddle region or in lower extremities. She reports that she feels like she is weak in her lower extremities because of the pain when she is walking. Her family reports that she is having difficulty transferring and getting around due to the pain. Patient normally uses a cane to ambulate. \"        Review of Systems:     Review of Systems   Constitutional:  Negative for chills and fever. Respiratory:  Negative for cough, shortness of breath and wheezing. Cardiovascular:  Negative for chest pain, palpitations and leg swelling. Gastrointestinal:  Negative for abdominal pain and constipation. Genitourinary:  Negative for decreased urine volume. Musculoskeletal:  Positive for back pain. Negative for arthralgias. Neurological:  Negative for dizziness, weakness, numbness and headaches. Psychiatric/Behavioral:  Positive for sleep disturbance. The patient is nervous/anxious. Medications: Allergies:  No Known Allergies    Current Meds:   Scheduled Meds:    cefTRIAXone (ROCEPHIN) IV  1,000 mg IntraVENous Q24H    docusate sodium  100 mg Oral Daily    polyethylene glycol  17 g Oral Daily    vitamin D3  400 Units Oral Daily    calcium carbonate  1,000 mg Oral Daily    [Held by provider] amLODIPine  5 mg Oral Daily    mirtazapine  7.5 mg Oral Nightly    therapeutic multivitamin-minerals  1 tablet Oral Daily    [Held by provider] lisinopril  40 mg Oral Daily    pantoprazole  40 mg Oral QAM AC    venlafaxine  37.5 mg Oral BID    sodium chloride flush  5-40 mL IntraVENous 2 times per day    enoxaparin  40 mg SubCUTAneous Daily     Continuous Infusions:    sodium chloride       PRN Meds: traMADol, Baclofen, sodium chloride flush, sodium chloride, potassium chloride **OR** potassium alternative oral replacement **OR** potassium chloride, ondansetron **OR** ondansetron, bisacodyl, acetaminophen **OR** acetaminophen, LORazepam    Data:     Past Medical History:   has a past medical history of Acute hepatitis C virus infection without hepatic coma, Anxiety and depression, Colitis, GERD (gastroesophageal reflux disease), HTN (hypertension), and Restless legs. Social History:   reports that she quit smoking about 43 years ago. Her smoking use included cigarettes. She has a 7.50 pack-year smoking history. She has never used smokeless tobacco. She reports current alcohol use. She reports that she does not use drugs. Family History:   Family History   Problem Relation Age of Onset    Heart Disease Father        Vitals:  /64   Pulse 69   Temp 97.3 °F (36.3 °C) (Oral)   Resp 20   Wt 120 lb (54.4 kg)   SpO2 99%   BMI 24.24 kg/m²   Temp (24hrs), Av.2 °F (36.8 °C), Min:97.3 °F (36.3 °C), Max:98.8 °F (37.1 °C)    No results for input(s): POCGLU in the last 72 hours. I/O (24Hr):     Intake/Output Summary (Last 24 hours) at 2023 1149  Last data filed at 2023 2937  Gross per 24 hour   Intake 855.57 ml   Output 975 ml   Net -119.43 ml         Labs:  Hematology:  Recent Labs     02/22/23  0859 02/23/23  1922 02/24/23  0350   WBC  --  8.9 7.5   RBC  --  2.93* 2.87*   HGB  --  9.9* 9.7*   HCT  --  29.6* 29.1*   MCV  --  101.0 101.4   MCH  --  33.8* 33.8*   MCHC  --  33.4 33.3   RDW  --  12.7 12.8   PLT  --  167 146   MPV  --  9.6 9.6   INR 1.1  --   --        Chemistry:  Recent Labs     02/22/23  0859 02/24/23  0350    142   K 4.1 4.2    106   CO2 30 31   GLUCOSE 93 99   BUN 7* 18   CREATININE 0.59 0.79   ANIONGAP 7* 5*   LABGLOM >60 >60   CALCIUM 8.6 8.5*       No results for input(s): PROT, LABALBU, LABA1C, B6SDTAG, Y3OAQNY, FT4, TSH, AST, ALT, LDH, GGT, ALKPHOS, LABGGT, BILITOT, BILIDIR, AMMONIA, AMYLASE, LIPASE, LACTATE, CHOL, HDL, LDLCHOLESTEROL, CHOLHDLRATIO, TRIG, VLDL, SIC24YD, PHENYTOIN, PHENYF, URICACID, POCGLU in the last 72 hours. ABG:No results found for: POCPH, PHART, PH, POCPCO2, WHP1FLY, PCO2, POCPO2, PO2ART, PO2, POCHCO3, VXL6LBE, HCO3, NBEA, PBEA, BEART, BE, THGBART, THB, XUQ2UXU, TOEJ9NGT, V0NBYAVL, O2SAT, FIO2  Lab Results   Component Value Date/Time    SPECIAL NOT REPORTED 02/08/2021 09:45 AM     Lab Results   Component Value Date/Time    CULTURE NO SIGNIFICANT GROWTH 07/07/2022 02:30 PM       Radiology:  CT THORACIC SPINE WO CONTRAST    Result Date: 2/18/2023  1. Global decreased bone mineral density. 2. Normal thoracic spine alignment with new/acute T10 insufficiency fracture with approximately 5% loss of height. 3. Normal lumbar spine alignment with stable diffuse degenerative changes. 4. Compared to the prior 2022, imaging there is progressive height loss at L1, with approximately 60% loss of height, L2, with approximately 10% loss of height, and L3, with approximately 15% loss of height. All of these are concerning for new/acute insufficiency fractures.  5. Moderate-severe L3/L4, canal stenosis related to degenerative changes with superimposed insufficiency fracture posterior cortical displacement. CT LUMBAR SPINE WO CONTRAST    Result Date: 2/18/2023  1. Global decreased bone mineral density. 2. Normal thoracic spine alignment with new/acute T10 insufficiency fracture with approximately 5% loss of height. 3. Normal lumbar spine alignment with stable diffuse degenerative changes. 4. Compared to the prior 2022, imaging there is progressive height loss at L1, with approximately 60% loss of height, L2, with approximately 10% loss of height, and L3, with approximately 15% loss of height. All of these are concerning for new/acute insufficiency fractures. 5. Moderate-severe L3/L4, canal stenosis related to degenerative changes with superimposed insufficiency fracture posterior cortical displacement. MRI THORACIC SPINE WO CONTRAST    Result Date: 2/18/2023  Multiple compression fractures as above. Fluid signal within the T10 vertebral body suggests it as being relatively acute is compared to the others. MRI LUMBAR SPINE WO CONTRAST    Result Date: 2/18/2023  Multiple compression fractures as above. Moderate disc marginal osteophyte and spinal stenosis at L1-2. Multilevel bilateral neural foraminal narrowing. XR LUMBAR SPINE 1 VW    Result Date: 2/19/2023  Thoracic spine: 1. Known compression deformity of T10, L1, L2, and L4. Vertebral plana at L1. Prior vertebroplasty at L3 and T12. 2. Mild degenerative changes within the remainder of the visualized thoracic spine. Upper thoracic vertebral bodies are obscured. 3. Kyphosis centered at T12. Lumbar spine: 1. Evidence of prior vertebroplasty at L3 and T12. Remote compression deformities of L4 and L2. Vertebral plana at L1. Known T10 compression deformity. 2. Underlying diffuse osteopenia. XR CHEST PORTABLE    Result Date: 2/18/2023  1. COPD. No evidence of acute congestive heart failure.  2. Indeterminate bilateral peripheral pulmonary opacities which could represent nodules and possible cavitary lesion versus subsegmental consolidation. When clinically able, CT thorax without contrast can be performed to further evaluate the findings. XR THORACIC SPINE 1 VW    Result Date: 2/19/2023  Thoracic spine: 1. Known compression deformity of T10, L1, L2, and L4. Vertebral plana at L1. Prior vertebroplasty at L3 and T12. 2. Mild degenerative changes within the remainder of the visualized thoracic spine. Upper thoracic vertebral bodies are obscured. 3. Kyphosis centered at T12. Lumbar spine: 1. Evidence of prior vertebroplasty at L3 and T12. Remote compression deformities of L4 and L2. Vertebral plana at L1. Known T10 compression deformity. 2. Underlying diffuse osteopenia. Physical Examination:        Physical Exam  Vitals and nursing note reviewed. Constitutional:       Appearance: Normal appearance. She is not ill-appearing. HENT:      Head: Normocephalic and atraumatic. Eyes:      Extraocular Movements: Extraocular movements intact. Conjunctiva/sclera: Conjunctivae normal.      Pupils: Pupils are equal, round, and reactive to light. Cardiovascular:      Rate and Rhythm: Normal rate and regular rhythm. Pulmonary:      Effort: Pulmonary effort is normal. No respiratory distress. Breath sounds: Rales (b/l bases. ) present. No wheezing. Musculoskeletal:      Right lower leg: No edema. Left lower leg: No edema. Neurological:      General: No focal deficit present. Mental Status: She is alert and oriented to person, place, and time. Cranial Nerves: No cranial nerve deficit. Sensory: No sensory deficit. Motor: No weakness.        Assessment:        Hospital Problems             Last Modified POA    * (Principal) Closed T10 spinal fracture ([address] 2/19/2023 Yes    Age-related osteoporosis with current pathological fracture 2/18/2023 Yes    Compression fx, lumbar spine, closed, initial encounter ([address] 2/18/2023 Yes    Spinal stenosis of lumbar region without neurogenic claudication 2/18/2023 Yes    Hypokalemia 2/18/2023 Yes    Troponin level elevated 2/18/2023 Yes    HTN (hypertension) 2/18/2023 Yes    Anemia, normocytic normochromic 2/18/2023 Yes    Plan:        Acute T 10 fracture- awaiting placement. S/p Kyphoplasty. Multiple fractures noted on MRI in thoracic and lumbar spine. Encourage sitting up and deep breathing. Hold Lovenox. Brace from home obtained. PT. Continue Tramadol and Baclofen. Acute respiratory failure - secondary to atelectasis. on 1-2 liter oxygen currently via NC while laying down. Not requiring oxygen in daytime. . Advised to continue IS. Sit up more. Discussed CXR findings with Grand daughter and RN. HTN- low BP yesterday. Given fluids overnight and BP meds held which helped. Will continue to hold Norvasc and decrease Lisinopril. Anxiety disorder- on Venlafaxine and Remeron for sleep. Osteoporosis- continue calcium/Vit d. Needs Dexa scan completed OP  Normocytic anemia- Follow up with GI op  Chronic hepatitis C- Normal LFT's. Liver US done last year. Follow up with GI    DVT prophylaxis- Restarted Lovenox per ortho notes  Awaiting Rehab placement.  Continue PT/OT      Salvador Pantoja MD  2/24/2023  11:45 AM

## 2023-02-24 NOTE — PLAN OF CARE
Problem: Discharge Planning  Goal: Discharge to home or other facility with appropriate resources  Outcome: Progressing     Problem: Pain  Goal: Verbalizes/displays adequate comfort level or baseline comfort level  Outcome: Progressing     Problem: Skin/Tissue Integrity  Goal: Absence of new skin breakdown  Description: 1. Monitor for areas of redness and/or skin breakdown  2. Assess vascular access sites hourly  3. Every 4-6 hours minimum:  Change oxygen saturation probe site  4. Every 4-6 hours:  If on nasal continuous positive airway pressure, respiratory therapy assess nares and determine need for appliance change or resting period.   Outcome: Progressing     Problem: Safety - Adult  Goal: Free from fall injury  Outcome: Progressing     Problem: ABCDS Injury Assessment  Goal: Absence of physical injury  Outcome: Progressing     Problem: Respiratory - Adult  Goal: Achieves optimal ventilation and oxygenation  Outcome: Progressing

## 2023-02-25 VITALS
OXYGEN SATURATION: 96 % | HEART RATE: 88 BPM | HEIGHT: 59 IN | RESPIRATION RATE: 20 BRPM | DIASTOLIC BLOOD PRESSURE: 76 MMHG | BODY MASS INDEX: 24.19 KG/M2 | SYSTOLIC BLOOD PRESSURE: 134 MMHG | WEIGHT: 120 LBS | TEMPERATURE: 99 F

## 2023-02-25 PROBLEM — R77.8 TROPONIN LEVEL ELEVATED: Status: RESOLVED | Noted: 2023-02-18 | Resolved: 2023-02-25

## 2023-02-25 PROBLEM — R79.89 TROPONIN LEVEL ELEVATED: Status: RESOLVED | Noted: 2023-02-18 | Resolved: 2023-02-25

## 2023-02-25 PROBLEM — E87.6 HYPOKALEMIA: Status: RESOLVED | Noted: 2023-02-18 | Resolved: 2023-02-25

## 2023-02-25 PROCEDURE — 6370000000 HC RX 637 (ALT 250 FOR IP)

## 2023-02-25 PROCEDURE — 2580000003 HC RX 258

## 2023-02-25 PROCEDURE — 6360000002 HC RX W HCPCS

## 2023-02-25 PROCEDURE — 99232 SBSQ HOSP IP/OBS MODERATE 35: CPT | Performed by: INTERNAL MEDICINE

## 2023-02-25 RX ORDER — CALCIUM CARBONATE 200(500)MG
1000 TABLET,CHEWABLE ORAL DAILY
Qty: 60 TABLET | Refills: 0 | Status: SHIPPED | OUTPATIENT
Start: 2023-02-25 | End: 2023-03-27

## 2023-02-25 RX ORDER — PSEUDOEPHEDRINE HCL 30 MG
100 TABLET ORAL DAILY PRN
Qty: 30 CAPSULE | Refills: 0 | Status: SHIPPED | OUTPATIENT
Start: 2023-02-25

## 2023-02-25 RX ORDER — LISINOPRIL 20 MG/1
20 TABLET ORAL DAILY
Qty: 30 TABLET | Refills: 3 | Status: SHIPPED | OUTPATIENT
Start: 2023-02-26

## 2023-02-25 RX ORDER — BACLOFEN 5 MG/1
5 TABLET ORAL 3 TIMES DAILY PRN
Qty: 30 TABLET | Refills: 0 | Status: SHIPPED | OUTPATIENT
Start: 2023-02-25

## 2023-02-25 RX ORDER — TRAMADOL HYDROCHLORIDE 50 MG/1
50 TABLET ORAL EVERY 6 HOURS PRN
Qty: 12 TABLET | Refills: 0 | Status: SHIPPED | OUTPATIENT
Start: 2023-02-25 | End: 2023-02-28

## 2023-02-25 RX ORDER — LISINOPRIL 20 MG/1
20 TABLET ORAL DAILY
Status: DISCONTINUED | OUTPATIENT
Start: 2023-02-25 | End: 2023-02-25 | Stop reason: HOSPADM

## 2023-02-25 RX ORDER — POLYETHYLENE GLYCOL 3350 17 G/17G
17 POWDER, FOR SOLUTION ORAL DAILY
Qty: 527 G | Refills: 1 | Status: SHIPPED | OUTPATIENT
Start: 2023-02-26 | End: 2023-03-28

## 2023-02-25 RX ORDER — OMEGA-3S/DHA/EPA/FISH OIL/D3 300MG-1000
400 CAPSULE ORAL DAILY
Qty: 30 TABLET | Refills: 0 | Status: SHIPPED | OUTPATIENT
Start: 2023-02-26

## 2023-02-25 RX ADMIN — ENOXAPARIN SODIUM 40 MG: 100 INJECTION SUBCUTANEOUS at 08:33

## 2023-02-25 RX ADMIN — BACLOFEN 5 MG: 5 TABLET ORAL at 08:32

## 2023-02-25 RX ADMIN — SODIUM CHLORIDE, PRESERVATIVE FREE 10 ML: 5 INJECTION INTRAVENOUS at 08:34

## 2023-02-25 RX ADMIN — DOCUSATE SODIUM 100 MG: 100 CAPSULE ORAL at 08:35

## 2023-02-25 RX ADMIN — LISINOPRIL 20 MG: 20 TABLET ORAL at 08:29

## 2023-02-25 RX ADMIN — VENLAFAXINE HYDROCHLORIDE 37.5 MG: 37.5 CAPSULE, EXTENDED RELEASE ORAL at 08:32

## 2023-02-25 RX ADMIN — Medication 1 TABLET: at 08:33

## 2023-02-25 RX ADMIN — CHOLECALCIFEROL TAB 10 MCG (400 UNIT) 400 UNITS: 10 TAB at 08:32

## 2023-02-25 RX ADMIN — PANTOPRAZOLE SODIUM 40 MG: 40 TABLET, DELAYED RELEASE ORAL at 08:31

## 2023-02-25 ASSESSMENT — ENCOUNTER SYMPTOMS
BACK PAIN: 1
COUGH: 0
WHEEZING: 0
SHORTNESS OF BREATH: 0
ABDOMINAL PAIN: 0
CONSTIPATION: 0

## 2023-02-25 NOTE — PROGRESS NOTES
[facility] given and explained. Granddaughter and Son at the time of discharge. Questions answered. Vitals were stable.

## 2023-02-25 NOTE — CARE COORDINATION
In to meet with patient and family member at bedside - discussed peer to peer process. Patient expresses desire to still go to [facility], if denied does not want to go to SNF and will go home.

## 2023-02-25 NOTE — PROGRESS NOTES
CLINICAL PHARMACY NOTE: MEDS TO BEDS    Total # of Prescriptions Filled: 0   The following medications were delivered to the patient:      Additional Documentation:   Pt came to pharmacy counter to  medications, nothing was delivered.

## 2023-02-25 NOTE — PROGRESS NOTES
[facility]  Office: [address], DO, Willis Lazaro, DO, Herlinda Roman, DO, Ni Hallman Tracy, DO, Nikki Serrano MD, Anthony Clark MD, Vera Mayen MD, Pascual Rolon MD,  Porfirio Demarco MD, Trung Parker MD, Jesse Garcia, DO, Basil Finnegan MD,  Shani Wyatt MD, Dora Medellin MD, Apollo Steel, DO, Khanh Hernandez MD, Brent Babinski, MD, Alex Bajwa, DO, Chevy He MD, Marva Lezama MD, Clare Lawson MD, Candida Pete MD, Noemí Galarza DO, Dangelo Patterson MD, Lenka Gómez MD, Daniela Sullivan, CNP,  John Castillo, CNP, Alexandre Adams, CNP, Jimmie Olivera, CNP,  Ashley Cooley, [facility], Stacie Narayanan, CNP, Erin Reed, CNP, Em Moss CNP, Celi Willis, CNP, Shaka Alonzo, CNP, KADEEM YinC, Lilian Milligan, CNS, Marla Guillen, CNP, Evelio Ellis, CNP         Carlo[address] Rowell [address]    Progress Note    2/25/2023    8:20 AM    Name:   Araceli Goodman  MRN:     [id number]     Acct:      [de-identified]   Room:   [address] [place] [address] Day:  7  Admit Date:  2/18/2023 11:26 AM    PCP:   PABLITO Damico CNP  Code Status:  Full Code    Subjective:     C/C:   Chief Complaint   Patient presents with    Back Pain     Interval History Status: not changed. Patient looks comfortable. She just received Tramadol. She states pain is improved. She has been off oxygen since yesterday. Saturating 95% on RA. She is working on IS. Her BP meds have been on hold for a day. Current /78. Earlt this AM one BP rewading as high. I will stop Norvasc and decrease Lisinopril. CXr shows atelectasis. Clinically no signs of pneumonia. No fever. Vitals stable. She has no fever, cough or wheezing. She has h/o smoking 1/2 ppd but quit 30 years ago. She c/o pain with deep breathing but improving. Labs and other vitals reviewed.           Brief History:     Patient was admitted thru ER with:  Valentin Germain is a 81 y.o. female who presents with lower back pain for the past 2 weeks. Patient's grandchildren and son are in the room and provide additional history as patient is hard of hearing. Patient and patient's children report that she had a spinal fusion with Dr. Guanaco Ramon at [facility] last October. Patient became involved with therapy and noticed an improvement in her back pain at that time. However for the past 2 weeks her  has been hospitalized and she had been sitting in a hard nonsupportive chair for several hours a day after which she experienced worsening upper back pain that has not been improved with physical therapy. Patient has been taking Motrin with minimal improvement in addition to tramadol which did not seem to help at all. Patient has a history of osteoporotic fracture without trauma in the past.  Patient denies any incontinence but her family reports that she has been avoiding using any laxatives for her normal constipation as it is painful for her to have to go to the restroom. Patient denies any numbness in the saddle region or in lower extremities. She reports that she feels like she is weak in her lower extremities because of the pain when she is walking. Her family reports that she is having difficulty transferring and getting around due to the pain. Patient normally uses a cane to ambulate. \"        Review of Systems:     Review of Systems   Constitutional:  Negative for chills and fever. Respiratory:  Negative for cough, shortness of breath and wheezing. Cardiovascular:  Negative for chest pain, palpitations and leg swelling. Gastrointestinal:  Negative for abdominal pain and constipation. Genitourinary:  Negative for decreased urine volume. Musculoskeletal:  Positive for back pain. Negative for arthralgias. Neurological:  Negative for dizziness, weakness, numbness and headaches. Psychiatric/Behavioral:  Negative for sleep disturbance.  The patient is not nervous/anxious. Medications: Allergies:  No Known Allergies    Current Meds:   Scheduled Meds:    docusate sodium  100 mg Oral Daily    polyethylene glycol  17 g Oral Daily    vitamin D3  400 Units Oral Daily    calcium carbonate  1,000 mg Oral Daily    [Held by provider] amLODIPine  5 mg Oral Daily    mirtazapine  7.5 mg Oral Nightly    therapeutic multivitamin-minerals  1 tablet Oral Daily    lisinopril  40 mg Oral Daily    pantoprazole  40 mg Oral QAM AC    venlafaxine  37.5 mg Oral BID    sodium chloride flush  5-40 mL IntraVENous 2 times per day    enoxaparin  40 mg SubCUTAneous Daily     Continuous Infusions:    sodium chloride       PRN Meds: traMADol, Baclofen, sodium chloride flush, sodium chloride, potassium chloride **OR** potassium alternative oral replacement **OR** potassium chloride, ondansetron **OR** ondansetron, bisacodyl, acetaminophen **OR** acetaminophen, LORazepam    Data:     Past Medical History:   has a past medical history of Acute hepatitis C virus infection without hepatic coma, Anxiety and depression, Colitis, GERD (gastroesophageal reflux disease), HTN (hypertension), and Restless legs. Social History:   reports that she quit smoking about 43 years ago. Her smoking use included cigarettes. She has a 7.50 pack-year smoking history. She has never used smokeless tobacco. She reports current alcohol use. She reports that she does not use drugs. Family History:   Family History   Problem Relation Age of Onset    Heart Disease Father        Vitals:  BP (!) 150/74   Pulse 83   Temp 99.3 °F (37.4 °C) (Oral)   Resp 22   Ht 4' 11\" (1.499 m)   Wt 120 lb (54.4 kg)   SpO2 94%   BMI 24.24 kg/m²   Temp (24hrs), Av.4 °F (36.9 °C), Min:97.3 °F (36.3 °C), Max:99.3 °F (37.4 °C)    No results for input(s): POCGLU in the last 72 hours. I/O (24Hr):     Intake/Output Summary (Last 24 hours) at 2023 0820  Last data filed at 2023 1727  Gross per 24 hour   Intake 553.97 ml Output 200 ml   Net 353.97 ml         Labs:  Hematology:  Recent Labs     02/22/23  0859 02/23/23  1922 02/24/23  0350   WBC  --  8.9 7.5   RBC  --  2.93* 2.87*   HGB  --  9.9* 9.7*   HCT  --  29.6* 29.1*   MCV  --  101.0 101.4   MCH  --  33.8* 33.8*   MCHC  --  33.4 33.3   RDW  --  12.7 12.8   PLT  --  167 146   MPV  --  9.6 9.6   INR 1.1  --   --        Chemistry:  Recent Labs     02/22/23  0859 02/24/23  0350    142   K 4.1 4.2    106   CO2 30 31   GLUCOSE 93 99   BUN 7* 18   CREATININE 0.59 0.79   ANIONGAP 7* 5*   LABGLOM >60 >60   CALCIUM 8.6 8.5*       No results for input(s): PROT, LABALBU, LABA1C, C6TKZLA, H4EYEEU, FT4, TSH, AST, ALT, LDH, GGT, ALKPHOS, LABGGT, BILITOT, BILIDIR, AMMONIA, AMYLASE, LIPASE, LACTATE, CHOL, HDL, LDLCHOLESTEROL, CHOLHDLRATIO, TRIG, VLDL, FYW91XR, PHENYTOIN, PHENYF, URICACID, POCGLU in the last 72 hours. ABG:No results found for: POCPH, PHART, PH, POCPCO2, NIN1YYC, PCO2, POCPO2, PO2ART, PO2, POCHCO3, XQK0LSC, HCO3, NBEA, PBEA, BEART, BE, THGBART, THB, CHP8BUU, OSCC0PAF, O2SCAPLK, O2SAT, FIO2  Lab Results   Component Value Date/Time    SPECIAL NOT REPORTED 02/08/2021 09:45 AM     Lab Results   Component Value Date/Time    CULTURE NO SIGNIFICANT GROWTH 07/07/2022 02:30 PM       Radiology:  CT THORACIC SPINE WO CONTRAST    Result Date: 2/18/2023  1. Global decreased bone mineral density. 2. Normal thoracic spine alignment with new/acute T10 insufficiency fracture with approximately 5% loss of height. 3. Normal lumbar spine alignment with stable diffuse degenerative changes. 4. Compared to the prior 2022, imaging there is progressive height loss at L1, with approximately 60% loss of height, L2, with approximately 10% loss of height, and L3, with approximately 15% loss of height. All of these are concerning for new/acute insufficiency fractures.  5. Moderate-severe L3/L4, canal stenosis related to degenerative changes with superimposed insufficiency fracture posterior cortical displacement. CT LUMBAR SPINE WO CONTRAST    Result Date: 2/18/2023  1. Global decreased bone mineral density. 2. Normal thoracic spine alignment with new/acute T10 insufficiency fracture with approximately 5% loss of height. 3. Normal lumbar spine alignment with stable diffuse degenerative changes. 4. Compared to the prior 2022, imaging there is progressive height loss at L1, with approximately 60% loss of height, L2, with approximately 10% loss of height, and L3, with approximately 15% loss of height. All of these are concerning for new/acute insufficiency fractures. 5. Moderate-severe L3/L4, canal stenosis related to degenerative changes with superimposed insufficiency fracture posterior cortical displacement. MRI THORACIC SPINE WO CONTRAST    Result Date: 2/18/2023  Multiple compression fractures as above. Fluid signal within the T10 vertebral body suggests it as being relatively acute is compared to the others. MRI LUMBAR SPINE WO CONTRAST    Result Date: 2/18/2023  Multiple compression fractures as above. Moderate disc marginal osteophyte and spinal stenosis at L1-2. Multilevel bilateral neural foraminal narrowing. XR LUMBAR SPINE 1 VW    Result Date: 2/19/2023  Thoracic spine: 1. Known compression deformity of T10, L1, L2, and L4. Vertebral plana at L1. Prior vertebroplasty at L3 and T12. 2. Mild degenerative changes within the remainder of the visualized thoracic spine. Upper thoracic vertebral bodies are obscured. 3. Kyphosis centered at T12. Lumbar spine: 1. Evidence of prior vertebroplasty at L3 and T12. Remote compression deformities of L4 and L2. Vertebral plana at L1. Known T10 compression deformity. 2. Underlying diffuse osteopenia. XR CHEST PORTABLE    Result Date: 2/18/2023  1. COPD. No evidence of acute congestive heart failure.  2. Indeterminate bilateral peripheral pulmonary opacities which could represent nodules and possible cavitary lesion versus subsegmental consolidation. When clinically able, CT thorax without contrast can be performed to further evaluate the findings. XR THORACIC SPINE 1 VW    Result Date: 2/19/2023  Thoracic spine: 1. Known compression deformity of T10, L1, L2, and L4. Vertebral plana at L1. Prior vertebroplasty at L3 and T12. 2. Mild degenerative changes within the remainder of the visualized thoracic spine. Upper thoracic vertebral bodies are obscured. 3. Kyphosis centered at T12. Lumbar spine: 1. Evidence of prior vertebroplasty at L3 and T12. Remote compression deformities of L4 and L2. Vertebral plana at L1. Known T10 compression deformity. 2. Underlying diffuse osteopenia. Physical Examination:        Physical Exam  Vitals and nursing note reviewed. Constitutional:       Appearance: Normal appearance. She is not ill-appearing. HENT:      Head: Normocephalic and atraumatic. Eyes:      Extraocular Movements: Extraocular movements intact. Conjunctiva/sclera: Conjunctivae normal.      Pupils: Pupils are equal, round, and reactive to light. Cardiovascular:      Rate and Rhythm: Normal rate and regular rhythm. Pulmonary:      Effort: Pulmonary effort is normal. No respiratory distress. Breath sounds: Rales (b/l bases. ) present. No wheezing. Musculoskeletal:      Right lower leg: No edema. Left lower leg: No edema. Neurological:      General: No focal deficit present. Mental Status: She is alert and oriented to person, place, and time. Cranial Nerves: No cranial nerve deficit. Sensory: No sensory deficit. Motor: No weakness.        Assessment:        Hospital Problems             Last Modified POA    * (Principal) Closed T10 spinal fracture ([address] 2/19/2023 Yes    Age-related osteoporosis with current pathological fracture 2/18/2023 Yes    Compression fx, lumbar spine, closed, initial encounter ([address] 2/18/2023 Yes    Spinal stenosis of lumbar region without neurogenic claudication 2/18/2023 Yes    Hypokalemia 2/18/2023 Yes    Troponin level elevated 2/18/2023 Yes    HTN (hypertension) 2/18/2023 Yes    Anemia, normocytic normochromic 2/18/2023 Yes    Plan:        Acute T 10 fracture- awaiting placement. S/p Kyphoplasty. Multiple fractures noted on MRI in thoracic and lumbar spine. Encourage sitting up and deep breathing. PT. Continue Tramadol and Baclofen. Stable for discharge. Acute respiratory failure - Resolved. Off oxygen 24 hours. Secondary to atelectasis. Advised to continue IS. Sit up more. Discussed CXR findings with Grand daughter and RN. HTN- low BP yesterday. Given fluids overnight and BP meds held which helped. Stop  Norvasc and decrease Lisinopril. Anxiety disorder- on Venlafaxine and Remeron for sleep. Osteoporosis- continue calcium/Vit d. Needs Dexa scan completed OP  Normocytic anemia- Follow up with GI op  Chronic hepatitis C- Normal LFT's. Liver US done last year. Follow up with GI    DVT prophylaxis- Restarted Lovenox per ortho notes  Awaiting Rehab placement.  Continue PT/OT  Stable for discharge       Anil Cherry MD  2/25/2023  8:20 AM

## 2023-02-25 NOTE — PLAN OF CARE
Problem: Discharge Planning  Goal: Discharge to home or other facility with appropriate resources  2/25/2023 0520 by Enriqueta Guzman RN  Outcome: Progressing  Flowsheets (Taken 2/24/2023 2000)  Discharge to home or other facility with appropriate resources: Arrange for needed discharge resources and transportation as appropriate  2/24/2023 1828 by Nae Blackwell RN  Outcome: Progressing  Flowsheets (Taken 2/24/2023 0800)  Discharge to home or other facility with appropriate resources:   Identify barriers to discharge with patient and caregiver   Arrange for needed discharge resources and transportation as appropriate   Identify discharge learning needs (meds, wound care, etc)   Arrange for interpreters to assist at discharge as needed   Refer to discharge planning if patient needs post-hospital services based on physician order or complex needs related to functional status, cognitive ability or social support system     Problem: Pain  Goal: Verbalizes/displays adequate comfort level or baseline comfort level  2/25/2023 0520 by Enriqueta Guzman RN  Outcome: Progressing  2/24/2023 1828 by Nae Blackwell RN  Outcome: Progressing  Flowsheets (Taken 2/24/2023 0800)  Verbalizes/displays adequate comfort level or baseline comfort level:   Encourage patient to monitor pain and request assistance   Assess pain using appropriate pain scale   Administer analgesics based on type and severity of pain and evaluate response   Implement non-pharmacological measures as appropriate and evaluate response   Consider cultural and social influences on pain and pain management   Notify Licensed Independent Practitioner if interventions unsuccessful or patient reports new pain     Problem: Skin/Tissue Integrity  Goal: Absence of new skin breakdown  Description: 1. Monitor for areas of redness and/or skin breakdown  2. Assess vascular access sites hourly  3. Every 4-6 hours minimum:  Change oxygen saturation probe site  4.   Every 4-6 hours: If on nasal continuous positive airway pressure, respiratory therapy assess nares and determine need for appliance change or resting period.   2/25/2023 0520 by Keily Smith RN  Outcome: Progressing  2/24/2023 1828 by John Ro RN  Outcome: Progressing     Problem: Safety - Adult  Goal: Free from fall injury  2/25/2023 0520 by Keily Smith RN  Outcome: Progressing  Flowsheets (Taken 2/24/2023 2000)  Free From Fall Injury: Instruct family/caregiver on patient safety  2/24/2023 1828 by Jonh Ro RN  Outcome: Progressing     Problem: ABCDS Injury Assessment  Goal: Absence of physical injury  2/25/2023 0520 by Keily Smith RN  Outcome: Progressing  Flowsheets (Taken 2/24/2023 2000)  Absence of Physical Injury: Implement safety measures based on patient assessment  2/24/2023 1828 by John Ro RN  Outcome: Progressing     Problem: Respiratory - Adult  Goal: Achieves optimal ventilation and oxygenation  2/25/2023 0520 by Keily Smith RN  Outcome: Progressing  Flowsheets (Taken 2/24/2023 2000)  Achieves optimal ventilation and oxygenation: Assess for changes in mentation and behavior  2/24/2023 1828 by John Ro RN  Outcome: Progressing  Flowsheets (Taken 2/24/2023 0800)  Achieves optimal ventilation and oxygenation:   Assess for changes in respiratory status   Assess for changes in mentation and behavior   Position to facilitate oxygenation and minimize respiratory effort   Oxygen supplementation based on oxygen saturation or arterial blood gases   Respiratory therapy support as indicated   Encourage broncho-pulmonary hygiene including cough, deep breathe, incentive spirometry   Assess and instruct to report shortness of breath or any respiratory difficulty

## 2023-03-09 NOTE — PROGRESS NOTES
Physician Progress Note      Efrain Rivera  [facility] #:                  [id number]  :                       1940  ADMIT DATE:       2023 11:26 AM  DISCH DATE:        2023 2:55 PM  RESPONDING  PROVIDER #:        Sunday Holman MD          QUERY TEXT:    Patient admitted with pathological fracture s/p surgery . Per progress note on    and  noted Acute respiratory failure secondary to atelectasis is   documented treated with IS and one liter of oxygen. respirations shallow lungs   diminished. cxr shows atelectasis and no acute findings. discharge summary   states Patient did have atelectasis bilaterally due to splinting from pain and   on ability to take deep breaths. She was requiring oxygen for a few days   secondary to hypoxia due to atelectasis and acute respiratory insufficiency. Please clarify one of the following      The medical record reflects the following:  Risk Factors: surgery, age  Clinical Indicators: admitted with pathological fracture s/p surgery . Per   progress note on  and  noted Acute respiratory failure secondary to   atelectasis is documented treated with IS and one liter of oxygen. respirations shallow lungs diminished. cxr shows atelectasis and no acute   findings. discharge summary states Patient did have atelectasis bilaterally   due to splinting from pain and on ability to take deep breaths. She was   requiring oxygen for a few days secondary to hypoxia due to atelectasis and   acute respiratory insufficiency.   Treatment: oxygen, IS  , monitoring pulse oxymetry    Acute Respiratory Failure Clinical Indicators per  MS-DRG Training Guide and   Quick Reference Guide:  pO2 < 60 mmHg or SpO2 (pulse oximetry) < 91% breathing room air  pCO2 > 50 and pH < 7.35  P/F ratio (pO2 / FIO2) < 300  pO2 decrease or pCO2 increase by 10 mmHg from baseline (if known)  Supplemental oxygen of 40% or more  Presence of respiratory distress, tachypnea, dyspnea, shortness of breath,   wheezing  Unable to speak in complete sentences  Use of accessory muscles to breathe  Extreme anxiety and feeling of impending doom  Tripod position  Confusion/altered mental status/obtunded    Thank Kaiser Avila RN BSN  CCDS  Email [email or web address]  Cell [phone]  office hours M-F 6am to 2:30pm  Options provided:  -- Acute Respiratory Failure as evidenced by, Please document evidence. -- Acute Respiratory Failure ruled out after study and respiratory   insufficiency with hypoxia due to atelectasis confirmed  -- Other - I will add my own diagnosis  -- Disagree - Not applicable / Not valid  -- Disagree - Clinically unable to determine / Unknown  -- Refer to Clinical Documentation Reviewer    PROVIDER RESPONSE TEXT:    This patient is in acute respiratory failure as evidenced by Saturation <90%   on RA due to atelectasis. she had tachypnea. Needed oxygen    Query created by:  Casa Remy on 3/9/2023 10:07 AM      Electronically signed by:  Jaison Meneses MD 3/9/2023 10:19 AM

## 2023-03-23 ENCOUNTER — HOSPITAL ENCOUNTER (OUTPATIENT)
Age: 83
Setting detail: SPECIMEN
Discharge: HOME | End: 2023-03-23

## 2023-03-23 ENCOUNTER — HOSPITAL ENCOUNTER (OUTPATIENT)
Age: 83
Discharge: HOME | End: 2023-03-25
Payer: MEDICARE

## 2023-03-23 ENCOUNTER — OFFICE VISIT (OUTPATIENT)
Dept: FAMILY MEDICINE CLINIC | Age: 83
End: 2023-03-23

## 2023-03-23 ENCOUNTER — HOSPITAL ENCOUNTER (OUTPATIENT)
Dept: GENERAL RADIOLOGY | Age: 83
Discharge: HOME | End: 2023-03-25
Payer: MEDICARE

## 2023-03-23 VITALS
WEIGHT: 111.6 LBS | DIASTOLIC BLOOD PRESSURE: 80 MMHG | TEMPERATURE: 97.1 F | BODY MASS INDEX: 22.54 KG/M2 | HEART RATE: 77 BPM | OXYGEN SATURATION: 99 % | SYSTOLIC BLOOD PRESSURE: 124 MMHG

## 2023-03-23 DIAGNOSIS — Z09 HOSPITAL DISCHARGE FOLLOW-UP: ICD-10-CM

## 2023-03-23 DIAGNOSIS — M80.00XS AGE-RELATED OSTEOPOROSIS WITH CURRENT PATHOLOGICAL FRACTURE, SEQUELA: Primary | ICD-10-CM

## 2023-03-23 DIAGNOSIS — S22.080S COMPRESSION FRACTURE OF T12 VERTEBRA, SEQUELA: ICD-10-CM

## 2023-03-23 DIAGNOSIS — B17.10 ACUTE HEPATITIS C VIRUS INFECTION WITHOUT HEPATIC COMA: ICD-10-CM

## 2023-03-23 DIAGNOSIS — S32.040S COMPRESSION FRACTURE OF L4 VERTEBRA, SEQUELA: ICD-10-CM

## 2023-03-23 DIAGNOSIS — S22.079S CLOSED FRACTURE OF TENTH THORACIC VERTEBRA, UNSPECIFIED FRACTURE MORPHOLOGY, SEQUELA: ICD-10-CM

## 2023-03-23 DIAGNOSIS — R77.8 ELEVATED TROPONIN: ICD-10-CM

## 2023-03-23 DIAGNOSIS — R73.09 ELEVATED GLUCOSE: ICD-10-CM

## 2023-03-23 DIAGNOSIS — I10 PRIMARY HYPERTENSION: ICD-10-CM

## 2023-03-23 DIAGNOSIS — M54.9 BACK PAIN, UNSPECIFIED BACK LOCATION, UNSPECIFIED BACK PAIN LATERALITY, UNSPECIFIED CHRONICITY: ICD-10-CM

## 2023-03-23 LAB
ABSOLUTE EOS #: 0.17 K/UL (ref 0–0.44)
ABSOLUTE IMMATURE GRANULOCYTE: <0.03 K/UL (ref 0–0.3)
ABSOLUTE LYMPH #: 1.23 K/UL (ref 1.1–3.7)
ABSOLUTE MONO #: 0.45 K/UL (ref 0.1–1.2)
ALBUMIN SERPL-MCNC: 3.6 G/DL (ref 3.5–5.2)
ALBUMIN/GLOBULIN RATIO: 1.2 (ref 1–2.5)
ALP SERPL-CCNC: 152 U/L (ref 35–104)
ALT SERPL-CCNC: 18 U/L (ref 5–33)
ANION GAP SERPL CALCULATED.3IONS-SCNC: 11 MMOL/L (ref 9–17)
AST SERPL-CCNC: 32 U/L
BASOPHILS # BLD: 1 % (ref 0–2)
BASOPHILS ABSOLUTE: 0.03 K/UL (ref 0–0.2)
BILIRUB SERPL-MCNC: 0.5 MG/DL (ref 0.3–1.2)
BUN SERPL-MCNC: 6 MG/DL (ref 8–23)
CALCIUM SERPL-MCNC: 9 MG/DL (ref 8.6–10.4)
CHLORIDE SERPL-SCNC: 104 MMOL/L (ref 98–107)
CO2 SERPL-SCNC: 26 MMOL/L (ref 20–31)
CREAT SERPL-MCNC: 0.61 MG/DL (ref 0.5–0.9)
EOSINOPHILS RELATIVE PERCENT: 4 % (ref 1–4)
GFR SERPL CREATININE-BSD FRML MDRD: >60 ML/MIN/1.73M2
GLUCOSE SERPL-MCNC: 80 MG/DL (ref 70–99)
HCT VFR BLD AUTO: 33.4 % (ref 36.3–47.1)
HGB BLD-MCNC: 10.6 G/DL (ref 11.9–15.1)
IMMATURE GRANULOCYTES: 0 %
LYMPHOCYTES # BLD: 30 % (ref 24–43)
MAGNESIUM SERPL-MCNC: 2 MG/DL (ref 1.6–2.6)
MCH RBC QN AUTO: 32.9 PG (ref 25.2–33.5)
MCHC RBC AUTO-ENTMCNC: 31.7 G/DL (ref 28.4–34.8)
MCV RBC AUTO: 103.7 FL (ref 82.6–102.9)
MONOCYTES # BLD: 11 % (ref 3–12)
NRBC AUTOMATED: 0 PER 100 WBC
PDW BLD-RTO: 12.5 % (ref 11.8–14.4)
PLATELET # BLD AUTO: 154 K/UL (ref 138–453)
PMV BLD AUTO: 10.9 FL (ref 8.1–13.5)
POTASSIUM SERPL-SCNC: 4 MMOL/L (ref 3.7–5.3)
PROT SERPL-MCNC: 6.6 G/DL (ref 6.4–8.3)
RBC # BLD: 3.22 M/UL (ref 3.95–5.11)
RBC # BLD: ABNORMAL 10*6/UL
SEG NEUTROPHILS: 54 % (ref 36–65)
SEGMENTED NEUTROPHILS ABSOLUTE COUNT: 2.15 K/UL (ref 1.5–8.1)
SODIUM SERPL-SCNC: 141 MMOL/L (ref 135–144)
WBC # BLD AUTO: 4 K/UL (ref 3.5–11.3)

## 2023-03-23 PROCEDURE — 72072 X-RAY EXAM THORAC SPINE 3VWS: CPT

## 2023-03-23 PROCEDURE — 72100 X-RAY EXAM L-S SPINE 2/3 VWS: CPT

## 2023-03-23 SDOH — ECONOMIC STABILITY: FOOD INSECURITY: WITHIN THE PAST 12 MONTHS, YOU WORRIED THAT YOUR FOOD WOULD RUN OUT BEFORE YOU GOT MONEY TO BUY MORE.: NEVER TRUE

## 2023-03-23 SDOH — ECONOMIC STABILITY: INCOME INSECURITY: HOW HARD IS IT FOR YOU TO PAY FOR THE VERY BASICS LIKE FOOD, HOUSING, MEDICAL CARE, AND HEATING?: NOT HARD AT ALL

## 2023-03-23 SDOH — ECONOMIC STABILITY: HOUSING INSECURITY
IN THE LAST 12 MONTHS, WAS THERE A TIME WHEN YOU DID NOT HAVE A STEADY PLACE TO SLEEP OR SLEPT IN A SHELTER (INCLUDING NOW)?: NO

## 2023-03-23 SDOH — ECONOMIC STABILITY: FOOD INSECURITY: WITHIN THE PAST 12 MONTHS, THE FOOD YOU BOUGHT JUST DIDN'T LAST AND YOU DIDN'T HAVE MONEY TO GET MORE.: NEVER TRUE

## 2023-03-23 ASSESSMENT — PATIENT HEALTH QUESTIONNAIRE - PHQ9
SUM OF ALL RESPONSES TO PHQ QUESTIONS 1-9: 8
SUM OF ALL RESPONSES TO PHQ9 QUESTIONS 1 & 2: 3
SUM OF ALL RESPONSES TO PHQ QUESTIONS 1-9: 8
10. IF YOU CHECKED OFF ANY PROBLEMS, HOW DIFFICULT HAVE THESE PROBLEMS MADE IT FOR YOU TO DO YOUR WORK, TAKE CARE OF THINGS AT HOME, OR GET ALONG WITH OTHER PEOPLE: 1
7. TROUBLE CONCENTRATING ON THINGS, SUCH AS READING THE NEWSPAPER OR WATCHING TELEVISION: 1
3. TROUBLE FALLING OR STAYING ASLEEP: 3
9. THOUGHTS THAT YOU WOULD BE BETTER OFF DEAD, OR OF HURTING YOURSELF: 0
SUM OF ALL RESPONSES TO PHQ QUESTIONS 1-9: 8
1. LITTLE INTEREST OR PLEASURE IN DOING THINGS: 1
8. MOVING OR SPEAKING SO SLOWLY THAT OTHER PEOPLE COULD HAVE NOTICED. OR THE OPPOSITE, BEING SO FIGETY OR RESTLESS THAT YOU HAVE BEEN MOVING AROUND A LOT MORE THAN USUAL: 0
6. FEELING BAD ABOUT YOURSELF - OR THAT YOU ARE A FAILURE OR HAVE LET YOURSELF OR YOUR FAMILY DOWN: 0
4. FEELING TIRED OR HAVING LITTLE ENERGY: 1
5. POOR APPETITE OR OVEREATING: 0
2. FEELING DOWN, DEPRESSED OR HOPELESS: 2
SUM OF ALL RESPONSES TO PHQ QUESTIONS 1-9: 8

## 2023-03-23 ASSESSMENT — ENCOUNTER SYMPTOMS
EYES NEGATIVE: 1
ALLERGIC/IMMUNOLOGIC NEGATIVE: 1
SHORTNESS OF BREATH: 0
BACK PAIN: 1
COUGH: 0
CHEST TIGHTNESS: 0
WHEEZING: 0
DIARRHEA: 0
VOMITING: 0
COLOR CHANGE: 0
CONSTIPATION: 0
RESPIRATORY NEGATIVE: 1
GASTROINTESTINAL NEGATIVE: 1
NAUSEA: 0

## 2023-03-23 NOTE — PATIENT INSTRUCTIONS
WHO FRAX risk assessment tool          Alendronate = Fosamax  Risedronate = Actonel  Zoledronic acid = Reclast (IV infusion yearly)  Ibandronate = Boniva    Denosumab = Prolia (SQ injection every 6 months)    Teriparatide = Forteo (SQ injection daily for 2 years only)    Raloxifene = Evista    Information sourced from [address] [facility]:  Supplemental Calcium or Vitamin D Doesnt Lower Fracture Risk in Community-Dwelling Adults  Guidelines that recommend supplementation for older adults living in institutions should not be applied to adults living independently. Several guidelines recommend calcium, vitamin D, or both types of supplementation to prevent fractures in older adults (focusing mostly on frail elders and those living in institutions; [place] Int 2010; 63:7373). In this meta-analysis, researchers identified 33 randomized controlled trials that involved community-dwelling adults (age, ?48; total patients, 51,145). The trials comprised 44 separate studies (14 of calcium supplementation alone, 17 of vitamin D supplementation alone, and 13 of combined supplementation). Studies that involved patients with corticosteroid-induced osteoporosis, and those in which other drugs or dietary interventions were combined with vitamin D or calcium supplementation were excluded. The pooled analyses for all three study clusters showed no benefit for any supplementation intervention in reducing relative risk for hip fracture (the primary outcome). Secondary outcomes (vertebral, nonvertebral, and total fractures) also were not affected by any of the interventions. The overall results applied to subgroups with 25-hydroxyvitamin D levels lower than 20 ng/mL or daily dietary calcium intake lower than 900 mg, to women, and to people with previous fractures.   COMMENT  A colleague of [facility] recently saw a healthy 31-year-old woman who was persistent in her desire for a vitamin D test. When the result came back at 13 ng/mL, she

## 2023-03-23 NOTE — PROGRESS NOTES
Nose: Nose normal.   Eyes:      General: No scleral icterus. Right eye: No discharge. Left eye: No discharge. Conjunctiva/sclera: Conjunctivae normal.      Pupils: Pupils are equal, round, and reactive to light. Neck:      Trachea: No tracheal deviation. Cardiovascular:      Rate and Rhythm: Normal rate and regular rhythm. Heart sounds: Normal heart sounds. No murmur heard. No friction rub. No gallop. Pulmonary:      Effort: Pulmonary effort is normal. No tachypnea, accessory muscle usage or respiratory distress. Breath sounds: Normal breath sounds. No stridor. No decreased breath sounds, wheezing, rhonchi or rales. Abdominal:      Palpations: Abdomen is soft. Musculoskeletal:         General: No tenderness or deformity. Normal range of motion. Cervical back: Normal range of motion and neck supple. Skin:     General: Skin is warm and dry. Coloration: Skin is not pale. Findings: No erythema or rash. Neurological:      Mental Status: She is alert and oriented to person, place, and time. GCS: GCS eye subscore is 4. GCS verbal subscore is 5. GCS motor subscore is 6. Motor: Tremor present. Gait: Gait abnormal.   Psychiatric:         Speech: Speech normal.         Behavior: Behavior normal.         Thought Content: Thought content normal.         Judgment: Judgment normal.         Assessment:         1. Age-related osteoporosis with current pathological fracture, sequela    2. Primary hypertension    3. Compression fracture of L4 vertebra, sequela    4. Compression fracture of T12 vertebra, sequela    5. Closed fracture of tenth thoracic vertebra, unspecified fracture morphology, sequela    6. Acute hepatitis C virus infection without hepatic coma    7. Elevated troponin    8. Elevated glucose    9.  Hospital discharge follow-up        Plan:     1. Age-related osteoporosis with current pathological fracture, sequela    I recommended [facility] ,

## 2023-03-24 DIAGNOSIS — D64.9 ANEMIA, UNSPECIFIED TYPE: Primary | ICD-10-CM

## 2023-03-24 LAB
EST. AVERAGE GLUCOSE BLD GHB EST-MCNC: 85 MG/DL
HBA1C MFR BLD: 4.6 % (ref 4–6)

## 2023-04-22 PROBLEM — R79.89 ELEVATED TROPONIN: Status: RESOLVED | Noted: 2023-02-18 | Resolved: 2023-04-22

## 2023-04-22 PROBLEM — R77.8 ELEVATED TROPONIN: Status: RESOLVED | Noted: 2023-02-18 | Resolved: 2023-04-22

## 2023-06-05 RX ORDER — LISINOPRIL 40 MG/1
TABLET ORAL
Qty: 90 TABLET | Refills: 2 | Status: SHIPPED | OUTPATIENT
Start: 2023-06-05

## 2023-06-05 NOTE — TELEPHONE ENCOUNTER
Heidi Jamil is calling to request a refill on the following medication(s):    Medication Request:  Requested Prescriptions     Pending Prescriptions Disp Refills    lisinopril (PRINIVIL;ZESTRIL) 40 MG tablet [Pharmacy Med Name: LISINOPRIL 40 MG TABLET] 90 tablet      Sig: TAKE ONE TABLET BY MOUTH EVERY MORNING       Last Visit Date (If Applicable):  7/13/1654    Next Visit Date:    6/23/2023

## 2023-06-23 ENCOUNTER — HOSPITAL ENCOUNTER (OUTPATIENT)
Age: 83
Setting detail: SPECIMEN
Discharge: HOME | End: 2023-06-23

## 2023-06-23 ENCOUNTER — OFFICE VISIT (OUTPATIENT)
Dept: FAMILY MEDICINE CLINIC | Age: 83
End: 2023-06-23
Payer: MEDICARE

## 2023-06-23 VITALS
TEMPERATURE: 97.1 F | SYSTOLIC BLOOD PRESSURE: 148 MMHG | HEART RATE: 98 BPM | BODY MASS INDEX: 20.4 KG/M2 | DIASTOLIC BLOOD PRESSURE: 96 MMHG | OXYGEN SATURATION: 98 % | WEIGHT: 101 LBS

## 2023-06-23 DIAGNOSIS — M80.00XD AGE-RELATED OSTEOPOROSIS WITH CURRENT PATHOLOGICAL FRACTURE WITH ROUTINE HEALING, SUBSEQUENT ENCOUNTER: ICD-10-CM

## 2023-06-23 DIAGNOSIS — F33.1 MODERATE EPISODE OF RECURRENT MAJOR DEPRESSIVE DISORDER (HCC): ICD-10-CM

## 2023-06-23 DIAGNOSIS — B18.2 CHRONIC HEPATITIS C WITHOUT HEPATIC COMA (HCC): ICD-10-CM

## 2023-06-23 DIAGNOSIS — R63.4 WEIGHT LOSS: ICD-10-CM

## 2023-06-23 DIAGNOSIS — I10 PRIMARY HYPERTENSION: ICD-10-CM

## 2023-06-23 DIAGNOSIS — K21.9 GASTROESOPHAGEAL REFLUX DISEASE WITHOUT ESOPHAGITIS: ICD-10-CM

## 2023-06-23 DIAGNOSIS — I10 PRIMARY HYPERTENSION: Primary | ICD-10-CM

## 2023-06-23 DIAGNOSIS — D64.9 ANEMIA, UNSPECIFIED TYPE: ICD-10-CM

## 2023-06-23 DIAGNOSIS — R14.0 ABDOMINAL BLOATING: ICD-10-CM

## 2023-06-23 LAB
ALBUMIN SERPL-MCNC: 3.9 G/DL (ref 3.5–5.2)
ALBUMIN/GLOB SERPL: 1.1 {RATIO} (ref 1–2.5)
ALP SERPL-CCNC: 134 U/L (ref 35–104)
ALT SERPL-CCNC: 50 U/L (ref 5–33)
ANION GAP SERPL CALCULATED.3IONS-SCNC: 12 MMOL/L (ref 9–17)
AST SERPL-CCNC: 75 U/L
BASOPHILS # BLD: 0.04 K/UL (ref 0–0.2)
BASOPHILS NFR BLD: 1 % (ref 0–2)
BILIRUB SERPL-MCNC: 0.7 MG/DL (ref 0.3–1.2)
BUN SERPL-MCNC: 11 MG/DL (ref 8–23)
CALCIUM SERPL-MCNC: 9.4 MG/DL (ref 8.6–10.4)
CHLORIDE SERPL-SCNC: 106 MMOL/L (ref 98–107)
CO2 SERPL-SCNC: 24 MMOL/L (ref 20–31)
CREAT SERPL-MCNC: 0.73 MG/DL (ref 0.5–0.9)
EOSINOPHIL # BLD: 0.28 K/UL (ref 0–0.44)
EOSINOPHILS RELATIVE PERCENT: 6 % (ref 1–4)
ERYTHROCYTE [DISTWIDTH] IN BLOOD BY AUTOMATED COUNT: 12.8 % (ref 11.8–14.4)
FOLATE SERPL-MCNC: >20 NG/ML
GFR SERPL CREATININE-BSD FRML MDRD: >60 ML/MIN/1.73M2
GLUCOSE SERPL-MCNC: 131 MG/DL (ref 70–99)
HCT VFR BLD AUTO: 38.5 % (ref 36.3–47.1)
HGB BLD-MCNC: 12.5 G/DL (ref 11.9–15.1)
IMM GRANULOCYTES # BLD AUTO: <0.03 K/UL (ref 0–0.3)
IMM GRANULOCYTES NFR BLD: 0 %
IRON SATN MFR SERPL: 70 % (ref 20–55)
IRON SERPL-MCNC: 158 UG/DL (ref 37–145)
LYMPHOCYTES # BLD: 34 % (ref 24–43)
LYMPHOCYTES NFR BLD: 1.54 K/UL (ref 1.1–3.7)
MCH RBC QN AUTO: 32.7 PG (ref 25.2–33.5)
MCHC RBC AUTO-ENTMCNC: 32.5 G/DL (ref 28.4–34.8)
MCV RBC AUTO: 100.8 FL (ref 82.6–102.9)
MONOCYTES NFR BLD: 0.36 K/UL (ref 0.1–1.2)
MONOCYTES NFR BLD: 8 % (ref 3–12)
NEUTROPHILS NFR BLD: 51 % (ref 36–65)
NEUTS SEG NFR BLD: 2.28 K/UL (ref 1.5–8.1)
NRBC BLD-RTO: 0 PER 100 WBC
PLATELET # BLD AUTO: 170 K/UL (ref 138–453)
PMV BLD AUTO: 10.5 FL (ref 8.1–13.5)
POTASSIUM SERPL-SCNC: 3.7 MMOL/L (ref 3.7–5.3)
PROT SERPL-MCNC: 7.4 G/DL (ref 6.4–8.3)
RBC # BLD AUTO: 3.82 M/UL (ref 3.95–5.11)
SODIUM SERPL-SCNC: 142 MMOL/L (ref 135–144)
TIBC SERPL-MCNC: 225 UG/DL (ref 250–450)
TSH SERPL DL<=0.05 MIU/L-ACNC: 1.46 UIU/ML (ref 0.3–5)
UNSATURATED IRON BINDING CAPACITY: 67 UG/DL (ref 112–347)
VIT B12 SERPL-MCNC: 582 PG/ML (ref 232–1245)
WBC OTHER # BLD: 4.5 K/UL (ref 3.5–11.3)

## 2023-06-23 PROCEDURE — 3080F DIAST BP >= 90 MM HG: CPT | Performed by: NURSE PRACTITIONER

## 2023-06-23 PROCEDURE — G8399 PT W/DXA RESULTS DOCUMENT: HCPCS | Performed by: NURSE PRACTITIONER

## 2023-06-23 PROCEDURE — 1036F TOBACCO NON-USER: CPT | Performed by: NURSE PRACTITIONER

## 2023-06-23 PROCEDURE — 3077F SYST BP >= 140 MM HG: CPT | Performed by: NURSE PRACTITIONER

## 2023-06-23 PROCEDURE — G8420 CALC BMI NORM PARAMETERS: HCPCS | Performed by: NURSE PRACTITIONER

## 2023-06-23 PROCEDURE — 99214 OFFICE O/P EST MOD 30 MIN: CPT | Performed by: NURSE PRACTITIONER

## 2023-06-23 PROCEDURE — 1123F ACP DISCUSS/DSCN MKR DOCD: CPT | Performed by: NURSE PRACTITIONER

## 2023-06-23 PROCEDURE — G8428 CUR MEDS NOT DOCUMENT: HCPCS | Performed by: NURSE PRACTITIONER

## 2023-06-23 PROCEDURE — 1090F PRES/ABSN URINE INCON ASSESS: CPT | Performed by: NURSE PRACTITIONER

## 2023-06-23 RX ORDER — AMLODIPINE BESYLATE 5 MG/1
5 TABLET ORAL DAILY
Qty: 90 TABLET | Refills: 2 | Status: SHIPPED | OUTPATIENT
Start: 2023-06-23

## 2023-06-23 RX ORDER — VENLAFAXINE HYDROCHLORIDE 150 MG/1
150 CAPSULE, EXTENDED RELEASE ORAL DAILY
Qty: 90 CAPSULE | Refills: 2 | Status: SHIPPED | OUTPATIENT
Start: 2023-06-23

## 2023-06-23 ASSESSMENT — ENCOUNTER SYMPTOMS
ABDOMINAL PAIN: 0
CONSTIPATION: 0
COLOR CHANGE: 0
ALLERGIC/IMMUNOLOGIC NEGATIVE: 1
CHEST TIGHTNESS: 0
ABDOMINAL DISTENTION: 1
RESPIRATORY NEGATIVE: 1
DIARRHEA: 0
NAUSEA: 0
COUGH: 0
EYES NEGATIVE: 1
VOMITING: 0
SHORTNESS OF BREATH: 0

## 2023-06-23 ASSESSMENT — COLUMBIA-SUICIDE SEVERITY RATING SCALE - C-SSRS
6. HAVE YOU EVER DONE ANYTHING, STARTED TO DO ANYTHING, OR PREPARED TO DO ANYTHING TO END YOUR LIFE?: NO
2. HAVE YOU ACTUALLY HAD ANY THOUGHTS OF KILLING YOURSELF?: NO
1. WITHIN THE PAST MONTH, HAVE YOU WISHED YOU WERE DEAD OR WISHED YOU COULD GO TO SLEEP AND NOT WAKE UP?: YES

## 2023-06-23 ASSESSMENT — PATIENT HEALTH QUESTIONNAIRE - PHQ9
5. POOR APPETITE OR OVEREATING: 3
2. FEELING DOWN, DEPRESSED OR HOPELESS: 3
SUM OF ALL RESPONSES TO PHQ QUESTIONS 1-9: 17
SUM OF ALL RESPONSES TO PHQ QUESTIONS 1-9: 17
6. FEELING BAD ABOUT YOURSELF - OR THAT YOU ARE A FAILURE OR HAVE LET YOURSELF OR YOUR FAMILY DOWN: 3
10. IF YOU CHECKED OFF ANY PROBLEMS, HOW DIFFICULT HAVE THESE PROBLEMS MADE IT FOR YOU TO DO YOUR WORK, TAKE CARE OF THINGS AT HOME, OR GET ALONG WITH OTHER PEOPLE: 0
3. TROUBLE FALLING OR STAYING ASLEEP: 3
4. FEELING TIRED OR HAVING LITTLE ENERGY: 0
1. LITTLE INTEREST OR PLEASURE IN DOING THINGS: 1
8. MOVING OR SPEAKING SO SLOWLY THAT OTHER PEOPLE COULD HAVE NOTICED. OR THE OPPOSITE, BEING SO FIGETY OR RESTLESS THAT YOU HAVE BEEN MOVING AROUND A LOT MORE THAN USUAL: 1
7. TROUBLE CONCENTRATING ON THINGS, SUCH AS READING THE NEWSPAPER OR WATCHING TELEVISION: 3
SUM OF ALL RESPONSES TO PHQ QUESTIONS 1-9: 17
SUM OF ALL RESPONSES TO PHQ QUESTIONS 1-9: 17
9. THOUGHTS THAT YOU WOULD BE BETTER OFF DEAD, OR OF HURTING YOURSELF: 0
SUM OF ALL RESPONSES TO PHQ9 QUESTIONS 1 & 2: 4

## 2023-06-23 NOTE — PROGRESS NOTES
[facility]  [address]  Dept: [phone]    Daily Sharp is a 81 y.o. female who presents today for her medical conditions/complaints as noted below.       Daily Sharp is here today c/o Hypertension    Past Medical History:   Diagnosis Date    Acute hepatitis C virus infection without hepatic coma 2022    Anxiety and depression     Colitis     w/ inpatient colonscopy polyp removal    GERD (gastroesophageal reflux disease)     HTN (hypertension) 2014    Restless legs 2020      Past Surgical History:   Procedure Laterality Date    APPENDECTOMY      HYSTERECTOMY (CERVIX STATUS UNKNOWN)      KYPHOSIS SURGERY  2023    KYPHOPLASTY T-10, L1, L2    SPINAL FUSION      SPINE SURGERY N/A 2023    KYPHOPLASTY T-10, L1, L2 performed by Anastasiia Philip MD at [place] History   Problem Relation Age of Onset    Heart Disease Father        Social History     Tobacco Use    Smoking status: Former     Packs/day: 0.25     Years: 30.00     Pack years: 7.50     Types: Cigarettes     Quit date: 1980     Years since quittin.4    Smokeless tobacco: Never   Substance Use Topics    Alcohol use: Yes     Comment: socially       Current Outpatient Medications   Medication Sig Dispense Refill    lisinopril (PRINIVIL;ZESTRIL) 40 MG tablet TAKE ONE TABLET BY MOUTH EVERY MORNING 90 tablet 2    docusate sodium (COLACE, DULCOLAX) 100 MG CAPS Take 100 mg by mouth daily as needed for Constipation 30 capsule 0    vitamin D3 (CHOLECALCIFEROL) 10 MCG (400 UNIT) TABS tablet Take 1 tablet by mouth daily 30 tablet 0    Baclofen (LIORESAL) 5 MG tablet Take 1 tablet by mouth 3 times daily as needed (muscle spasms) 30 tablet 0    venlafaxine (EFFEXOR XR) 37.5 MG extended release capsule TAKE ONE CAPSULE BY MOUTH TWICE A  capsule 1    omeprazole (PRILOSEC) 40 MG delayed release capsule Take 1 capsule by mouth Daily 90 capsule 3    Multiple

## 2023-06-30 DIAGNOSIS — K21.9 GASTROESOPHAGEAL REFLUX DISEASE WITHOUT ESOPHAGITIS: ICD-10-CM

## 2023-06-30 RX ORDER — OMEPRAZOLE 40 MG/1
CAPSULE, DELAYED RELEASE ORAL
Qty: 90 CAPSULE | Refills: 3 | Status: SHIPPED | OUTPATIENT
Start: 2023-06-30

## 2023-07-07 ENCOUNTER — HOSPITAL ENCOUNTER (OUTPATIENT)
Age: 83
Discharge: HOME | End: 2023-07-07
Payer: MEDICARE

## 2023-07-07 LAB
AFP SERPL-MCNC: 4.7 UG/L
ALBUMIN SERPL-MCNC: 3.9 G/DL (ref 3.5–5.2)
ALBUMIN/GLOB SERPL: 1.3 {RATIO} (ref 1–2.5)
ALP SERPL-CCNC: 121 U/L (ref 35–104)
ALT SERPL-CCNC: 51 U/L (ref 5–33)
AST SERPL-CCNC: 65 U/L
BILIRUB DIRECT SERPL-MCNC: 0.2 MG/DL
BILIRUB INDIRECT SERPL-MCNC: 0.3 MG/DL (ref 0–1)
BILIRUB SERPL-MCNC: 0.5 MG/DL (ref 0.3–1.2)
ERYTHROCYTE [DISTWIDTH] IN BLOOD BY AUTOMATED COUNT: 12.6 % (ref 11.8–14.4)
HAV AB SERPL IA-ACNC: REACTIVE
HAV IGM SERPL QL IA: NONREACTIVE
HBV CORE AB SER QL: NONREACTIVE
HBV CORE IGM SERPL QL IA: NONREACTIVE
HBV SURFACE AG SERPL QL IA: NONREACTIVE
HCT VFR BLD AUTO: 35 % (ref 36.3–47.1)
HCV AB SERPL QL IA: REACTIVE
HGB BLD-MCNC: 11.6 G/DL (ref 11.9–15.1)
INR PPP: 1.1
MCH RBC QN AUTO: 32.8 PG (ref 25.2–33.5)
MCHC RBC AUTO-ENTMCNC: 33.1 G/DL (ref 28.4–34.8)
MCV RBC AUTO: 98.9 FL (ref 82.6–102.9)
NRBC BLD-RTO: 0 PER 100 WBC
PLATELET # BLD AUTO: 151 K/UL (ref 138–453)
PMV BLD AUTO: 10.1 FL (ref 8.1–13.5)
PROT SERPL-MCNC: 6.9 G/DL (ref 6.4–8.3)
PROTHROMBIN TIME: 13.8 SEC (ref 11.5–14.2)
RBC # BLD AUTO: 3.54 M/UL (ref 3.95–5.11)
WBC OTHER # BLD: 4.1 K/UL (ref 3.5–11.3)

## 2023-07-07 PROCEDURE — 86708 HEPATITIS A ANTIBODY: CPT

## 2023-07-07 PROCEDURE — 85610 PROTHROMBIN TIME: CPT

## 2023-07-07 PROCEDURE — 87902 NFCT AGT GNTYP ALYS HEP C: CPT

## 2023-07-07 PROCEDURE — 36415 COLL VENOUS BLD VENIPUNCTURE: CPT

## 2023-07-07 PROCEDURE — 82105 ALPHA-FETOPROTEIN SERUM: CPT

## 2023-07-07 PROCEDURE — 80074 ACUTE HEPATITIS PANEL: CPT

## 2023-07-07 PROCEDURE — 84460 ALANINE AMINO (ALT) (SGPT): CPT

## 2023-07-07 PROCEDURE — 87522 HEPATITIS C REVRS TRNSCRPJ: CPT

## 2023-07-07 PROCEDURE — 86317 IMMUNOASSAY INFECTIOUS AGENT: CPT

## 2023-07-07 PROCEDURE — 84520 ASSAY OF UREA NITROGEN: CPT

## 2023-07-07 PROCEDURE — 85027 COMPLETE CBC AUTOMATED: CPT

## 2023-07-07 PROCEDURE — 86704 HEP B CORE ANTIBODY TOTAL: CPT

## 2023-07-07 PROCEDURE — 84450 TRANSFERASE (AST) (SGOT): CPT

## 2023-07-07 PROCEDURE — 80076 HEPATIC FUNCTION PANEL: CPT

## 2023-07-07 PROCEDURE — 82977 ASSAY OF GGT: CPT

## 2023-07-07 PROCEDURE — 83883 ASSAY NEPHELOMETRY NOT SPEC: CPT

## 2023-07-08 LAB — HBV SURFACE AB SERPL IA-ACNC: 6.51 MIU/ML

## 2023-07-11 LAB
ALANINE AMINOTRANSFERASE, FIBROMETER: 60 U/L (ref 5–40)
ALPHA-2-MACROGLOBULIN, FIBROMETER: 301 MG/DL (ref 131–293)
ASPARTATE AMINOTRANSFERASE, FIBROMETER: 66 U/L (ref 9–40)
CIRRHOMETER PATIENT SCORE: 0.44
EER FIBROMETER REPORT: ABNORMAL
FIBROMETER INTERPRETATION: ABNORMAL
FIBROMETER PATIENT SCORE: 0.91
FIBROMETER PLATELET COUNT: 148
FIBROMETER PROTHROMBIN INDEX: 90 % (ref 90–120)
FIBROSIS METAVIR CLASSIFICATION: ABNORMAL
GAMMA GLUTAMYL TRANSFERASE, FIBROMETER: 59 U/L (ref 7–33)
INFLAMETER METAVIR CLASSIFICATION: ABNORMAL
INFLAMETER PATIENT SCORE: 0.59
UREA NITROGEN, FIBROMETER: 8 MG/DL (ref 7–20)

## 2023-07-12 LAB
HCV GENTYP SERPL NAA+PROBE: NORMAL
HCV RNA # SERPL NAA+PROBE: DETECTED {COPIES}/ML
HCV RNA SERPL NAA+PROBE-ACNC: ABNORMAL IU/ML
HCV RNA SERPL NAA+PROBE-LOG IU: 6.42 LOG IU/ML
SPECIMEN SOURCE: ABNORMAL

## 2023-09-19 NOTE — PROGRESS NOTES
[facility]  Office: [address], DO, Citlalli Gonzalez, DO, Uli Chris, DO, Robert Molina, DO, Kristan Hidalgo MD, Daniella Meeks MD, Jaime Bond MD, Gabi Barth MD,  Mesha Rutledge MD, Matt Sethi MD, Adolfo Jeong, DO, Nerissa Haines MD,  Cynthia Aguilar MD, Susan Hurtado MD, Cyndy Leon, DO, Holly Lott MD, Rosetta Asencio MD, Shireen Zee DO, Jerrica Ulloa MD, Jalen Martel MD, Mohan Miller MD, Zuri Cherry MD, Ivone Youngblood, DO, Yesika Norton MD, Jhon Meraz MD, Idalia Khan, CNP,  Dov Posey, CNP, Phan Boone, CNP, Sabiha Rosen, CNP,  Paul Khan, DNP, Betsy Guevara, CNP, Adan Montenegro, CNP, Ael Mendoza, CNP, Jaylen Huertas, CNP, Brendan Fuller, CNP, Rudy Ruggiero PA-C, Dejon Haji, CNS, Clover Valdez, CNP, David Mcdonald, CNP         [address]    Progress Note    2023    12:23 PM    Name:   Macy Melgar  MRN:     [id number]     Acct:      [de-identified]   Room:   [facility]3/0103-01   Day:  3  Admit Date:  2023 11:26 AM    PCP:   PABLITO Mancia CNP  Code Status:  Full Code    Subjective:     C/C:   Chief Complaint   Patient presents with    Back Pain     Interval History Status: not changed. Patient complains of back pain which is 10 out of 10. She received baclofen this morning. She is on tramadol for pain. She says pain is relieved only for 3 to 4 hours. Brace was brought from home for her but she does not have it done. She has not started physical therapy yet. She is waiting for neurosurgery evaluation. OT has also recommended PMNR consult. Will await neurosurgery evaluation before putting in for PMNR. Brief History:     Patient was admitted thru ER with:  Dang Alvarado is a 81 y.o. female who presents with lower back pain for the past 2 weeks.   Patient's grandchildren and son are in the room and provide additional history as patient is hard of hearing. Patient and patient's children report that she had a spinal fusion with Dr. Jose Luis Reynolds at [facility] last October. Patient became involved with therapy and noticed an improvement in her back pain at that time. However for the past 2 weeks her  has been hospitalized and she had been sitting in a hard nonsupportive chair for several hours a day after which she experienced worsening upper back pain that has not been improved with physical therapy. Patient has been taking Motrin with minimal improvement in addition to tramadol which did not seem to help at all. Patient has a history of osteoporotic fracture without trauma in the past.  Patient denies any incontinence but her family reports that she has been avoiding using any laxatives for her normal constipation as it is painful for her to have to go to the restroom. Patient denies any numbness in the saddle region or in lower extremities. She reports that she feels like she is weak in her lower extremities because of the pain when she is walking. Her family reports that she is having difficulty transferring and getting around due to the pain. Patient normally uses a cane to ambulate. \"        Review of Systems:     Review of Systems   Respiratory:  Negative for cough, shortness of breath and wheezing. Cardiovascular:  Negative for chest pain, palpitations and leg swelling. Gastrointestinal:  Negative for abdominal pain and constipation. Genitourinary:  Negative for decreased urine volume. Musculoskeletal:  Positive for back pain. Negative for arthralgias. Neurological:  Negative for dizziness, weakness, numbness and headaches. Psychiatric/Behavioral:  Positive for sleep disturbance. The patient is nervous/anxious. Medications:      Allergies:  No Known Allergies    Current Meds:   Scheduled Meds:    docusate sodium  100 mg Oral Daily    polyethylene glycol  17 g Oral Daily    vitamin D3  400 Units Oral Daily    calcium carbonate  1,000 mg Oral Daily    amLODIPine  5 mg Oral Daily    mirtazapine  7.5 mg Oral Nightly    therapeutic multivitamin-minerals  1 tablet Oral Daily    lisinopril  40 mg Oral Daily    pantoprazole  40 mg Oral QAM AC    venlafaxine  37.5 mg Oral BID    sodium chloride flush  5-40 mL IntraVENous 2 times per day    enoxaparin  40 mg SubCUTAneous Daily     Continuous Infusions:    sodium chloride       PRN Meds: traMADol, Baclofen, sodium chloride flush, sodium chloride, potassium chloride **OR** potassium alternative oral replacement **OR** potassium chloride, ondansetron **OR** ondansetron, bisacodyl, acetaminophen **OR** acetaminophen, LORazepam    Data:     Past Medical History:   has a past medical history of Acute hepatitis C virus infection without hepatic coma, Anxiety and depression, Colitis, GERD (gastroesophageal reflux disease), HTN (hypertension), and Restless legs. Social History:   reports that she quit smoking about 43 years ago. Her smoking use included cigarettes. She has a 7.50 pack-year smoking history. She has never used smokeless tobacco. She reports current alcohol use. She reports that she does not use drugs. Family History:   Family History   Problem Relation Age of Onset    Heart Disease Father        Vitals:  BP (!) 152/80   Pulse 98   Temp 98 °F (36.7 °C) (Oral)   Resp 23   Wt 120 lb (54.4 kg)   SpO2 98%   BMI 24.24 kg/m²   Temp (24hrs), Av.4 °F (36.9 °C), Min:98 °F (36.7 °C), Max:99 °F (37.2 °C)    No results for input(s): POCGLU in the last 72 hours. I/O (24Hr):     Intake/Output Summary (Last 24 hours) at 2023 1223  Last data filed at 2023 1243  Gross per 24 hour   Intake 120 ml   Output --   Net 120 ml       Labs:  Hematology:  Recent Labs     23  1245 23  0105   WBC 4.1 5.7   RBC 3.30* 2.92*   HGB 11.0* 9.8*   HCT 31.8* 28.9*   MCV 96.4 99.0   MCH 33.3 33.6*   MCHC 34.6 33.9   RDW 12.8 12.7    154   MPV 9.9 9.6     Chemistry:  Recent Labs     02/18/23  1245 02/18/23  1419 02/19/23  0105 02/19/23  0929 02/19/23  1624 02/20/23  0414     --  139  --   --  138   K 2.8*  --  3.3*  --   --  3.9     --  104  --   --  104   CO2 27  --  27  --   --  27   GLUCOSE 121*  --  101*  --   --  96   BUN 5*  --  4*  --   --  6*   CREATININE 0.49*  --  0.46*  --   --  0.55   MG  --  1.8 1.7  --   --   --    ANIONGAP 9  --  8*  --   --  7*   LABGLOM >60  --  >60  --   --  >60   CALCIUM 8.6  --  8.5*  --   --  8.6   TROPHS 19* 19* 20* 21* 22*  --      Recent Labs     02/18/23  1245   PROT 6.1*   LABALBU 3.5   AST 30   ALT 15   ALKPHOS 151*   BILITOT 0.7     ABG:No results found for: POCPH, PHART, PH, POCPCO2, UCX9IAC, PCO2, POCPO2, PO2ART, PO2, POCHCO3, JKU2ZMF, HCO3, NBEA, PBEA, BEART, BE, THGBART, THB, YKI6UPP, QIEW7GDA, D5DTTJGH, O2SAT, FIO2  Lab Results   Component Value Date/Time    SPECIAL NOT REPORTED 02/08/2021 09:45 AM     Lab Results   Component Value Date/Time    CULTURE NO SIGNIFICANT GROWTH 07/07/2022 02:30 PM       Radiology:  CT THORACIC SPINE WO CONTRAST    Result Date: 2/18/2023  1. Global decreased bone mineral density. 2. Normal thoracic spine alignment with new/acute T10 insufficiency fracture with approximately 5% loss of height. 3. Normal lumbar spine alignment with stable diffuse degenerative changes. 4. Compared to the prior 2022, imaging there is progressive height loss at L1, with approximately 60% loss of height, L2, with approximately 10% loss of height, and L3, with approximately 15% loss of height. All of these are concerning for new/acute insufficiency fractures. 5. Moderate-severe L3/L4, canal stenosis related to degenerative changes with superimposed insufficiency fracture posterior cortical displacement. CT LUMBAR SPINE WO CONTRAST    Result Date: 2/18/2023  1. Global decreased bone mineral density.  2. Normal thoracic spine alignment with new/acute T10 insufficiency fracture with approximately 5% loss of height. 3. Normal lumbar spine alignment with stable diffuse degenerative changes. 4. Compared to the prior 2022, imaging there is progressive height loss at L1, with approximately 60% loss of height, L2, with approximately 10% loss of height, and L3, with approximately 15% loss of height. All of these are concerning for new/acute insufficiency fractures. 5. Moderate-severe L3/L4, canal stenosis related to degenerative changes with superimposed insufficiency fracture posterior cortical displacement. MRI THORACIC SPINE WO CONTRAST    Result Date: 2/18/2023  Multiple compression fractures as above. Fluid signal within the T10 vertebral body suggests it as being relatively acute is compared to the others. MRI LUMBAR SPINE WO CONTRAST    Result Date: 2/18/2023  Multiple compression fractures as above. Moderate disc marginal osteophyte and spinal stenosis at L1-2. Multilevel bilateral neural foraminal narrowing. XR LUMBAR SPINE 1 VW    Result Date: 2/19/2023  Thoracic spine: 1. Known compression deformity of T10, L1, L2, and L4. Vertebral plana at L1. Prior vertebroplasty at L3 and T12. 2. Mild degenerative changes within the remainder of the visualized thoracic spine. Upper thoracic vertebral bodies are obscured. 3. Kyphosis centered at T12. Lumbar spine: 1. Evidence of prior vertebroplasty at L3 and T12. Remote compression deformities of L4 and L2. Vertebral plana at L1. Known T10 compression deformity. 2. Underlying diffuse osteopenia. XR CHEST PORTABLE    Result Date: 2/18/2023  1. COPD. No evidence of acute congestive heart failure. 2. Indeterminate bilateral peripheral pulmonary opacities which could represent nodules and possible cavitary lesion versus subsegmental consolidation. When clinically able, CT thorax without contrast can be performed to further evaluate the findings. XR THORACIC SPINE 1 VW    Result Date: 2/19/2023  Thoracic spine: 1.  Known compression deformity of T10, L1, L2, and L4. Vertebral plana at L1. Prior vertebroplasty at L3 and T12. 2. Mild degenerative changes within the remainder of the visualized thoracic spine. Upper thoracic vertebral bodies are obscured. 3. Kyphosis centered at T12. Lumbar spine: 1. Evidence of prior vertebroplasty at L3 and T12. Remote compression deformities of L4 and L2. Vertebral plana at L1. Known T10 compression deformity. 2. Underlying diffuse osteopenia. Physical Examination:        Physical Exam  Vitals and nursing note reviewed. Constitutional:       Appearance: Normal appearance. HENT:      Head: Normocephalic and atraumatic. Eyes:      Extraocular Movements: Extraocular movements intact. Conjunctiva/sclera: Conjunctivae normal.      Pupils: Pupils are equal, round, and reactive to light. Cardiovascular:      Rate and Rhythm: Normal rate and regular rhythm. Pulmonary:      Effort: Pulmonary effort is normal.      Breath sounds: Normal breath sounds. Neurological:      General: No focal deficit present. Mental Status: She is alert and oriented to person, place, and time. Cranial Nerves: No cranial nerve deficit. Sensory: No sensory deficit. Motor: No weakness. Assessment:        Hospital Problems             Last Modified POA    * (Principal) Closed T10 spinal fracture ([address] 2/19/2023 Yes    Age-related osteoporosis with current pathological fracture 2/18/2023 Yes    Compression fx, lumbar spine, closed, initial encounter ([address] 2/18/2023 Yes    Spinal stenosis of lumbar region without neurogenic claudication 2/18/2023 Yes    Hypokalemia 2/18/2023 Yes    Troponin level elevated 2/18/2023 Yes    HTN (hypertension) 2/18/2023 Yes    Anemia, normocytic normochromic 2/18/2023 Yes        Plan:        Acute T 10 fracture- Multiple fractures noted on MRI in thoracic and lumbar spine. Brace from home obtained. PT. Continue Tramadol and Baclofen. Await NS recs. PMNR. IS  HTN- controlled on Norvasc, Lisinopril. Anxiety disorder- on Venlafaxine and Remeron for sleep. Osteoporosis- continue calcium/Vit d. Needs Dexa scan completed OP  Normocytic anemia- Follow up with GI op  Chronic hepatitis C- Normal LFT's. Liver US done last year.  Follow up with GI    DVT prophylaxis- On Lovenox    Timur Richardson MD  2/21/2023  12:23 PM No

## 2023-09-20 NOTE — ADDENDUM NOTE
Addended by: Aleja Velazquez on: 11/17/2020 01:06 PM     Modules accepted: Level of Service Airway    Performed by: Ginette Oates CRNA  Authorized by: Ginette Oates CRNA    Final Airway Type:  Endotracheal airway  Final Endotracheal Airway*:  ETT  ETT Size (mm)*:  7.0  Cuff*:  Regular  Technique Used for Successful ETT Placement:  Direct laryngoscopy  Devices/Methods Used in Placement*:  Mask and Oral Airway  Intubation Procedure*:  Preoxygenation, ETCO2, Atraumatic, Dentition Unchanged and Pharynx Clear  Insertion Site:  Oral  Blade Type*:  MAC  Blade Size*:  3  Cuff Volume (mL):  5  Measured from*:  Lips  Secured at (cm)*:  21  Placement Verified by: auscultation and capnometry    Glottic View*:  2 - partial view of glottis  Attempts*:  1   Patient Identified, Procedure confirmed, Emergency equipment available and Safety protocols followed  Location:  OR  Urgency:  Elective  Difficult Airway: No    Indications for Airway Management:  Anesthesia  Sedation Level:  Anesthetized  Mask Difficulty Assessment:  2 - vent by mask + OA or adjuvant +/- NMBA  Start Time: 9/20/2023 8:48 AM

## 2023-11-01 NOTE — TELEPHONE ENCOUNTER
Pt last seen 12/9/19. Notification Instructions: Patient will be notified of biopsy results. However, patient instructed to call the office if not contacted within 2 weeks.

## 2024-01-02 ENCOUNTER — HOSPITAL ENCOUNTER (OUTPATIENT)
Age: 84
Discharge: HOME | End: 2024-01-02
Payer: MEDICARE

## 2024-01-02 PROCEDURE — 36415 COLL VENOUS BLD VENIPUNCTURE: CPT

## 2024-01-02 PROCEDURE — 87522 HEPATITIS C REVRS TRNSCRPJ: CPT

## 2024-01-02 PROCEDURE — 80076 HEPATIC FUNCTION PANEL: CPT

## 2024-01-03 LAB
ALBUMIN SERPL-MCNC: 4.2 G/DL (ref 3.5–5.2)
ALBUMIN/GLOB SERPL: 1.6 {RATIO} (ref 1–2.5)
ALP SERPL-CCNC: 87 U/L (ref 35–104)
ALT SERPL-CCNC: 17 U/L (ref 5–33)
AST SERPL-CCNC: 31 U/L
BILIRUB DIRECT SERPL-MCNC: 0.1 MG/DL
BILIRUB INDIRECT SERPL-MCNC: 0.4 MG/DL (ref 0–1)
BILIRUB SERPL-MCNC: 0.5 MG/DL (ref 0.3–1.2)
PROT SERPL-MCNC: 6.8 G/DL (ref 6.4–8.3)

## 2024-01-04 LAB
HCV RNA # SERPL NAA+PROBE: NOT DETECTED {COPIES}/ML
SPECIMEN SOURCE: NORMAL

## 2024-02-15 NOTE — ED NOTES
Pt placed on 2L of O2 for SpO2 of 86%. Pt improved to 93%. Dr. Aria Stout and Dr. Panchito Bonds notified.         Lauren Diane RN  02/18/23 1077 No

## 2024-03-04 ENCOUNTER — OFFICE VISIT (OUTPATIENT)
Dept: FAMILY MEDICINE CLINIC | Age: 84
End: 2024-03-04
Payer: MEDICARE

## 2024-03-04 VITALS
SYSTOLIC BLOOD PRESSURE: 124 MMHG | HEART RATE: 101 BPM | TEMPERATURE: 99 F | OXYGEN SATURATION: 96 % | BODY MASS INDEX: 20.44 KG/M2 | WEIGHT: 101.2 LBS | DIASTOLIC BLOOD PRESSURE: 72 MMHG

## 2024-03-04 DIAGNOSIS — R73.09 ELEVATED GLUCOSE: ICD-10-CM

## 2024-03-04 DIAGNOSIS — I10 PRIMARY HYPERTENSION: Primary | ICD-10-CM

## 2024-03-04 DIAGNOSIS — Z13.31 POSITIVE DEPRESSION SCREENING: ICD-10-CM

## 2024-03-04 DIAGNOSIS — D64.9 ANEMIA, NORMOCYTIC NORMOCHROMIC: ICD-10-CM

## 2024-03-04 DIAGNOSIS — Z87.19 H/O CHRONIC HEPATITIS: ICD-10-CM

## 2024-03-04 DIAGNOSIS — F33.0 MILD EPISODE OF RECURRENT MAJOR DEPRESSIVE DISORDER (HCC): ICD-10-CM

## 2024-03-04 DIAGNOSIS — F41.9 ANXIETY: ICD-10-CM

## 2024-03-04 DIAGNOSIS — K21.9 GASTROESOPHAGEAL REFLUX DISEASE WITHOUT ESOPHAGITIS: ICD-10-CM

## 2024-03-04 PROCEDURE — 1123F ACP DISCUSS/DSCN MKR DOCD: CPT | Performed by: NURSE PRACTITIONER

## 2024-03-04 PROCEDURE — 99214 OFFICE O/P EST MOD 30 MIN: CPT | Performed by: NURSE PRACTITIONER

## 2024-03-04 PROCEDURE — 3078F DIAST BP <80 MM HG: CPT | Performed by: NURSE PRACTITIONER

## 2024-03-04 PROCEDURE — 3074F SYST BP LT 130 MM HG: CPT | Performed by: NURSE PRACTITIONER

## 2024-03-04 RX ORDER — HYDROXYZINE HYDROCHLORIDE 25 MG/1
12.5-25 TABLET, FILM COATED ORAL NIGHTLY PRN
Qty: 30 TABLET | Refills: 0 | Status: SHIPPED | OUTPATIENT
Start: 2024-03-04 | End: 2024-04-03

## 2024-03-04 ASSESSMENT — PATIENT HEALTH QUESTIONNAIRE - PHQ9
9. THOUGHTS THAT YOU WOULD BE BETTER OFF DEAD, OR OF HURTING YOURSELF: 0
SUM OF ALL RESPONSES TO PHQ QUESTIONS 1-9: 11
8. MOVING OR SPEAKING SO SLOWLY THAT OTHER PEOPLE COULD HAVE NOTICED. OR THE OPPOSITE, BEING SO FIGETY OR RESTLESS THAT YOU HAVE BEEN MOVING AROUND A LOT MORE THAN USUAL: 0
5. POOR APPETITE OR OVEREATING: 3
7. TROUBLE CONCENTRATING ON THINGS, SUCH AS READING THE NEWSPAPER OR WATCHING TELEVISION: 0
6. FEELING BAD ABOUT YOURSELF - OR THAT YOU ARE A FAILURE OR HAVE LET YOURSELF OR YOUR FAMILY DOWN: 0
SUM OF ALL RESPONSES TO PHQ QUESTIONS 1-9: 11
3. TROUBLE FALLING OR STAYING ASLEEP: 3
1. LITTLE INTEREST OR PLEASURE IN DOING THINGS: 1
SUM OF ALL RESPONSES TO PHQ9 QUESTIONS 1 & 2: 2
4. FEELING TIRED OR HAVING LITTLE ENERGY: 3
SUM OF ALL RESPONSES TO PHQ QUESTIONS 1-9: 11
SUM OF ALL RESPONSES TO PHQ QUESTIONS 1-9: 11
10. IF YOU CHECKED OFF ANY PROBLEMS, HOW DIFFICULT HAVE THESE PROBLEMS MADE IT FOR YOU TO DO YOUR WORK, TAKE CARE OF THINGS AT HOME, OR GET ALONG WITH OTHER PEOPLE: 1
2. FEELING DOWN, DEPRESSED OR HOPELESS: 1

## 2024-03-04 NOTE — PROGRESS NOTES
[facility]  [address] [place] [address]  Dept: [phone]    Lauri Mitchell is a 83 y.o. female who presents today for her medical conditions/complaintsas noted below.      Lauri Mitchell is here today c/o Hypertension    Past Medical History:   Diagnosis Date    Acute hepatitis C virus infection without hepatic coma 2022    Anxiety and depression     Colitis     w/ inpatient colonscopy polyp removal    GERD (gastroesophageal reflux disease)     HTN (hypertension) 2014    Restless legs 2020      Past Surgical History:   Procedure Laterality Date    APPENDECTOMY      HYSTERECTOMY (CERVIX STATUS UNKNOWN)      KYPHOSIS SURGERY  2023    KYPHOPLASTY T-10, L1, L2    SPINAL FUSION      SPINE SURGERY N/A 2023    KYPHOPLASTY T-10, L1, L2 performed by Kelvin De La Rosa MD at [facility] OR       Family History   Problem Relation Age of Onset    Heart Disease Father        Social History     Tobacco Use    Smoking status: Former     Current packs/day: 0.00     Average packs/day: 0.3 packs/day for 30.0 years (7.5 ttl pk-yrs)     Types: Cigarettes     Start date: 1950     Quit date: 1980     Years since quittin.1    Smokeless tobacco: Never   Substance Use Topics    Alcohol use: Yes     Comment: socially       Current Outpatient Medications   Medication Sig Dispense Refill    omeprazole (PRILOSEC) 40 MG delayed release capsule TAKE ONE CAPSULE BY MOUTH DAILY 90 capsule 3    venlafaxine (EFFEXOR XR) 150 MG extended release capsule Take 1 capsule by mouth daily 90 capsule 2    amLODIPine (NORVASC) 5 MG tablet Take 1 tablet by mouth daily 90 tablet 2    lisinopril (PRINIVIL;ZESTRIL) 40 MG tablet TAKE ONE TABLET BY MOUTH EVERY MORNING 90 tablet 2    vitamin D3 (CHOLECALCIFEROL) 10 MCG (400 UNIT) TABS tablet Take 1 tablet by mouth daily 30 tablet 0    Multiple Vitamins-Minerals (MULTIVITAMIN ADULT) CHEW Take 1 tablet by mouth daily 1 tablet 0     No current

## 2024-03-05 PROBLEM — B17.10 ACUTE HEPATITIS C VIRUS INFECTION WITHOUT HEPATIC COMA: Status: RESOLVED | Noted: 2022-07-05 | Resolved: 2024-03-05

## 2024-03-05 PROBLEM — Z87.19 H/O CHRONIC HEPATITIS: Status: ACTIVE | Noted: 2024-03-05

## 2024-03-05 RX ORDER — LISINOPRIL 40 MG/1
TABLET ORAL
Qty: 90 TABLET | Refills: 2 | Status: SHIPPED | OUTPATIENT
Start: 2024-03-05

## 2024-03-05 ASSESSMENT — ENCOUNTER SYMPTOMS
CHEST TIGHTNESS: 0
RESPIRATORY NEGATIVE: 1
VOMITING: 0
DIARRHEA: 0
COUGH: 0
ABDOMINAL DISTENTION: 1
COLOR CHANGE: 0
CONSTIPATION: 0
SHORTNESS OF BREATH: 0
NAUSEA: 0
ALLERGIC/IMMUNOLOGIC NEGATIVE: 1
EYES NEGATIVE: 1

## 2024-03-05 NOTE — TELEPHONE ENCOUNTER
Lauri Mitchell is calling to request a refill on the following medication(s):    Medication Request:  Requested Prescriptions     Pending Prescriptions Disp Refills    lisinopril (PRINIVIL;ZESTRIL) 40 MG tablet [Pharmacy Med Name: LISINOPRIL 40 MG TABLET] 90 tablet 1     Sig: TAKE ONE TABLET BY MOUTH EVERY MORNING       Last Visit Date (If Applicable):  3/4/2024    Next Visit Date:    6/10/2024                
never used

## 2024-03-19 DIAGNOSIS — I10 PRIMARY HYPERTENSION: ICD-10-CM

## 2024-03-19 DIAGNOSIS — F33.1 MODERATE EPISODE OF RECURRENT MAJOR DEPRESSIVE DISORDER (HCC): ICD-10-CM

## 2024-03-19 RX ORDER — VENLAFAXINE HYDROCHLORIDE 150 MG/1
150 CAPSULE, EXTENDED RELEASE ORAL DAILY
Qty: 90 CAPSULE | Refills: 1 | Status: SHIPPED | OUTPATIENT
Start: 2024-03-19

## 2024-03-19 RX ORDER — AMLODIPINE BESYLATE 5 MG/1
5 TABLET ORAL DAILY
Qty: 90 TABLET | Refills: 1 | Status: SHIPPED | OUTPATIENT
Start: 2024-03-19

## 2024-03-19 NOTE — TELEPHONE ENCOUNTER
Lauri Mitchell is calling to request a refill on the following medication(s):    Medication Request:  Requested Prescriptions     Pending Prescriptions Disp Refills    amLODIPine (NORVASC) 5 MG tablet [Pharmacy Med Name: amLODIPine BESYLATE 5 MG TAB] 90 tablet 1     Sig: TAKE 1 TABLET BY MOUTH DAILY    venlafaxine (EFFEXOR XR) 150 MG extended release capsule [Pharmacy Med Name: VENLAFAXINE HCL  MG CAP] 90 capsule 1     Sig: TAKE 1 CAPSULE BY MOUTH DAILY       Last Visit Date (If Applicable):  3/4/2024    Next Visit Date:    6/10/2024

## 2024-04-12 RX ORDER — LISINOPRIL 40 MG/1
40 TABLET ORAL EVERY MORNING
Qty: 90 TABLET | Refills: 2 | Status: SHIPPED | OUTPATIENT
Start: 2024-04-12

## 2024-04-12 NOTE — TELEPHONE ENCOUNTER
Lauri Mitchell is calling to request a refill on the following medication(s):    Medication Request:  Requested Prescriptions     Pending Prescriptions Disp Refills    lisinopril (PRINIVIL;ZESTRIL) 40 MG tablet 90 tablet 2     Sig: Take 1 tablet by mouth every morning       Last Visit Date (If Applicable):  3/4/2024    Next Visit Date:    6/10/2024

## 2024-04-26 NOTE — DISCHARGE SUMMARY
[facility]  Office: [address], DO, Anna Marie Sep, DO, Carissajennifer Heart, DO, Nessa Bajwa Blood, DO, Akiko Reyes MD, Anand Bush MD, Toney Howard MD, Merissa Mcmillan MD,  Adilson Mendoza MD, Berta Ontiveros MD, Hiwot Ervin, DO, Christopher Bond MD,  James Walker, DO, Tawnya Lopez MD, Richy Liu MD, Bing Boxer, DO, Damon Portillo MD, Alex Hernandez MD, Jorge Alberto Mae, DO, Andrez Dia MD, John Monsivais MD, Immanuel Pickering MD, Melissa Haasr, MD, Marge Kamara DO, Tee Mathur MD, Samir Gao MD, Blaine Arias, CNP,  Emeka Brennan, CNP, Francia Oro, CNP, Melody Long, CNP,  Bong Freitas, [facility], Anatoliy Ferrell, CNP, Gia Giraldo, CNP, Checo Allan, CNP, Giancarlo Gupta, CNP, Cipriano Jasso, CNP, Hiwot Saucedo PAELIZABETH, Slade Hanson, LAURA, Precious Limon, [facility], [facility],  [facility]    Discharge Summary     Patient ID: Flower Eugene  :  [id number]   MRN: [id number]     ACCOUNT:  [de-identified]   Patient's PCP: PABLITO Min CNP  Admit Date: 2023   Discharge Date: 2023     Length of Stay: 7  Code Status:  Prior  Admitting Physician: Andrez Dia MD  Discharge Physician: Andrez Dia MD     Active Discharge Diagnoses:     Hospital Problem Lists:  Principal Problem:    Closed T10 spinal fracture [facility])  Active Problems:    Age-related osteoporosis with current pathological fracture    Compression fx, lumbar spine, closed, initial encounter [facility])    Spinal stenosis of lumbar region without neurogenic claudication    HTN (hypertension)    Anemia, normocytic normochromic  Resolved Problems:    Hypokalemia    Troponin level elevated      Admission Condition:  poor     Discharged Condition: stable    Hospital Stay:     Hospital Course:  Flower Eugene is a 81 y.o. female who was admitted for the management of   Closed T10 spinal fracture ([address] , presented to ER with Back Pain    Patient was admitted with acute worsening of chronic low back pain. She has a known history of compression fractures in her lumbar spine but was noted to have a new T10 insufficiency fracture. Initially patient was managed conservatively with bracing and tramadol and baclofen but patient continued to have 10 out of 10 pain. She was seen by neurosurgery and orthopedics. She underwent kyphoplasty on 2/22 2023. Patient did have atelectasis bilaterally due to splinting from pain and on ability to take deep breaths. She was requiring oxygen for a few days secondary to hypoxia due to atelectasis and acute respiratory insufficiency. She responded well to physical therapy and incentive spirometry and was discharged after she was off oxygen for 24 hours. She also was noted to have slightly low blood pressures postoperatively. Amlodipine was stopped and lisinopril was decreased and patient was discharged on these lower doses of medications. She will follow-up with her PCP outpatient and have her blood pressure rechecked and medications adjusted. She will follow-up with orthopedics postoperatively. Continue incentive spirometry. Tramadol and baclofen refilled for a few days. Continue PT and exercises at home.       Significant therapeutic interventions: Vertebral augmentation T10,L1, L2    Significant Diagnostic Studies:   Labs / Micro:  CBC:   Lab Results   Component Value Date/Time    WBC 7.5 02/24/2023 03:50 AM    RBC 2.87 02/24/2023 03:50 AM    HGB 9.7 02/24/2023 03:50 AM    HCT 29.1 02/24/2023 03:50 AM    .4 02/24/2023 03:50 AM    MCH 33.8 02/24/2023 03:50 AM    MCHC 33.3 02/24/2023 03:50 AM    RDW 12.8 02/24/2023 03:50 AM     02/24/2023 03:50 AM     CMP:    Lab Results   Component Value Date/Time    GLUCOSE 99 02/24/2023 03:50 AM     02/24/2023 03:50 AM    K 4.2 02/24/2023 03:50 AM     02/24/2023 03:50 AM    CO2 31 02/24/2023 03:50 AM    BUN 18 02/24/2023 03:50 AM CREATININE 0.79 02/24/2023 03:50 AM    ANIONGAP 5 02/24/2023 03:50 AM    ALKPHOS 151 02/18/2023 12:45 PM    ALT 15 02/18/2023 12:45 PM    AST 30 02/18/2023 12:45 PM    BILITOT 0.7 02/18/2023 12:45 PM    LABALBU 3.5 02/18/2023 12:45 PM    ALBUMIN 1.3 02/18/2023 12:45 PM    LABGLOM >60 02/24/2023 03:50 AM    GFRAA >60 07/01/2022 01:13 PM    GFR      07/01/2022 01:13 PM    PROT 6.1 02/18/2023 12:45 PM    PROT 6.9 05/26/2022 12:00 AM    CALCIUM 8.5 02/24/2023 03:50 AM     U/A:    Lab Results   Component Value Date/Time    COLORU Yellow 02/18/2023 06:53 PM    TURBIDITY Clear 02/18/2023 06:53 PM    SPECGRAV 1.008 02/18/2023 06:53 PM    HGBUR NEGATIVE 02/18/2023 06:53 PM    PHUR 7.5 02/18/2023 06:53 PM    PROTEINU NEGATIVE 02/18/2023 06:53 PM    GLUCOSEU NEGATIVE 02/18/2023 06:53 PM    KETUA NEGATIVE 02/18/2023 06:53 PM    BILIRUBINUR NEGATIVE 02/18/2023 06:53 PM    BILIRUBINUR neg 07/30/2018 05:14 PM    UROBILINOGEN Normal 02/18/2023 06:53 PM    NITRU NEGATIVE 02/18/2023 06:53 PM    LEUKOCYTESUR NEGATIVE 02/18/2023 06:53 PM     TSH:    Lab Results   Component Value Date/Time    TSH 0.928 05/26/2022 12:00 AM        Radiology:  XR CHEST PORTABLE    Result Date: 2/23/2023  No acute cardiopulmonary abnormality. XR CHEST PORTABLE    Result Date: 2/22/2023  Trace effusions with scattered infiltrates concerning for pneumonia. Consultations:    Consults:     Final Specialist Recommendations/Findings:   IP CONSULT TO NEUROSURGERY  IP CONSULT TO INTERNAL MEDICINE  IP CONSULT TO ORTHOPEDIC SURGERY      The patient was seen and examined on day of discharge and this discharge summary is in conjunction with any daily progress note from day of discharge. Discharge plan:     Disposition: Home    Physician Follow Up:   PCP and , Orthopedics    No follow-up provider specified.      Requiring Further Evaluation/Follow Up POST HOSPITALIZATION/Incidental Findings: follow up Dexa scan for osteoporosis    Diet: regular Detail Level: Simple diet    Activity: As tolerated    Instructions to Patient: IS, PT at home    Discharge Medications:      Medication List        START taking these medications      calcium carbonate 500 MG chewable tablet  Commonly known as: TUMS  Take 2 tablets by mouth daily     docusate 100 MG Caps  Commonly known as: COLACE, DULCOLAX  Take 100 mg by mouth daily as needed for Constipation     polyethylene glycol 17 g packet  Commonly known as: GLYCOLAX  Take 17 g by mouth daily     vitamin D3 10 MCG (400 UNIT) Tabs tablet  Commonly known as: CHOLECALCIFEROL  Take 1 tablet by mouth daily            CHANGE how you take these medications      lisinopril 20 MG tablet  Commonly known as: PRINIVIL;ZESTRIL  Take 1 tablet by mouth daily  What changed:   medication strength  how much to take     traMADol 50 MG tablet  Commonly known as: ULTRAM  Take 1 tablet by mouth every 6 hours as needed for Pain for up to 3 days.  Max Daily Amount: 200 mg  What changed:   how much to take  how to take this  when to take this  reasons to take this            CONTINUE taking these medications      Baclofen 5 MG tablet  Commonly known as: LIORESAL  Take 1 tablet by mouth 3 times daily as needed (muscle spasms)     mirtazapine 15 MG tablet  Commonly known as: REMERON  Take 0.5 tablets by mouth nightly     Multivitamin Adult Chew     omeprazole 40 MG delayed release capsule  Commonly known as: PRILOSEC  Take 1 capsule by mouth Daily     Step N Rest Walker Misc  1 each by Does not apply route continuous Seated, wheeled walker     venlafaxine 37.5 MG extended release capsule  Commonly known as: EFFEXOR XR  TAKE ONE CAPSULE BY MOUTH TWICE A DAY            STOP taking these medications      amLODIPine 5 MG tablet  Commonly known as: NORVASC     naproxen 500 MG tablet  Commonly known as: NAPROSYN            ASK your doctor about these medications      diclofenac sodium 1 % Gel  Commonly known as: VOLTAREN  Apply 2-4 g topically 4 times daily as needed for Pain Where to Get Your Medications        These medications were sent to [address] [place] [address] Road, [address] [facility] [address]  [address] [facility] [place] [address]      Phone: [phone]   Baclofen 5 MG tablet       These medications were sent to [facility] [address] [facility], [address]  [address] [facility], [facility] [address]      Phone: [phone]   calcium carbonate 500 MG chewable tablet  docusate 100 MG Caps  lisinopril 20 MG tablet  polyethylene glycol 17 g packet  traMADol 50 MG tablet  vitamin D3 10 MCG (400 UNIT) Tabs tablet         No discharge procedures on file. Time Spent on discharge is  35 mins in patient examination, evaluation, counseling as well as medication reconciliation, prescriptions for required medications, discharge plan and follow up. Electronically signed by   Anil Cherry MD  2/26/2023  1:47 PM      Thank you Dr. Alexandre Knight, PABLITO - CNP for the opportunity to be involved in this patient's care.

## 2024-06-10 ENCOUNTER — OFFICE VISIT (OUTPATIENT)
Dept: FAMILY MEDICINE CLINIC | Age: 84
End: 2024-06-10
Payer: MEDICARE

## 2024-06-10 ENCOUNTER — HOSPITAL ENCOUNTER (OUTPATIENT)
Age: 84
Setting detail: SPECIMEN
Discharge: HOME | End: 2024-06-10

## 2024-06-10 VITALS
HEIGHT: 59 IN | WEIGHT: 100.2 LBS | HEART RATE: 92 BPM | BODY MASS INDEX: 20.2 KG/M2 | SYSTOLIC BLOOD PRESSURE: 118 MMHG | DIASTOLIC BLOOD PRESSURE: 72 MMHG | TEMPERATURE: 97.9 F | OXYGEN SATURATION: 96 %

## 2024-06-10 DIAGNOSIS — D64.9 ANEMIA, NORMOCYTIC NORMOCHROMIC: ICD-10-CM

## 2024-06-10 DIAGNOSIS — Z91.81 AT MODERATE RISK FOR FALL: ICD-10-CM

## 2024-06-10 DIAGNOSIS — F32.A ANXIETY AND DEPRESSION: ICD-10-CM

## 2024-06-10 DIAGNOSIS — R26.9 GAIT DISTURBANCE: ICD-10-CM

## 2024-06-10 DIAGNOSIS — K21.9 GASTROESOPHAGEAL REFLUX DISEASE WITHOUT ESOPHAGITIS: ICD-10-CM

## 2024-06-10 DIAGNOSIS — F33.0 MILD EPISODE OF RECURRENT MAJOR DEPRESSIVE DISORDER (HCC): ICD-10-CM

## 2024-06-10 DIAGNOSIS — I10 PRIMARY HYPERTENSION: ICD-10-CM

## 2024-06-10 DIAGNOSIS — F41.9 ANXIETY AND DEPRESSION: ICD-10-CM

## 2024-06-10 DIAGNOSIS — R53.1 GENERALIZED WEAKNESS: ICD-10-CM

## 2024-06-10 DIAGNOSIS — R07.9 CHEST PAIN, UNSPECIFIED TYPE: ICD-10-CM

## 2024-06-10 DIAGNOSIS — Z13.31 POSITIVE DEPRESSION SCREENING: ICD-10-CM

## 2024-06-10 DIAGNOSIS — Z87.19 H/O CHRONIC HEPATITIS: ICD-10-CM

## 2024-06-10 DIAGNOSIS — R73.09 ELEVATED GLUCOSE: ICD-10-CM

## 2024-06-10 DIAGNOSIS — E87.6 HYPOKALEMIA: ICD-10-CM

## 2024-06-10 DIAGNOSIS — F51.01 PRIMARY INSOMNIA: ICD-10-CM

## 2024-06-10 DIAGNOSIS — M81.0 AGE-RELATED OSTEOPOROSIS WITHOUT CURRENT PATHOLOGICAL FRACTURE: ICD-10-CM

## 2024-06-10 DIAGNOSIS — Z86.19 HISTORY OF HEPATITIS C: ICD-10-CM

## 2024-06-10 DIAGNOSIS — Z00.00 WELCOME TO MEDICARE PREVENTIVE VISIT: Primary | ICD-10-CM

## 2024-06-10 PROBLEM — R76.8 HEPATITIS C ANTIBODY POSITIVE IN BLOOD: Status: RESOLVED | Noted: 2022-06-23 | Resolved: 2024-06-10

## 2024-06-10 PROBLEM — R79.89 ELEVATED LFTS: Status: RESOLVED | Noted: 2022-02-17 | Resolved: 2024-06-10

## 2024-06-10 LAB
ALBUMIN SERPL-MCNC: 4.5 G/DL (ref 3.5–5.2)
ALBUMIN/GLOB SERPL: 2 {RATIO} (ref 1–2.5)
ALP SERPL-CCNC: 88 U/L (ref 35–104)
ALT SERPL-CCNC: 15 U/L (ref 10–35)
ANION GAP SERPL CALCULATED.3IONS-SCNC: 12 MMOL/L (ref 9–16)
AST SERPL-CCNC: 28 U/L (ref 10–35)
BASOPHILS # BLD: 0.07 K/UL (ref 0–0.2)
BASOPHILS NFR BLD: 1 % (ref 0–2)
BILIRUB SERPL-MCNC: 0.7 MG/DL (ref 0–1.2)
BUN SERPL-MCNC: 11 MG/DL (ref 8–23)
CALCIUM SERPL-MCNC: 9.7 MG/DL (ref 8.6–10.4)
CHLORIDE SERPL-SCNC: 103 MMOL/L (ref 98–107)
CO2 SERPL-SCNC: 29 MMOL/L (ref 20–31)
CREAT SERPL-MCNC: 0.9 MG/DL (ref 0.5–0.9)
EOSINOPHIL # BLD: 0.42 K/UL (ref 0–0.44)
EOSINOPHILS RELATIVE PERCENT: 8 % (ref 1–4)
ERYTHROCYTE [DISTWIDTH] IN BLOOD BY AUTOMATED COUNT: 12.7 % (ref 11.8–14.4)
EST. AVERAGE GLUCOSE BLD GHB EST-MCNC: 100 MG/DL
FOLATE SERPL-MCNC: >20 NG/ML (ref 4.8–24.2)
GFR, ESTIMATED: 64 ML/MIN/1.73M2
GLUCOSE SERPL-MCNC: 103 MG/DL (ref 74–99)
HBA1C MFR BLD: 5.1 % (ref 4–6)
HCT VFR BLD AUTO: 35.4 % (ref 36.3–47.1)
HGB BLD-MCNC: 12.2 G/DL (ref 11.9–15.1)
IMM GRANULOCYTES # BLD AUTO: <0.03 K/UL (ref 0–0.3)
IMM GRANULOCYTES NFR BLD: 0 %
IRON SATN MFR SERPL: 59 % (ref 20–55)
IRON SERPL-MCNC: 140 UG/DL (ref 37–145)
LYMPHOCYTES NFR BLD: 1.47 K/UL (ref 1.1–3.7)
LYMPHOCYTES RELATIVE PERCENT: 27 % (ref 24–43)
MAGNESIUM SERPL-MCNC: 2.2 MG/DL (ref 1.6–2.4)
MCH RBC QN AUTO: 33.2 PG (ref 25.2–33.5)
MCHC RBC AUTO-ENTMCNC: 34.5 G/DL (ref 28.4–34.8)
MCV RBC AUTO: 96.2 FL (ref 82.6–102.9)
MONOCYTES NFR BLD: 0.42 K/UL (ref 0.1–1.2)
MONOCYTES NFR BLD: 8 % (ref 3–12)
NEUTROPHILS NFR BLD: 56 % (ref 36–65)
NEUTS SEG NFR BLD: 3.02 K/UL (ref 1.5–8.1)
NRBC BLD-RTO: 0 PER 100 WBC
PLATELET # BLD AUTO: 218 K/UL (ref 138–453)
PMV BLD AUTO: 9.5 FL (ref 8.1–13.5)
POTASSIUM SERPL-SCNC: 3.2 MMOL/L (ref 3.7–5.3)
PROT SERPL-MCNC: 7.4 G/DL (ref 6.6–8.7)
RBC # BLD AUTO: 3.68 M/UL (ref 3.95–5.11)
SODIUM SERPL-SCNC: 144 MMOL/L (ref 136–145)
TIBC SERPL-MCNC: 237 UG/DL (ref 250–450)
TSH SERPL DL<=0.05 MIU/L-ACNC: 1.65 UIU/ML (ref 0.27–4.2)
UNSATURATED IRON BINDING CAPACITY: 97 UG/DL (ref 112–347)
VIT B12 SERPL-MCNC: 847 PG/ML (ref 232–1245)
WBC OTHER # BLD: 5.4 K/UL (ref 3.5–11.3)

## 2024-06-10 PROCEDURE — 99214 OFFICE O/P EST MOD 30 MIN: CPT | Performed by: NURSE PRACTITIONER

## 2024-06-10 PROCEDURE — 1123F ACP DISCUSS/DSCN MKR DOCD: CPT | Performed by: NURSE PRACTITIONER

## 2024-06-10 PROCEDURE — 93000 ELECTROCARDIOGRAM COMPLETE: CPT | Performed by: NURSE PRACTITIONER

## 2024-06-10 PROCEDURE — G0402 INITIAL PREVENTIVE EXAM: HCPCS | Performed by: NURSE PRACTITIONER

## 2024-06-10 PROCEDURE — 3074F SYST BP LT 130 MM HG: CPT | Performed by: NURSE PRACTITIONER

## 2024-06-10 PROCEDURE — 3078F DIAST BP <80 MM HG: CPT | Performed by: NURSE PRACTITIONER

## 2024-06-10 RX ORDER — POTASSIUM CHLORIDE 20 MEQ/1
20 TABLET, EXTENDED RELEASE ORAL DAILY
Qty: 7 TABLET | Refills: 0 | Status: SHIPPED | OUTPATIENT
Start: 2024-06-10

## 2024-06-10 RX ORDER — TRAZODONE HYDROCHLORIDE 50 MG/1
25-50 TABLET ORAL NIGHTLY
Qty: 30 TABLET | Refills: 1 | Status: SHIPPED | OUTPATIENT
Start: 2024-06-10

## 2024-06-10 SDOH — ECONOMIC STABILITY: INCOME INSECURITY: HOW HARD IS IT FOR YOU TO PAY FOR THE VERY BASICS LIKE FOOD, HOUSING, MEDICAL CARE, AND HEATING?: NOT HARD AT ALL

## 2024-06-10 SDOH — ECONOMIC STABILITY: FOOD INSECURITY: WITHIN THE PAST 12 MONTHS, YOU WORRIED THAT YOUR FOOD WOULD RUN OUT BEFORE YOU GOT MONEY TO BUY MORE.: NEVER TRUE

## 2024-06-10 SDOH — ECONOMIC STABILITY: FOOD INSECURITY: WITHIN THE PAST 12 MONTHS, THE FOOD YOU BOUGHT JUST DIDN'T LAST AND YOU DIDN'T HAVE MONEY TO GET MORE.: NEVER TRUE

## 2024-06-10 ASSESSMENT — PATIENT HEALTH QUESTIONNAIRE - PHQ9
3. TROUBLE FALLING OR STAYING ASLEEP: NEARLY EVERY DAY
6. FEELING BAD ABOUT YOURSELF - OR THAT YOU ARE A FAILURE OR HAVE LET YOURSELF OR YOUR FAMILY DOWN: NOT AT ALL
1. LITTLE INTEREST OR PLEASURE IN DOING THINGS: SEVERAL DAYS
4. FEELING TIRED OR HAVING LITTLE ENERGY: NEARLY EVERY DAY
SUM OF ALL RESPONSES TO PHQ QUESTIONS 1-9: 11
2. FEELING DOWN, DEPRESSED OR HOPELESS: SEVERAL DAYS
SUM OF ALL RESPONSES TO PHQ9 QUESTIONS 1 & 2: 2
10. IF YOU CHECKED OFF ANY PROBLEMS, HOW DIFFICULT HAVE THESE PROBLEMS MADE IT FOR YOU TO DO YOUR WORK, TAKE CARE OF THINGS AT HOME, OR GET ALONG WITH OTHER PEOPLE: SOMEWHAT DIFFICULT
9. THOUGHTS THAT YOU WOULD BE BETTER OFF DEAD, OR OF HURTING YOURSELF: NOT AT ALL
7. TROUBLE CONCENTRATING ON THINGS, SUCH AS READING THE NEWSPAPER OR WATCHING TELEVISION: NOT AT ALL
5. POOR APPETITE OR OVEREATING: NEARLY EVERY DAY
SUM OF ALL RESPONSES TO PHQ QUESTIONS 1-9: 11
8. MOVING OR SPEAKING SO SLOWLY THAT OTHER PEOPLE COULD HAVE NOTICED. OR THE OPPOSITE, BEING SO FIGETY OR RESTLESS THAT YOU HAVE BEEN MOVING AROUND A LOT MORE THAN USUAL: NOT AT ALL

## 2024-06-10 ASSESSMENT — ENCOUNTER SYMPTOMS
NAUSEA: 0
COLOR CHANGE: 0
EYES NEGATIVE: 1
SHORTNESS OF BREATH: 0
ALLERGIC/IMMUNOLOGIC NEGATIVE: 1
GASTROINTESTINAL NEGATIVE: 1
VOMITING: 0
RESPIRATORY NEGATIVE: 1
COUGH: 0
DIARRHEA: 0
CHEST TIGHTNESS: 0
WHEEZING: 0

## 2024-06-10 ASSESSMENT — LIFESTYLE VARIABLES
HAS A RELATIVE, FRIEND, DOCTOR, OR ANOTHER HEALTH PROFESSIONAL EXPRESSED CONCERN ABOUT YOUR DRINKING OR SUGGESTED YOU CUT DOWN: NO
HOW OFTEN DURING THE LAST YEAR HAVE YOU BEEN UNABLE TO REMEMBER WHAT HAPPENED THE NIGHT BEFORE BECAUSE YOU HAD BEEN DRINKING: NEVER
HOW OFTEN DURING THE LAST YEAR HAVE YOU HAD A FEELING OF GUILT OR REMORSE AFTER DRINKING: NEVER
HOW OFTEN DURING THE LAST YEAR HAVE YOU FAILED TO DO WHAT WAS NORMALLY EXPECTED FROM YOU BECAUSE OF DRINKING: NEVER
HOW OFTEN DO YOU HAVE A DRINK CONTAINING ALCOHOL: 4 OR MORE TIMES A WEEK
HOW OFTEN DURING THE LAST YEAR HAVE YOU NEEDED AN ALCOHOLIC DRINK FIRST THING IN THE MORNING TO GET YOURSELF GOING AFTER A NIGHT OF HEAVY DRINKING: NEVER
HOW OFTEN DURING THE LAST YEAR HAVE YOU FOUND THAT YOU WERE NOT ABLE TO STOP DRINKING ONCE YOU HAD STARTED: NEVER
HAVE YOU OR SOMEONE ELSE BEEN INJURED AS A RESULT OF YOUR DRINKING: NO
HOW MANY STANDARD DRINKS CONTAINING ALCOHOL DO YOU HAVE ON A TYPICAL DAY: 1 OR 2

## 2024-06-10 NOTE — PROGRESS NOTES
Medicare Annual Wellness Visit    Lauri Mitchell is here for Medicare AWV and Discuss Labs    Assessment & Plan   Welcome to Medicare preventive visit  Questionnaire reviewed & discussed  Preventative care reviewed  Immunizations discussed & encouraged   Chest pain, unspecified type  -     EKG 12 lead  -     XR RIBS LEFT INCLUDE CHEST (MIN 3 VIEWS); Future  -     XR STERNUM (MIN 2 VIEWS); Future  Hypokalemia  -     potassium chloride (KLOR-CON M) 20 MEQ extended release tablet; Take 1 tablet by mouth daily, Disp-7 tablet, R-0Normal  -     Potassium; Future  Primary hypertension  History of hepatitis C  Age-related osteoporosis without current pathological fracture  Anxiety and depression  -     traZODone (DESYREL) 50 MG tablet; Take 0.5-1 tablets by mouth nightly, Disp-30 tablet, R-1Normal  Positive depression screening  -     traZODone (DESYREL) 50 MG tablet; Take 0.5-1 tablets by mouth nightly, Disp-30 tablet, R-1Normal  Primary insomnia  -     traZODone (DESYREL) 50 MG tablet; Take 0.5-1 tablets by mouth nightly, Disp-30 tablet, R-1Normal  At moderate risk for fall  -     External Referral To Physical Therapy  Generalized weakness  -     External Referral To Physical Therapy  Gait disturbance  -     External Referral To Physical Therapy    Recommendations for Preventive Services Due: see orders and patient instructions/AVS.  Recommended screening schedule for the next 5-10 years is provided to the patient in written form: see Patient Instructions/AVS.     Return in about 6 months (around 12/10/2024), or if symptoms worsen or fail to improve, for HTN, Med check.     Subjective   The following acute and/or chronic problems were also addressed today:  Depression, chest pain, HTN, hypokalemia, labs, etc.     Patient's complete Health Risk Assessment and screening values have been reviewed and are found in Flowsheets. The following problems were reviewed today and where indicated follow up appointments were made and/or 
  Musculoskeletal:  Positive for arthralgias.   Skin: Negative.  Negative for color change, pallor, rash and wound.   Allergic/Immunologic: Negative.    Neurological:  Positive for tremors. Negative for seizures, syncope and facial asymmetry.   Psychiatric/Behavioral:  Positive for dysphoric mood and sleep disturbance. Negative for self-injury and suicidal ideas. The patient is nervous/anxious.        Objective:     Vitals:    06/10/24 1539   BP: 118/72   Pulse: 92   Temp: 97.9 °F (36.6 °C)   SpO2: 96%       Body mass index is 20.24 kg/m².      Physical Exam  Constitutional:       General: She is not in acute distress.     Appearance: Normal appearance. She is well-developed and normal weight. She is not ill-appearing, toxic-appearing or diaphoretic.   HENT:      Head: Normocephalic and atraumatic.      Right Ear: External ear normal.      Left Ear: External ear normal.      Nose: Nose normal.   Eyes:      General: No scleral icterus.        Right eye: No discharge.         Left eye: No discharge.      Conjunctiva/sclera: Conjunctivae normal.      Pupils: Pupils are equal, round, and reactive to light.   Neck:      Trachea: No tracheal deviation.   Cardiovascular:      Rate and Rhythm: Normal rate and regular rhythm.      Heart sounds: Normal heart sounds. No murmur heard.     No friction rub. No gallop.   Pulmonary:      Effort: Pulmonary effort is normal. No tachypnea, accessory muscle usage or respiratory distress.      Breath sounds: Normal breath sounds. No stridor. No decreased breath sounds, wheezing, rhonchi or rales.   Chest:      Chest wall: Tenderness (to sternum / ribs, pain reproducible) present.   Abdominal:      Palpations: Abdomen is soft.   Musculoskeletal:         General: No tenderness or deformity. Normal range of motion.      Cervical back: Normal range of motion and neck supple.   Skin:     General: Skin is warm and dry.      Coloration: Skin is not pale.      Findings: No erythema or rash.

## 2024-06-14 DIAGNOSIS — E87.6 HYPOKALEMIA: ICD-10-CM

## 2024-06-14 RX ORDER — POTASSIUM CHLORIDE 1500 MG/1
20 TABLET, EXTENDED RELEASE ORAL DAILY
Qty: 7 TABLET | Refills: 0 | OUTPATIENT
Start: 2024-06-14

## 2024-06-24 DIAGNOSIS — K21.9 GASTROESOPHAGEAL REFLUX DISEASE WITHOUT ESOPHAGITIS: ICD-10-CM

## 2024-06-24 RX ORDER — OMEPRAZOLE 40 MG/1
40 CAPSULE, DELAYED RELEASE ORAL DAILY
Qty: 90 CAPSULE | Refills: 3 | Status: SHIPPED | OUTPATIENT
Start: 2024-06-24

## 2024-06-24 NOTE — TELEPHONE ENCOUNTER
Lauri Mitchell is calling to request a refill on the following medication(s):    Medication Request:  Requested Prescriptions     Pending Prescriptions Disp Refills    omeprazole (PRILOSEC) 40 MG delayed release capsule [Pharmacy Med Name: OMEPRAZOLE DR 40 MG CAPSULE] 90 capsule 3     Sig: TAKE 1 CAPSULE BY MOUTH DAILY       Last Visit Date (If Applicable):  6/10/2024    Next Visit Date:    12/9/2024

## 2024-07-01 ENCOUNTER — HOSPITAL ENCOUNTER (OUTPATIENT)
Age: 84
Setting detail: SPECIMEN
Discharge: HOME | End: 2024-07-01

## 2024-07-01 DIAGNOSIS — E87.6 HYPOKALEMIA: ICD-10-CM

## 2024-07-01 LAB — POTASSIUM SERPL-SCNC: 3.5 MMOL/L (ref 3.7–5.3)

## 2024-07-02 DIAGNOSIS — E87.6 HYPOKALEMIA: ICD-10-CM

## 2024-07-02 DIAGNOSIS — E87.6 HYPOKALEMIA: Primary | ICD-10-CM

## 2024-07-02 RX ORDER — POTASSIUM CHLORIDE 20 MEQ/1
20 TABLET, EXTENDED RELEASE ORAL DAILY
Qty: 90 TABLET | Refills: 1 | Status: SHIPPED | OUTPATIENT
Start: 2024-07-02

## 2024-08-06 DIAGNOSIS — F32.A ANXIETY AND DEPRESSION: ICD-10-CM

## 2024-08-06 DIAGNOSIS — F51.01 PRIMARY INSOMNIA: ICD-10-CM

## 2024-08-06 DIAGNOSIS — Z13.31 POSITIVE DEPRESSION SCREENING: ICD-10-CM

## 2024-08-06 DIAGNOSIS — F41.9 ANXIETY AND DEPRESSION: ICD-10-CM

## 2024-08-06 RX ORDER — TRAZODONE HYDROCHLORIDE 50 MG/1
TABLET ORAL
Qty: 30 TABLET | Refills: 3 | Status: SHIPPED | OUTPATIENT
Start: 2024-08-06

## 2024-08-06 NOTE — TELEPHONE ENCOUNTER
Lauri Mitchell is calling to request a refill on the following medication(s):    Medication Request:  Requested Prescriptions     Pending Prescriptions Disp Refills    traZODone (DESYREL) 50 MG tablet [Pharmacy Med Name: TRAZODONE 50 MG TABLET] 30 tablet 1     Sig: TAKE HALF A TABLET TO ONE TABLET BY MOUTH NIGHTLY       Last Visit Date (If Applicable):  6/10/2024    Next Visit Date:    12/9/2024

## 2024-08-23 ENCOUNTER — TELEPHONE (OUTPATIENT)
Dept: FAMILY MEDICINE CLINIC | Age: 84
End: 2024-08-23

## 2024-08-23 NOTE — TELEPHONE ENCOUNTER
Jolene calling in from home health just wanting to know if Ondina Michael will be following this patient. Meaning if there are any issues or orders they need, they could reach out to the office.

## 2024-09-09 ENCOUNTER — HOSPITAL ENCOUNTER (OUTPATIENT)
Age: 84
Setting detail: SPECIMEN
Discharge: HOME | End: 2024-09-09

## 2024-09-09 ENCOUNTER — OFFICE VISIT (OUTPATIENT)
Dept: FAMILY MEDICINE CLINIC | Age: 84
End: 2024-09-09
Payer: MEDICARE

## 2024-09-09 VITALS
HEART RATE: 78 BPM | DIASTOLIC BLOOD PRESSURE: 72 MMHG | TEMPERATURE: 97.1 F | BODY MASS INDEX: 20 KG/M2 | SYSTOLIC BLOOD PRESSURE: 112 MMHG | WEIGHT: 99 LBS | OXYGEN SATURATION: 95 %

## 2024-09-09 DIAGNOSIS — F33.41 RECURRENT MAJOR DEPRESSIVE DISORDER, IN PARTIAL REMISSION (HCC): ICD-10-CM

## 2024-09-09 DIAGNOSIS — K55.9 ISCHEMIC COLITIS (HCC): ICD-10-CM

## 2024-09-09 DIAGNOSIS — K55.9 ISCHEMIC COLITIS (HCC): Primary | ICD-10-CM

## 2024-09-09 DIAGNOSIS — R65.21 SEPTIC SHOCK (HCC): ICD-10-CM

## 2024-09-09 DIAGNOSIS — A41.9 SEPTIC SHOCK (HCC): ICD-10-CM

## 2024-09-09 DIAGNOSIS — D64.9 ANEMIA, UNSPECIFIED TYPE: ICD-10-CM

## 2024-09-09 DIAGNOSIS — Z09 HOSPITAL DISCHARGE FOLLOW-UP: ICD-10-CM

## 2024-09-09 DIAGNOSIS — I10 PRIMARY HYPERTENSION: ICD-10-CM

## 2024-09-09 LAB
ALBUMIN SERPL-MCNC: 4.3 G/DL (ref 3.5–5.2)
ALBUMIN/GLOB SERPL: 1 {RATIO} (ref 1–2.5)
ALP SERPL-CCNC: 73 U/L (ref 35–104)
ALT SERPL-CCNC: 15 U/L (ref 10–35)
ANION GAP SERPL CALCULATED.3IONS-SCNC: 9 MMOL/L (ref 9–16)
AST SERPL-CCNC: 27 U/L (ref 10–35)
BASOPHILS # BLD: 0.07 K/UL (ref 0–0.2)
BASOPHILS NFR BLD: 1 % (ref 0–2)
BILIRUB SERPL-MCNC: 0.4 MG/DL (ref 0–1.2)
BUN SERPL-MCNC: 13 MG/DL (ref 8–23)
CALCIUM SERPL-MCNC: 9.4 MG/DL (ref 8.6–10.4)
CHLORIDE SERPL-SCNC: 106 MMOL/L (ref 98–107)
CO2 SERPL-SCNC: 26 MMOL/L (ref 20–31)
CREAT SERPL-MCNC: 0.8 MG/DL (ref 0.5–0.9)
EOSINOPHIL # BLD: 0.21 K/UL (ref 0–0.44)
EOSINOPHILS RELATIVE PERCENT: 4 % (ref 1–4)
ERYTHROCYTE [DISTWIDTH] IN BLOOD BY AUTOMATED COUNT: 13.7 % (ref 11.8–14.4)
FOLATE SERPL-MCNC: >20 NG/ML (ref 4.8–24.2)
GFR, ESTIMATED: 74 ML/MIN/1.73M2
GLUCOSE SERPL-MCNC: 99 MG/DL (ref 74–99)
HCT VFR BLD AUTO: 35.6 % (ref 36.3–47.1)
HGB BLD-MCNC: 11.3 G/DL (ref 11.9–15.1)
IMM GRANULOCYTES # BLD AUTO: <0.03 K/UL (ref 0–0.3)
IMM GRANULOCYTES NFR BLD: 0 %
IRON SATN MFR SERPL: 37 % (ref 20–55)
IRON SERPL-MCNC: 101 UG/DL (ref 37–145)
LYMPHOCYTES NFR BLD: 1.43 K/UL (ref 1.1–3.7)
LYMPHOCYTES RELATIVE PERCENT: 28 % (ref 24–43)
MAGNESIUM SERPL-MCNC: 2.1 MG/DL (ref 1.6–2.4)
MCH RBC QN AUTO: 33.5 PG (ref 25.2–33.5)
MCHC RBC AUTO-ENTMCNC: 31.7 G/DL (ref 28.4–34.8)
MCV RBC AUTO: 105.6 FL (ref 82.6–102.9)
MONOCYTES NFR BLD: 0.45 K/UL (ref 0.1–1.2)
MONOCYTES NFR BLD: 9 % (ref 3–12)
NEUTROPHILS NFR BLD: 58 % (ref 36–65)
NEUTS SEG NFR BLD: 2.85 K/UL (ref 1.5–8.1)
NRBC BLD-RTO: 0 PER 100 WBC
PLATELET # BLD AUTO: 252 K/UL (ref 138–453)
PMV BLD AUTO: 9.2 FL (ref 8.1–13.5)
POTASSIUM SERPL-SCNC: 4.4 MMOL/L (ref 3.7–5.3)
PROT SERPL-MCNC: 7.2 G/DL (ref 6.6–8.7)
RBC # BLD AUTO: 3.37 M/UL (ref 3.95–5.11)
RBC # BLD: ABNORMAL 10*6/UL
SODIUM SERPL-SCNC: 141 MMOL/L (ref 136–145)
TIBC SERPL-MCNC: 270 UG/DL (ref 250–450)
UNSATURATED IRON BINDING CAPACITY: 169 UG/DL (ref 112–347)
VIT B12 SERPL-MCNC: 907 PG/ML (ref 232–1245)
WBC OTHER # BLD: 5 K/UL (ref 3.5–11.3)

## 2024-09-09 PROCEDURE — 1123F ACP DISCUSS/DSCN MKR DOCD: CPT | Performed by: NURSE PRACTITIONER

## 2024-09-09 PROCEDURE — 3078F DIAST BP <80 MM HG: CPT | Performed by: NURSE PRACTITIONER

## 2024-09-09 PROCEDURE — 3074F SYST BP LT 130 MM HG: CPT | Performed by: NURSE PRACTITIONER

## 2024-09-09 PROCEDURE — 99215 OFFICE O/P EST HI 40 MIN: CPT | Performed by: NURSE PRACTITIONER

## 2024-09-09 PROCEDURE — 1111F DSCHRG MED/CURRENT MED MERGE: CPT | Performed by: NURSE PRACTITIONER

## 2024-09-09 RX ORDER — AMLODIPINE BESYLATE 5 MG/1
5 TABLET ORAL DAILY
Qty: 90 TABLET | Refills: 2 | Status: SHIPPED | OUTPATIENT
Start: 2024-09-09

## 2024-09-09 RX ORDER — VENLAFAXINE HYDROCHLORIDE 150 MG/1
150 CAPSULE, EXTENDED RELEASE ORAL DAILY
Qty: 90 CAPSULE | Refills: 2 | Status: SHIPPED | OUTPATIENT
Start: 2024-09-09

## 2024-09-09 RX ORDER — LISINOPRIL 40 MG/1
40 TABLET ORAL EVERY MORNING
Qty: 90 TABLET | Refills: 2 | Status: SHIPPED | OUTPATIENT
Start: 2024-09-09

## 2024-09-09 RX ORDER — DOCUSATE SODIUM 100 MG/1
100 CAPSULE, LIQUID FILLED ORAL DAILY PRN
COMMUNITY
Start: 2024-09-09

## 2024-09-09 ASSESSMENT — ENCOUNTER SYMPTOMS
COLOR CHANGE: 0
DIARRHEA: 0
BLOOD IN STOOL: 0
EYES NEGATIVE: 1
CHEST TIGHTNESS: 0
COUGH: 0
NAUSEA: 0
VOMITING: 0
ALLERGIC/IMMUNOLOGIC NEGATIVE: 1
RESPIRATORY NEGATIVE: 1
ABDOMINAL PAIN: 1
SHORTNESS OF BREATH: 0

## 2024-09-09 ASSESSMENT — PATIENT HEALTH QUESTIONNAIRE - PHQ9
10. IF YOU CHECKED OFF ANY PROBLEMS, HOW DIFFICULT HAVE THESE PROBLEMS MADE IT FOR YOU TO DO YOUR WORK, TAKE CARE OF THINGS AT HOME, OR GET ALONG WITH OTHER PEOPLE: SOMEWHAT DIFFICULT
7. TROUBLE CONCENTRATING ON THINGS, SUCH AS READING THE NEWSPAPER OR WATCHING TELEVISION: NOT AT ALL
SUM OF ALL RESPONSES TO PHQ QUESTIONS 1-9: 8
8. MOVING OR SPEAKING SO SLOWLY THAT OTHER PEOPLE COULD HAVE NOTICED. OR THE OPPOSITE, BEING SO FIGETY OR RESTLESS THAT YOU HAVE BEEN MOVING AROUND A LOT MORE THAN USUAL: NOT AT ALL
3. TROUBLE FALLING OR STAYING ASLEEP: NEARLY EVERY DAY
SUM OF ALL RESPONSES TO PHQ9 QUESTIONS 1 & 2: 2
9. THOUGHTS THAT YOU WOULD BE BETTER OFF DEAD, OR OF HURTING YOURSELF: NOT AT ALL
SUM OF ALL RESPONSES TO PHQ QUESTIONS 1-9: 8
SUM OF ALL RESPONSES TO PHQ QUESTIONS 1-9: 8
4. FEELING TIRED OR HAVING LITTLE ENERGY: NEARLY EVERY DAY
SUM OF ALL RESPONSES TO PHQ QUESTIONS 1-9: 8
1. LITTLE INTEREST OR PLEASURE IN DOING THINGS: SEVERAL DAYS
2. FEELING DOWN, DEPRESSED OR HOPELESS: SEVERAL DAYS
5. POOR APPETITE OR OVEREATING: NOT AT ALL
6. FEELING BAD ABOUT YOURSELF - OR THAT YOU ARE A FAILURE OR HAVE LET YOURSELF OR YOUR FAMILY DOWN: NOT AT ALL

## 2024-09-18 DIAGNOSIS — I10 PRIMARY HYPERTENSION: ICD-10-CM

## 2024-09-18 RX ORDER — AMLODIPINE BESYLATE 5 MG/1
5 TABLET ORAL DAILY
Qty: 90 TABLET | Refills: 2 | OUTPATIENT
Start: 2024-09-18

## 2024-12-24 ENCOUNTER — OFFICE VISIT (OUTPATIENT)
Dept: FAMILY MEDICINE CLINIC | Age: 84
End: 2024-12-24
Payer: MEDICARE

## 2024-12-24 VITALS
OXYGEN SATURATION: 100 % | BODY MASS INDEX: 20.72 KG/M2 | HEART RATE: 101 BPM | SYSTOLIC BLOOD PRESSURE: 114 MMHG | RESPIRATION RATE: 14 BRPM | TEMPERATURE: 98.4 F | HEIGHT: 59 IN | WEIGHT: 102.8 LBS | DIASTOLIC BLOOD PRESSURE: 64 MMHG

## 2024-12-24 DIAGNOSIS — G24.01 TARDIVE DYSKINESIA: ICD-10-CM

## 2024-12-24 DIAGNOSIS — H61.23 BILATERAL IMPACTED CERUMEN: ICD-10-CM

## 2024-12-24 DIAGNOSIS — F32.0 CURRENT MILD EPISODE OF MAJOR DEPRESSIVE DISORDER WITHOUT PRIOR EPISODE (HCC): Primary | ICD-10-CM

## 2024-12-24 DIAGNOSIS — H91.93 HEARING IMPAIRED PERSON, BILATERAL: ICD-10-CM

## 2024-12-24 DIAGNOSIS — S22.080S COMPRESSION FRACTURE OF T12 VERTEBRA, SEQUELA: ICD-10-CM

## 2024-12-24 DIAGNOSIS — B18.2 CHRONIC HEPATITIS C WITHOUT HEPATIC COMA (HCC): ICD-10-CM

## 2024-12-24 DIAGNOSIS — K22.89 OTHER SPECIFIED DISEASE OF ESOPHAGUS: ICD-10-CM

## 2024-12-24 DIAGNOSIS — J99: ICD-10-CM

## 2024-12-24 DIAGNOSIS — Z13.31 POSITIVE DEPRESSION SCREENING: ICD-10-CM

## 2024-12-24 DIAGNOSIS — F32.A ANXIETY AND DEPRESSION: ICD-10-CM

## 2024-12-24 DIAGNOSIS — M81.0 AGE-RELATED OSTEOPOROSIS WITHOUT CURRENT PATHOLOGICAL FRACTURE: ICD-10-CM

## 2024-12-24 DIAGNOSIS — E87.6 HYPOKALEMIA: ICD-10-CM

## 2024-12-24 DIAGNOSIS — F41.9 ANXIETY AND DEPRESSION: ICD-10-CM

## 2024-12-24 DIAGNOSIS — I10 PRIMARY HYPERTENSION: ICD-10-CM

## 2024-12-24 DIAGNOSIS — S32.040S COMPRESSION FRACTURE OF L4 VERTEBRA, SEQUELA: ICD-10-CM

## 2024-12-24 DIAGNOSIS — K55.039: ICD-10-CM

## 2024-12-24 DIAGNOSIS — F33.1 MODERATE EPISODE OF RECURRENT MAJOR DEPRESSIVE DISORDER (HCC): ICD-10-CM

## 2024-12-24 DIAGNOSIS — F51.01 PRIMARY INSOMNIA: ICD-10-CM

## 2024-12-24 DIAGNOSIS — D64.9 ANEMIA, NORMOCYTIC NORMOCHROMIC: ICD-10-CM

## 2024-12-24 PROBLEM — K92.1 MELENA: Status: ACTIVE | Noted: 2024-08-09

## 2024-12-24 PROBLEM — R11.10 VOMITING: Status: ACTIVE | Noted: 2024-08-09

## 2024-12-24 PROBLEM — Z87.311 PERSONAL HISTORY OF (HEALED) OTHER PATHOLOGICAL FRACTURE: Status: ACTIVE | Noted: 2024-08-16

## 2024-12-24 PROBLEM — A41.9 SEPTIC SHOCK (HCC): Status: ACTIVE | Noted: 2024-08-10

## 2024-12-24 PROBLEM — R10.9 ABDOMINAL PAIN: Status: ACTIVE | Noted: 2024-08-09

## 2024-12-24 PROBLEM — R65.21 SEPTIC SHOCK (HCC): Status: ACTIVE | Noted: 2024-08-10

## 2024-12-24 PROBLEM — Z86.19 PERSONAL HISTORY OF OTHER INFECTIOUS AND PARASITIC DISEASES: Status: ACTIVE | Noted: 2024-03-05

## 2024-12-24 PROBLEM — K92.1 HEMATOCHEZIA: Status: ACTIVE | Noted: 2024-08-09

## 2024-12-24 PROBLEM — R13.11 DYSPHAGIA, ORAL PHASE: Status: ACTIVE | Noted: 2024-08-20

## 2024-12-24 PROCEDURE — 3078F DIAST BP <80 MM HG: CPT | Performed by: FAMILY MEDICINE

## 2024-12-24 PROCEDURE — 1159F MED LIST DOCD IN RCRD: CPT | Performed by: FAMILY MEDICINE

## 2024-12-24 PROCEDURE — 3074F SYST BP LT 130 MM HG: CPT | Performed by: FAMILY MEDICINE

## 2024-12-24 PROCEDURE — 99214 OFFICE O/P EST MOD 30 MIN: CPT | Performed by: FAMILY MEDICINE

## 2024-12-24 PROCEDURE — 1123F ACP DISCUSS/DSCN MKR DOCD: CPT | Performed by: FAMILY MEDICINE

## 2024-12-24 PROCEDURE — 1160F RVW MEDS BY RX/DR IN RCRD: CPT | Performed by: FAMILY MEDICINE

## 2024-12-24 RX ORDER — POTASSIUM CHLORIDE 1500 MG/1
20 TABLET, EXTENDED RELEASE ORAL DAILY
Qty: 90 TABLET | Refills: 1 | Status: SHIPPED | OUTPATIENT
Start: 2024-12-24

## 2024-12-24 RX ORDER — TRAZODONE HYDROCHLORIDE 50 MG/1
TABLET, FILM COATED ORAL
Qty: 30 TABLET | Refills: 3 | Status: SHIPPED | OUTPATIENT
Start: 2024-12-24

## 2024-12-24 ASSESSMENT — ENCOUNTER SYMPTOMS
BACK PAIN: 1
ALLERGIC/IMMUNOLOGIC NEGATIVE: 1
EYES NEGATIVE: 1
GASTROINTESTINAL NEGATIVE: 1
RESPIRATORY NEGATIVE: 1

## 2024-12-24 NOTE — PROGRESS NOTES
Subjective:      Patient ID: Lauri Mitchell is a 84 y.o. female.    Ear Fullness   There is pain in both ears. This is a new problem. The current episode started 1 to 4 weeks ago. The problem occurs constantly. The problem has been gradually improving. There has been no fever. The pain is at a severity of 0/10. The patient is experiencing no pain. She has tried ear drops for the symptoms. The treatment provided mild relief. Her past medical history is significant for hearing loss.       Review of Systems   Constitutional: Negative.    HENT: Negative.     Eyes: Negative.    Respiratory: Negative.     Cardiovascular: Negative.    Gastrointestinal: Negative.    Endocrine: Negative.    Musculoskeletal:  Positive for arthralgias and back pain.   Skin: Negative.    Allergic/Immunologic: Negative.    Neurological: Negative.    Hematological: Negative.    Psychiatric/Behavioral: Negative.     Past family and social history unremarkable.  .iag      Objective:   Physical Exam  Vitals and nursing note reviewed.   Constitutional:       Appearance: She is well-developed.   HENT:      Head: Normocephalic and atraumatic.      Right Ear: External ear normal.      Left Ear: External ear normal.      Ears:      Comments: Mild to moderate cerumen impaction more on the left than the right.  No inflammatory changes  Patient wears over-the-counter hearing aid  Eyes:      Conjunctiva/sclera: Conjunctivae normal.      Pupils: Pupils are equal, round, and reactive to light.   Cardiovascular:      Rate and Rhythm: Normal rate and regular rhythm.      Heart sounds: Normal heart sounds.      Comments: Hypertension  Pulmonary:      Effort: Pulmonary effort is normal.      Breath sounds: Normal breath sounds.   Abdominal:      General: Bowel sounds are normal.      Palpations: Abdomen is soft.      Comments: GERD   Musculoskeletal:         General: Normal range of motion.      Cervical back: Normal range of motion and neck supple.      Comments:

## 2024-12-24 NOTE — TELEPHONE ENCOUNTER
Lauri Mitchell is calling to request a refill on the following medication(s):    Medication Request:  Requested Prescriptions     Pending Prescriptions Disp Refills    KLOR-CON M20 20 MEQ extended release tablet [Pharmacy Med Name: KLOR-CON M20 TABLET] 90 tablet 1     Sig: TAKE 1 TABLET BY MOUTH DAILY    traZODone (DESYREL) 50 MG tablet [Pharmacy Med Name: traZODone 50 MG TABLET] 30 tablet 3     Sig: TAKE 1/2 TO 1 TABLET BY MOUTH ONCE NIGHTLY       Last Visit Date (If Applicable):  9/9/2024    Next Visit Date:    3/31/2025

## 2025-05-01 ENCOUNTER — PATIENT MESSAGE (OUTPATIENT)
Dept: FAMILY MEDICINE CLINIC | Age: 85
End: 2025-05-01

## 2025-05-05 DIAGNOSIS — Z13.31 POSITIVE DEPRESSION SCREENING: ICD-10-CM

## 2025-05-05 DIAGNOSIS — F32.A ANXIETY AND DEPRESSION: ICD-10-CM

## 2025-05-05 DIAGNOSIS — F41.9 ANXIETY AND DEPRESSION: ICD-10-CM

## 2025-05-05 DIAGNOSIS — F51.01 PRIMARY INSOMNIA: ICD-10-CM

## 2025-05-05 RX ORDER — TRAZODONE HYDROCHLORIDE 50 MG/1
50 TABLET ORAL NIGHTLY
Qty: 90 TABLET | Refills: 1 | Status: SHIPPED | OUTPATIENT
Start: 2025-05-05

## 2025-06-17 DIAGNOSIS — K21.9 GASTROESOPHAGEAL REFLUX DISEASE WITHOUT ESOPHAGITIS: ICD-10-CM

## 2025-06-17 RX ORDER — PANTOPRAZOLE SODIUM 40 MG/1
40 TABLET, DELAYED RELEASE ORAL
Qty: 90 TABLET | Refills: 3 | Status: SHIPPED | OUTPATIENT
Start: 2025-06-17

## 2025-07-21 ENCOUNTER — TELEPHONE (OUTPATIENT)
Dept: FAMILY MEDICINE CLINIC | Age: 85
End: 2025-07-21

## 2025-07-30 DIAGNOSIS — I10 PRIMARY HYPERTENSION: ICD-10-CM

## 2025-07-30 NOTE — TELEPHONE ENCOUNTER
Lauri Mitchell is calling to request a refill on the following medication(s):    Medication Request:  Requested Prescriptions     Pending Prescriptions Disp Refills    amLODIPine (NORVASC) 5 MG tablet [Pharmacy Med Name: amLODIPine BESYLATE 5 MG TAB] 90 tablet 2     Sig: TAKE 1 TABLET BY MOUTH DAILY       Last Visit Date (If Applicable):  12/24/2024    Next Visit Date:    Visit date not found

## 2025-07-31 RX ORDER — AMLODIPINE BESYLATE 5 MG/1
5 TABLET ORAL DAILY
Qty: 90 TABLET | Refills: 0 | Status: SHIPPED | OUTPATIENT
Start: 2025-07-31

## 2025-08-06 DIAGNOSIS — F33.41 RECURRENT MAJOR DEPRESSIVE DISORDER, IN PARTIAL REMISSION: ICD-10-CM

## 2025-08-07 RX ORDER — VENLAFAXINE HYDROCHLORIDE 150 MG/1
150 CAPSULE, EXTENDED RELEASE ORAL DAILY
Qty: 90 CAPSULE | Refills: 0 | Status: SHIPPED | OUTPATIENT
Start: 2025-08-07

## (undated) DEVICE — SYRINGE A08E KIS INFLATION HP: Brand: KYPHON®  INFLATION SYRINGE

## (undated) DEVICE — KIT KEX152EB-CDS-A 15/2 FF W CDS: Brand: EXPRESS™ OSTEO INTRODUCER® SYSTEM; CDS AND BFD

## (undated) DEVICE — SHEET, T, LAPAROTOMY, STERILE: Brand: MEDLINE

## (undated) DEVICE — GLOVE SURG SZ 8 THK118MIL BLK LTX FREE POLYISOPRENE BEAD CUF

## (undated) DEVICE — MIXER A07A CEMENT

## (undated) DEVICE — IBT KIT KPX153PB-A FF X2 15/3 1 STP: Brand: KYPHON® ONE-STEP™ SYSTEM, TROCAR & DIAMOND AND BFD

## (undated) DEVICE — GLOVE SURG SZ 75 THK118MIL BLK LTX FREE POLYISOPRENE BEAD

## (undated) DEVICE — DRAPE,REIN 53X77,STERILE: Brand: MEDLINE

## (undated) DEVICE — SYRINGE A08E-ATR INFLATION HP: Brand: SYRINGE ATR INFLATION HP

## (undated) DEVICE — GARMENT,MEDLINE,DVT,INT,CALF,MED, GEN2: Brand: MEDLINE

## (undated) DEVICE — BONE TAMP KIT KPX153NB AF X2 15/3

## (undated) DEVICE — GOWN,AURORA,NONREINFORCED,LARGE: Brand: MEDLINE

## (undated) DEVICE — GLOVE SURG SZ 8 L1122IN FNGR THK14MIL CUF THK12MIL STD LATEX KHAKI

## (undated) DEVICE — THE STERILE LIGHT HANDLE COVER IS USED WITH STERIS SURGICAL LIGHTING AND VISUALIZATION SYSTEMS.

## (undated) DEVICE — INSERT CUSH STD PRONEVIEW

## (undated) DEVICE — DRAPE,UTILITY,XL,4/PK,STERILE: Brand: MEDLINE

## (undated) DEVICE — 3M™ STERI-STRIP™ COMPOUND BENZOIN TINCTURE 40 BAGS/CARTON 4 CARTONS/CASE C1544: Brand: 3M™ STERI-STRIP™

## (undated) DEVICE — INTENDED FOR TISSUE SEPARATION, AND OTHER PROCEDURES THAT REQUIRE A SHARP SURGICAL BLADE TO PUNCTURE OR CUT.: Brand: BARD-PARKER ® CARBON RIB-BACK BLADES

## (undated) DEVICE — INTRODUCER T15S KYPHON DIRECT SIZE 3: Brand: KYPHON ASSIST™

## (undated) DEVICE — SVMMC KYPHOPLASTY PK

## (undated) DEVICE — STRIP,CLOSURE,WOUND,MEDI-STRIP,1/2X4: Brand: MEDLINE

## (undated) DEVICE — GLOVE SURG SZ 65 THK91MIL LTX FREE SYN POLYISOPRENE

## (undated) DEVICE — CONTAINER,SPECIMEN,4OZ,OR STRL: Brand: MEDLINE